# Patient Record
Sex: FEMALE | Race: WHITE | NOT HISPANIC OR LATINO | Employment: FULL TIME | URBAN - METROPOLITAN AREA
[De-identification: names, ages, dates, MRNs, and addresses within clinical notes are randomized per-mention and may not be internally consistent; named-entity substitution may affect disease eponyms.]

---

## 2017-01-16 ENCOUNTER — ALLSCRIPTS OFFICE VISIT (OUTPATIENT)
Dept: OTHER | Facility: OTHER | Age: 37
End: 2017-01-16

## 2017-02-07 ENCOUNTER — HOSPITAL ENCOUNTER (OUTPATIENT)
Dept: SLEEP CENTER | Facility: HOSPITAL | Age: 37
Discharge: HOME | End: 2017-02-07
Attending: INTERNAL MEDICINE
Payer: COMMERCIAL

## 2017-02-07 ENCOUNTER — TRANSCRIBE ORDERS (OUTPATIENT)
Dept: SLEEP CENTER | Facility: HOSPITAL | Age: 37
End: 2017-02-07

## 2017-02-07 DIAGNOSIS — G47.33 OBSTRUCTIVE SLEEP APNEA (ADULT) (PEDIATRIC): ICD-10-CM

## 2017-02-07 DIAGNOSIS — F51.01 PRIMARY INSOMNIA: Primary | ICD-10-CM

## 2017-03-11 ENCOUNTER — ALLSCRIPTS OFFICE VISIT (OUTPATIENT)
Dept: OTHER | Facility: OTHER | Age: 37
End: 2017-03-11

## 2017-04-20 ENCOUNTER — ALLSCRIPTS OFFICE VISIT (OUTPATIENT)
Dept: OTHER | Facility: OTHER | Age: 37
End: 2017-04-20

## 2017-05-09 ENCOUNTER — TRANSCRIBE ORDERS (OUTPATIENT)
Dept: SLEEP CENTER | Facility: HOSPITAL | Age: 37
End: 2017-05-09

## 2017-05-09 ENCOUNTER — HOSPITAL ENCOUNTER (OUTPATIENT)
Dept: SLEEP CENTER | Facility: HOSPITAL | Age: 37
Discharge: HOME | End: 2017-05-09
Attending: INTERNAL MEDICINE
Payer: COMMERCIAL

## 2017-05-09 DIAGNOSIS — F51.01 PRIMARY INSOMNIA: ICD-10-CM

## 2017-05-09 DIAGNOSIS — G47.33 OSA (OBSTRUCTIVE SLEEP APNEA): Primary | ICD-10-CM

## 2017-07-21 ENCOUNTER — ALLSCRIPTS OFFICE VISIT (OUTPATIENT)
Dept: OTHER | Facility: OTHER | Age: 37
End: 2017-07-21

## 2017-12-07 ENCOUNTER — GENERIC CONVERSION - ENCOUNTER (OUTPATIENT)
Dept: OTHER | Facility: OTHER | Age: 37
End: 2017-12-07

## 2017-12-27 ENCOUNTER — ALLSCRIPTS OFFICE VISIT (OUTPATIENT)
Dept: OTHER | Facility: OTHER | Age: 37
End: 2017-12-27

## 2017-12-27 PROCEDURE — G0145 SCR C/V CYTO,THINLAYER,RESCR: HCPCS | Performed by: OBSTETRICS & GYNECOLOGY

## 2017-12-27 PROCEDURE — 87624 HPV HI-RISK TYP POOLED RSLT: CPT | Performed by: OBSTETRICS & GYNECOLOGY

## 2017-12-28 ENCOUNTER — LAB REQUISITION (OUTPATIENT)
Dept: LAB | Facility: HOSPITAL | Age: 37
End: 2017-12-28
Payer: COMMERCIAL

## 2017-12-28 DIAGNOSIS — Z12.4 ENCOUNTER FOR SCREENING FOR MALIGNANT NEOPLASM OF CERVIX: ICD-10-CM

## 2017-12-28 DIAGNOSIS — Z11.51 ENCOUNTER FOR SCREENING FOR HUMAN PAPILLOMAVIRUS (HPV): ICD-10-CM

## 2017-12-28 NOTE — PROGRESS NOTES
Assessment   1  Contraceptive use (V25 40) (Z30 40)   2  Uterine enlargement (621 2) (N85 2)   3  Encounter for routine gynecological examination with Papanicolaou smear of cervix     (V72 31,V76 2) (Z01 419)   4  Visit for screening mammogram (V76 12) (Z12 31)    Plan   Contraceptive use    · Dianna 0 35 MG Oral Tablet; One po daily   Rx By: Dov Mayer; Dispense: 28 Days ; #:1 X 28 Tablet Disp Pack; Refill: 12; For: Contraceptive use; CECELIA = N; Verified Transmission to [facility][id number] Last Updated By: System, [employer]; 12/27/2017 10:08:44 AM  Encounter for routine gynecological examination with Papanicolaou smear of cervix    · Follow-up visit in 1 year Evaluation and Treatment  Follow-up  Status: Hold For -    Scheduling  Requested for: 94Dyj7190   Ordered; For: Encounter for routine gynecological examination with Papanicolaou smear of cervix; Ordered By: Dov Mayer Performed:  Due: 37LPX6007  Uterine enlargement    · Follow-up visit in 3 weeks Evaluation and Treatment  Follow-up  Status: Hold For -    Scheduling  Requested for: 85Bee6257   Ordered; For: Uterine enlargement; Ordered By: Dov Mayer Performed:  Due: 30FIH2756  Visit for screening mammogram    · * MAMMO SCREENING BILATERAL W CAD; Status:Hold For - Scheduling; Requested    PKU:[id number]; Perform:[facility]iology; GJO:[id number];KJXTDWO; For:Visit for screening mammogram; Ordered By:Shaniqua Segura; Discussion/Summary      1  Yearly exam-Pap smear done with HPV testing with patient consent, self-breast awareness reviewed, calcium/vitamin-D recommendations recommendations given, mammogram request given postdated June 2018 Contraceptionâpatient stop combined OCP secondary to blood pressure issues  Her blood pressure is much improved  She notes normal menses on the minipill, but her period is somewhat irregular, occasionally occurring a few days earlier  Birth control counselingâpatient does not plan to have children   She is interested in Mirena IUD  We will assess for this in the future after her ultrasound as noted below  Uterine enlargement-exam notable for possible myomatous uterus at 8-10 week size with some fullness appreciated toward the right  She will return in the near future to assess for possible uterine myomas, rule out ovarian cyst  will follow-up in the near future for ultrasound and office visit with me  Otherwise, follow-up 1 year for yearly exam     The patient has the current Goals: Reviewed  The patent has the current Barriers: None  Patient is able to Self-Care  PATIENT EDUCATION RECORD She was given the following educational materials: Calcium/vitamin-D sheet   Possible side effects of new medications were reviewed with the patient/guardian today  The treatment plan was reviewed with the patient/guardian  The patient/guardian understands and agrees with the treatment plan      Chief Complaint   1  Visit For: Preventive General Multisystem Exam    History of Present Illness   HPI: Patient was seen today for yearly exam  Please see assessment plan and discussion for details  Review of Systems        Constitutional: No fever, no chills, feels well, no tiredness, no recent weight gain or loss  ENT: no ear ache, no loss of hearing, no nosebleeds or nasal discharge, no sore throat or hoarseness  Cardiovascular: no complaints of slow or fast heart rate, no chest pain, no palpitations, no leg claudication or lower extremity edema  Respiratory: no complaints of shortness of breath, no wheezing, no dyspnea on exertion, no orthopnea or PND  Breasts: no complaints of breast pain, breast lump or nipple discharge  Gastrointestinal: no complaints of abdominal pain, no constipation, no nausea or diarrhea, no vomiting, no bloody stools  Genitourinary: no complaints of dysuria, no incontinence, no pelvic pain, no dysmenorrhea, no vaginal discharge or abnormal vaginal bleeding        Musculoskeletal: no complaints of arthralgia, no myalgia, no joint swelling or stiffness, no limb pain or swelling  Integumentary: no complaints of skin rash or lesion, no itching or dry skin, no skin wounds  Neurological: no complaints of headache, no confusion, no numbness or tingling, no dizziness or fainting  ROS reviewed  Active Problems   1  Allergic rhinitis (477 9) (J30 9)   2  Birth control counseling (V25 09) (Z30 09)   3  Contraceptive use (V25 40) (Z30 40)   4  Depression (311) (F32 9)   5  Fatigue (780 79) (R53 83)   6  Generalized anxiety disorder (300 02) (F41 1)   7  History of self breast exam   8  Multiple nevi (216 9) (D22 9)   9  Sleep disturbances (780 50) (G47 9)    Past Medical History    · History of Fullness in ear, left (388 8) (H93 8X2)   · History of  0 (V49 89)   · History of hypertension (V12 59) (Z86 79)     The active problems and past medical history were reviewed and updated today  Surgical History    · History of Cholecystectomy   · History of Oral Surgery Tooth Extraction     The surgical history was reviewed and updated today         Family History   Mother    · Family history of Anxiety   · Family history of blood clots (V18 3) (Z82 49)   · Family history of hypertension (V17 49) (Z82 49)  Father    · Family history of EtOH dependence  Brother    · Family history of hypertension (V17 49) (Z82 49)  Grandmother    · Family history of diabetes mellitus (V18 0) (Z83 3)  Maternal Grandmother    · Family history of Anxiety   · Family history of blood clots (V18 3) (Z82 49)  Grandfather    · Family history of cardiac disorder (V17 49) (Z82 49)   · Family history of malignant neoplasm (V16 9) (Z80 9)  Maternal Grandfather    · Family history of Lung cancer (162 9) (C34 90)  Uncle    · Family history of malignant neoplasm (V16 9) (Z80 9)  Family History    · Family history of Breast cancer (174 9) (C50 919)   · Family history of blood clots (V18 3) (Z82 49)     The family history was reviewed and updated today  Social History    · Always uses seat belt   · Caffeine use (V49 89) (F15 90)   · [employer] ([address]   ·    · Never a smoker   · No drug use   · Occasional alcohol use   · Occupation   · Teacher   · Oral contraceptives  The social history was reviewed and updated today  The social history was reviewed and is unchanged  Current Meds    1  Dianna 0 35 MG Oral Tablet; One po daily; Therapy: 12UVH7515 to (INTHRS:42CHQ8084)  Requested for: 00FPQ9133; Last     Rx:46Blp9074 Ordered   2  Escitalopram Oxalate 20 MG Oral Tablet; Take 1 tablet daily; Therapy: 71Wsr9093 to (Evaluate:08Apr2018)  Requested for: 80EKN1526; Last     Rx:10Oct2017 Ordered   3  Fluticasone Propionate 50 MCG/ACT Nasal Suspension Recorded   4  LORazepam 0 5 MG Oral Tablet; TAKE 1 TABLET DAILY AS NEEDED; Therapy: 18VRQ6364 to (Evaluate:06Jan2018); Last Rx:47Ibb4069 Ordered   5  Melatonin 3 MG Oral Tablet; TAKE 1 TABLET Bedtime Recorded   6  Singulair 10 MG Oral Tablet; Take 1 tablet daily Recorded   7  ZyrTEC Allergy 10 MG Oral Capsule Recorded    Allergies   1  No Known Drug Allergies  2  Dust   3  Mold    Vitals    Recorded: 53GPZ5798 61:40EF   Systolic 676, LUE, Sitting   Diastolic 76, LUE, Sitting   Height 5 ft 8 in   Weight 298 lb    BMI Calculated 45 31   BSA Calculated 2 42   LMP [id number]     Physical Exam        Constitutional      General appearance: No acute distress, well appearing and well nourished  Neck      Neck: Normal, supple, trachea midline, no masses  Thyroid: Normal, no thyromegaly  Genitourinary      External genitalia: Normal and no lesions appreciated  Vagina: Normal, no lesions or dryness appreciated  Urethra: Normal        Urethral meatus: Normal        Bladder: Normal, soft, non-tender and no prolapse or masses appreciated  Cervix: Normal, no palpable masses   -- Without lesions or discharge or cervicitis  No Cervical motion tenderness  Light bleeding with Pap test       Uterus: Abnormal  -- Enlarged, midline, 8-10 week size, nontender with possible myoma toward the right  Adnexa/parametria: Abnormal  -- Fullness toward the right, nontender  Left was negative  Anus, perineum, and rectum: Abnormal   Confirmatory, with uterine enlargement and fullness toward the right adnexal area  No tenderness appreciated      Chest      Breasts: Normal and no dimpling or skin changes noted  Abdomen      Abdomen: Normal, non-tender, and no organomegaly noted  Liver and spleen: No hepatomegaly or splenomegaly  Examination for hernias: No hernias appreciated  Lymphatic      Palpation of lymph nodes in neck, axillae, groin and/or other locations: No lymphadenopathy or masses noted  Skin      Skin and subcutaneous tissue: Normal skin turgor and no rashes         Palpation of skin and subcutaneous tissue: Normal        Psychiatric      Orientation to person, place, and time: Normal        Mood and affect: Normal        Signatures    Electronically signed by : DAHLIA Flores ; Dec 27 2017 10:13AM EST                       (Author)

## 2018-01-02 LAB — HPV RRNA GENITAL QL NAA+PROBE: NORMAL

## 2018-01-03 LAB
LAB AP GYN PRIMARY INTERPRETATION: NORMAL
LAB AP LMP: NORMAL
Lab: NORMAL

## 2018-01-04 ENCOUNTER — GENERIC CONVERSION - ENCOUNTER (OUTPATIENT)
Dept: OTHER | Facility: OTHER | Age: 38
End: 2018-01-04

## 2018-01-10 NOTE — PROGRESS NOTES
Assessment    1  Generalized anxiety disorder (300 02) (F41 1)   2  Depression (311) (F32 9)   3  Sleep disturbances (780 50) (G47 9)    Plan  Depression, Generalized anxiety disorder    · Escitalopram Oxalate 20 MG Oral Tablet; Take 1 tablet daily   · LORazepam 0 5 MG Oral Tablet; TAKE 1 TABLET DAILY AS NEEDED   · Follow-up visit in 1 month Evaluation and Treatment  Follow-up  Status: Complete  Done:  38XYY3871    Discussion/Summary    The patient is having increasing anxiety and depression symptoms which is impacting her sleep  We will increase the escitalopram as ordered  She can continue to use the lorazepam sparingly as needed for panic attacks  I did advise her to try melatonin 3 mg to be taken 3 hours prior to bedtime to see if that helps with her sleep  We will monitor her closely and we will recheck her back in the office again in a month  Chief Complaint  Check up 3 months, Anxiety  Patient is here today for follow up of chronic conditions described in HPI  History of Present Illness  Chapin Del Valle is a 53-year-old female who presents today for follow-up of her anxiety  She is currently taking escitalopram 10 mg daily  She is under more stress with her 's medical problems  Her recent sleep study did not demonstrate sleep apnea  She is trying to go back to [employer]  She is not sleeping well at night  There is difficulty falling and staying asleep  She has had increased anxiety and that is making the sleep issue worse  When she is at home, she is thinking more about thing and feels more anxious  There are more panic attacks  There is relief of the panic attacks with the anxiety  She took the Lorazepam with some relief at bedtime- she slept a little better  There is some increased depression symptoms  The patient denies any chest pain, shortness of breath, or palpitations  There is no edema  There are no headaches or visual changes   There is no lightheadedness, dizziness, or fainting spells  She has been more forgetful with being tired  She is having increased depression  The patient states her depression has worsened since the last visit  They have had recurrent episodes of major depression  She describes this as moderate in severity  Interval Symptoms: worsened depression, worsened depressed mood, worsened loss of interest or pleasure in activities, worsened insomnia, worsened excessive sleepiness, worsened inability to perform normal activities, worsened loss of energy, denies feelings of worthlessness, denies feelings of guilt, worsened trouble concentrating, worsened anxiety and denies sexual dysfunction  Associated symptoms include: no thoughts of suicide  The patient is being seen for follow-up of anxiety  The patient reports doing poorly  Comorbid Illnesses: depression  Interval symptoms:  worsened anxiety, worsened difficulty concentrating, worsened restlessness, worsened panic attacks, worsened sleep disruption and worsened depression  Associated symptoms: no grandiosity, no racing thoughts, no periods of euphoria, no hallucinations and no suicidal ideation  Medications:  the patient is adherent to her medication regimen, but she denies medication side effects  Review of Systems    Constitutional: No fever, no chills, feels well, no tiredness, no recent weight gain or weight loss  Eyes: No complaints of eye pain, no red eyes, no eyesight problems, no discharge, no dry eyes, no itching of eyes  ENT: as noted in HPI, no earache, no nosebleeds, no sore throat, no hearing loss, no nasal discharge and no hoarseness  Cardiovascular: No complaints of slow heart rate, no fast heart rate, no chest pain, no palpitations, no leg claudication, no lower extremity edema  Respiratory: No complaints of shortness of breath, no wheezing, no cough, no SOB on exertion, no orthopnea, no PND     Gastrointestinal: No complaints of abdominal pain, no constipation, no nausea or vomiting, no diarrhea, no bloody stools  Genitourinary: No complaints of dysuria, no incontinence, no pelvic pain, no dysmenorrhea, no vaginal discharge or bleeding  Musculoskeletal: No complaints of arthralgias, no myalgias, no joint swelling or stiffness, no limb pain or swelling  Integumentary: No complaints of skin rash or lesions, no itching, no skin wounds, no breast pain or lump  Neurological: No complaints of headache, no confusion, no convulsions, no numbness, no dizziness or fainting, no tingling, no limb weakness, no difficulty walking  Psychiatric: Not suicidal, no sleep disturbance, no anxiety or depression, no change in personality, no emotional problems  Endocrine: No complaints of proptosis, no hot flashes, no muscle weakness, no deepening of the voice, no feelings of weakness  Hematologic/Lymphatic: No complaints of swollen glands, no swollen glands in the neck, does not bleed easily, does not bruise easily  Active Problems    1  Allergic rhinitis (477 9) (J30 9)   2  Birth control counseling (V25 09) (Z30 09)   3  Contraceptive use (V25 40) (Z30 40)   4  Depression (311) (F32 9)   5  Fatigue (780 79) (R53 83)   6  Generalized anxiety disorder (300 02) (F41 1)   7  Multiple nevi (216 9) (D22 9)   8  Sleep disturbances (780 50) (G47 9)    Past Medical History    1  History of Fullness in ear, left (388 8) (H93 8X2)   2  History of  0 (V49 89) (Z78 9)   3  History of hypertension (V12 59) (Z86 79)    The active problems and past medical history were reviewed and updated today  Surgical History    1  History of Cholecystectomy   2  History of Oral Surgery Tooth Extraction    The surgical history was reviewed and updated today  Family History  Mother    1  Family history of Anxiety   2  Family history of blood clots (V18 3) (Z82 49)   3  Family history of hypertension (V17 49) (Z82 49)  Father    4  Family history of EtOH dependence  Brother    5   Family history of hypertension (V17 49) (Z82 49)  Grandmother    6  Family history of diabetes mellitus (V18 0) (Z83 3)  Maternal Grandmother    7  Family history of Anxiety   8  Family history of blood clots (V18 3) (Z82 49)  Grandfather    9  Family history of cardiac disorder (V17 49) (Z82 49)   10  Family history of malignant neoplasm (V16 9) (Z80 9)  Maternal Grandfather    6  Family history of Lung cancer (162 9) (C34 90)  Uncle    12  Family history of malignant neoplasm (V16 9) (Z80 9)  Family History    13  Family history of Breast cancer (174 9) (C50 919)   14  Family history of blood clots (V18 3) (Z82 49)    The family history was reviewed and updated today  Social History    · Always uses seat belt   · Caffeine use (V49 89) (F15 90)   · [employer] ([address])   ·    · Never a smoker   · No drug use   · Occasional alcohol use   · Occupation   · Oral contraceptives  The social history was reviewed and updated today  The social history was reviewed and is unchanged  Current Meds   1  Dianna 0 35 MG Oral Tablet; One po daily; Therapy: 63QMP0354 to (Nicholas Leaver)  Requested for: 90BVT0135; Last   Rx:16Jan2017; Status: ACTIVE - Renewal Denied Ordered   2  Escitalopram Oxalate 10 MG Oral Tablet; Take 1 tablet daily; Therapy: 19Rge5903 to (Evaluate:05Qse4556)  Requested for: 58RYU1140; Last   Rx:50Qlv0210 Ordered   3  Fluticasone Propionate 50 MCG/ACT Nasal Suspension Recorded   4  LORazepam 0 5 MG Oral Tablet; TAKE 1 TABLET DAILY AS NEEDED; Therapy: 36UVH0483 to (Evaluate:44Uyh4251); Last Rx:93Oef8253 Ordered   5  Singulair 10 MG Oral Tablet; Take 1 tablet daily Recorded   6  ZyrTEC Allergy 10 MG Oral Capsule Recorded    Allergies    1   No Known Drug Allergies    Vitals  Vital Signs    Recorded: 15NAC6381 10:00AM Recorded: 69DLD8138 09:27AM   Heart Rate 68 80   Systolic 776 388, RUE, Sitting   Diastolic 90 90, RUE, Sitting   BP CUFF SIZE  Large   Height  5 ft 8 in Weight  294 lb 8 0 oz   BMI Calculated  44 78   BSA Calculated  2 41     Physical Exam    Constitutional   General appearance: No acute distress, well appearing and well nourished  Eyes   Conjunctiva and lids: No swelling, erythema or discharge  Pupils and irises: Equal, round and reactive to light  Ears, Nose, Mouth, and Throat   External inspection of ears and nose: Normal     Otoscopic examination: Tympanic membranes translucent with normal light reflex  Canals patent without erythema  Nasal mucosa, septum, and turbinates: Normal without edema or erythema  Oropharynx: Normal with no erythema, edema, exudate or lesions  Pulmonary   Respiratory effort: No increased work of breathing or signs of respiratory distress  Auscultation of lungs: Clear to auscultation  Cardiovascular   Palpation of heart: Normal PMI, no thrills  Auscultation of heart: Normal rate and rhythm, normal S1 and S2, without murmurs  Examination of extremities for edema and/or varicosities: Normal     Carotid pulses: Normal     Abdomen   Abdomen: Non-tender, no masses  Liver and spleen: No hepatomegaly or splenomegaly  Lymphatic   Palpation of lymph nodes in neck: No lymphadenopathy  Musculoskeletal   Gait and station: Normal     Digits and nails: Normal without clubbing or cyanosis  Inspection/palpation of joints, bones, and muscles: Normal     Skin   Skin and subcutaneous tissue: Normal without rashes or lesions  Neurologic   Cranial nerves: Cranial nerves 2-12 intact  Reflexes: 2+ and symmetric  Sensation: No sensory loss      Psychiatric   Orientation to person, place, and time: Normal     Mood and affect: Normal          Future Appointments    Date/Time Provider Specialty Site   04/20/2017 05:15 PM Horacio Street DO Family [facility]     Signatures   Electronically signed by : Ximena Landers DO; Mar 11 2017  1:20PM EST                       (Author)

## 2018-01-12 VITALS
DIASTOLIC BLOOD PRESSURE: 64 MMHG | SYSTOLIC BLOOD PRESSURE: 118 MMHG | HEIGHT: 68 IN | WEIGHT: 293 LBS | BODY MASS INDEX: 44.41 KG/M2

## 2018-01-13 VITALS
SYSTOLIC BLOOD PRESSURE: 130 MMHG | DIASTOLIC BLOOD PRESSURE: 84 MMHG | BODY MASS INDEX: 44.41 KG/M2 | HEIGHT: 68 IN | WEIGHT: 293 LBS | HEART RATE: 88 BPM

## 2018-01-14 VITALS
HEIGHT: 68 IN | BODY MASS INDEX: 44.41 KG/M2 | DIASTOLIC BLOOD PRESSURE: 90 MMHG | WEIGHT: 293 LBS | SYSTOLIC BLOOD PRESSURE: 130 MMHG | HEART RATE: 68 BPM

## 2018-01-15 VITALS
HEART RATE: 68 BPM | DIASTOLIC BLOOD PRESSURE: 82 MMHG | HEIGHT: 68 IN | BODY MASS INDEX: 43.65 KG/M2 | WEIGHT: 288 LBS | SYSTOLIC BLOOD PRESSURE: 120 MMHG

## 2018-01-17 ENCOUNTER — GENERIC CONVERSION - ENCOUNTER (OUTPATIENT)
Dept: OTHER | Facility: OTHER | Age: 38
End: 2018-01-17

## 2018-01-22 VITALS
DIASTOLIC BLOOD PRESSURE: 76 MMHG | BODY MASS INDEX: 44.41 KG/M2 | WEIGHT: 293 LBS | HEIGHT: 68 IN | SYSTOLIC BLOOD PRESSURE: 122 MMHG

## 2018-01-23 NOTE — MISCELLANEOUS
Message   Recorded as Task   Date: 01/04/2018 08:33 AM, Created By: Ingrid Lopez   Task Name: Miscellaneous   Assigned To: Ryan Mills   Regarding Patient: Sanket Senior, Status: Active   CommentHerschel Gm - 04 Jan 2018 8:33 AM     TASK CREATED  Pap and HPV normal   Sabine Conti - 04 Jan 2018 9:24 AM     TASK EDITED  normal card mailed        Active Problems    1  Allergic rhinitis (477 9) (J30 9)   2  Birth control counseling (V25 09) (Z30 09)   3  Cervical cancer screening (V76 2) (Z12 4)   4  Contraceptive use (V25 40) (Z30 40)   5  Depression (311) (F32 9)   6  Encounter for routine gynecological examination with Papanicolaou smear of cervix   (V72 31,V76 2) (Z01 419)   7  Fatigue (780 79) (R53 83)   8  Generalized anxiety disorder (300 02) (F41 1)   9  History of self breast exam   10  Multiple nevi (216 9) (D22 9)   11  Screening for HPV (human papillomavirus) (V73 81) (Z11 51)   12  Sleep disturbances (780 50) (G47 9)   13  Uterine enlargement (621 2) (N85 2)   14  Visit for screening mammogram (V76 12) (Z12 31)    Current Meds   1  Dianna 0 35 MG Oral Tablet; One po daily; Therapy: 89ZUT8683 to (Evaluate:70Fop0475)  Requested for: ; Last   Rx:06Rdf3683 Ordered   2  Escitalopram Oxalate 20 MG Oral Tablet; Take 1 tablet daily; Therapy: 67Sus3402 to (Evaluate:08Apr2018)  Requested for: 66ZJU2572; Last   Rx:74Rag2506 Ordered   3  Fluticasone Propionate 50 MCG/ACT Nasal Suspension Recorded   4  LORazepam 0 5 MG Oral Tablet; TAKE 1 TABLET DAILY AS NEEDED; Therapy: 86IQF2947 to (Evaluate:06Jan2018); Last Rx:43Wew1712 Ordered   5  Melatonin 3 MG Oral Tablet; TAKE 1 TABLET Bedtime Recorded   6  Singulair 10 MG Oral Tablet (Montelukast Sodium); Take 1 tablet daily Recorded   7  ZyrTEC Allergy 10 MG Oral Capsule Recorded    Allergies    1  No Known Drug Allergies    2  Dust   3   Mold    Signatures   Electronically signed by : Khari Matson, ; Jan 4 2018  9:24AM EST (Author)

## 2018-01-24 VITALS
WEIGHT: 293 LBS | BODY MASS INDEX: 44.41 KG/M2 | DIASTOLIC BLOOD PRESSURE: 90 MMHG | SYSTOLIC BLOOD PRESSURE: 130 MMHG | HEIGHT: 68 IN

## 2018-01-24 VITALS — DIASTOLIC BLOOD PRESSURE: 84 MMHG | HEART RATE: 68 BPM | TEMPERATURE: 98.4 F | SYSTOLIC BLOOD PRESSURE: 130 MMHG

## 2018-01-24 VITALS
HEIGHT: 68 IN | DIASTOLIC BLOOD PRESSURE: 80 MMHG | BODY MASS INDEX: 44.41 KG/M2 | WEIGHT: 293 LBS | HEART RATE: 84 BPM | SYSTOLIC BLOOD PRESSURE: 140 MMHG

## 2018-05-11 DIAGNOSIS — F32.A DEPRESSION, UNSPECIFIED DEPRESSION TYPE: Primary | ICD-10-CM

## 2018-05-11 RX ORDER — ESCITALOPRAM OXALATE 20 MG/1
1 TABLET ORAL DAILY
COMMUNITY
Start: 2016-08-04 | End: 2018-05-11 | Stop reason: SDUPTHER

## 2018-05-11 RX ORDER — ESCITALOPRAM OXALATE 20 MG/1
20 TABLET ORAL DAILY
Qty: 90 TABLET | Refills: 1 | Status: SHIPPED | OUTPATIENT
Start: 2018-05-11 | End: 2018-12-04 | Stop reason: SDUPTHER

## 2018-06-29 DIAGNOSIS — Z12.31 ENCOUNTER FOR SCREENING MAMMOGRAM FOR MALIGNANT NEOPLASM OF BREAST: ICD-10-CM

## 2018-07-16 ENCOUNTER — PROCEDURE VISIT (OUTPATIENT)
Dept: OBGYN CLINIC | Facility: CLINIC | Age: 38
End: 2018-07-16
Payer: COMMERCIAL

## 2018-07-16 VITALS
HEIGHT: 68 IN | WEIGHT: 293 LBS | BODY MASS INDEX: 44.41 KG/M2 | DIASTOLIC BLOOD PRESSURE: 85 MMHG | SYSTOLIC BLOOD PRESSURE: 122 MMHG

## 2018-07-16 DIAGNOSIS — Z30.430 ENCOUNTER FOR INSERTION OF MIRENA IUD: Primary | ICD-10-CM

## 2018-07-16 PROBLEM — N92.6 IRREGULAR MENSES: Status: ACTIVE | Noted: 2018-01-17

## 2018-07-16 PROBLEM — N85.2 UTERINE ENLARGEMENT: Status: ACTIVE | Noted: 2017-12-27

## 2018-07-16 PROBLEM — R10.2 FEMALE PELVIC PAIN: Status: ACTIVE | Noted: 2018-01-17

## 2018-07-16 LAB — SL AMB POCT URINE HCG: NEGATIVE

## 2018-07-16 PROCEDURE — 58300 INSERT INTRAUTERINE DEVICE: CPT | Performed by: OBSTETRICS & GYNECOLOGY

## 2018-07-16 PROCEDURE — 81025 URINE PREGNANCY TEST: CPT | Performed by: OBSTETRICS & GYNECOLOGY

## 2018-07-16 RX ORDER — ACETAMINOPHEN AND CODEINE PHOSPHATE 120; 12 MG/5ML; MG/5ML
SOLUTION ORAL DAILY
COMMUNITY
Start: 2016-10-10 | End: 2018-07-18 | Stop reason: ALTCHOICE

## 2018-07-16 RX ORDER — LORAZEPAM 0.5 MG/1
1 TABLET ORAL DAILY PRN
COMMUNITY
Start: 2016-08-04 | End: 2018-07-18 | Stop reason: SDUPTHER

## 2018-07-16 RX ORDER — MONTELUKAST SODIUM 10 MG/1
1 TABLET ORAL DAILY
COMMUNITY
End: 2019-03-11

## 2018-07-16 NOTE — PROGRESS NOTES
Iud insertions  Date/Time: 7/16/2018 3:10 PM  Performed by: Lily Cooper by: Jerad Veliz     Consent:     Consent obtained:  Written    Consent given by:  Patient    Procedure risks and benefits discussed: yes      Patient questions answered: yes      Patient agrees, verbalizes understanding, and wants to proceed: yes      Educational handouts given: no      Instructions and paperwork completed: yes    Procedure:     Pelvic exam performed: yes      Negative GC/chlamydia test: no      Negative urine pregnancy test: yes      Cervix cleaned and prepped: yes      Speculum placed in vagina: yes      Tenaculum applied to cervix: yes      Uterus sounded: yes      IUD inserted with no complications: yes      IUD type:  Mirena    Strings trimmed: yes      Uterus sound depth (cm):  7  Post-procedure:     Patient tolerated procedure well: yes      Patient will follow up after next period: yes

## 2018-07-18 ENCOUNTER — OFFICE VISIT (OUTPATIENT)
Dept: FAMILY MEDICINE CLINIC | Facility: CLINIC | Age: 38
End: 2018-07-18
Payer: COMMERCIAL

## 2018-07-18 VITALS
SYSTOLIC BLOOD PRESSURE: 124 MMHG | WEIGHT: 293 LBS | BODY MASS INDEX: 47.14 KG/M2 | TEMPERATURE: 98.7 F | HEART RATE: 64 BPM | DIASTOLIC BLOOD PRESSURE: 80 MMHG

## 2018-07-18 DIAGNOSIS — F41.1 GENERALIZED ANXIETY DISORDER: Primary | ICD-10-CM

## 2018-07-18 DIAGNOSIS — F32.A DEPRESSION, UNSPECIFIED DEPRESSION TYPE: ICD-10-CM

## 2018-07-18 PROCEDURE — 99213 OFFICE O/P EST LOW 20 MIN: CPT | Performed by: FAMILY MEDICINE

## 2018-07-18 RX ORDER — LORAZEPAM 0.5 MG/1
0.5 TABLET ORAL DAILY PRN
Qty: 30 TABLET | Refills: 0 | Status: SHIPPED | OUTPATIENT
Start: 2018-07-18 | End: 2019-03-11 | Stop reason: SDUPTHER

## 2018-07-18 NOTE — PROGRESS NOTES
Assessment/Plan:  Anxiety and depression-the patient is stable on her current dose of the escitalopram   We refilled her lorazepam to use as needed sparingly for breakthrough panic attacks  She will go for the lab work as ordered and will follow up closely with the results  She is going to continue work on her diet and exercise and on trying to lose some weight  We will see her back in the office as scheduled  Diagnoses and all orders for this visit:    Generalized anxiety disorder  -     LORazepam (ATIVAN) 0 5 mg tablet; Take 1 tablet (0 5 mg total) by mouth daily as needed for anxiety    Depression, unspecified depression type    Other orders  -     levonorgestrel (MIRENA, 52 MG,) 20 MCG/24HR IUD; 1 each by Intrauterine route once       No Follow-up on file  Subjective:   Chief Complaint   Patient presents with    Follow-up     Check up Depression        Patient ID: Marcos Solo is a 34 y o  female presents today for a follow up  Marcos Solo is a 34 y o  female who presents today for follow-up of her anxiety and depression  She continues to take the escitalopram 20 mg daily  She has been under a lot of stress recently  She is doing well on the medication  She occasionally uses the lorazepam   There are no panic attacks and there are no suicidal thoughts  The patient denies any chest pain, shortness of breath, or palpitations  There is no edema  There are no headaches or visual changes  There is no lightheadedness, dizziness, or fainting spells  She just went back on Weight Watchers and is working on her diet  She lost 3 lbs over the last weeks  She saw her GYN and was given an order for a mammogram    She is going to follow up with Dr Garry Delgado for the sleep issues  Anxiety   Presents for follow-up visit   Patient reports no chest pain, compulsions, confusion, decreased concentration, depressed mood, dizziness, dry mouth, excessive worry, feeling of choking, hyperventilation, impotence, insomnia, irritability, malaise, muscle tension, nausea, nervous/anxious behavior, obsessions, palpitations, panic, restlessness, shortness of breath or suicidal ideas  Symptoms occur constantly  Depression   This is a chronic problem  The current episode started more than 1 year ago  The problem occurs constantly  The problem has been unchanged  Pertinent negatives include no abdominal pain, anorexia, arthralgias, change in bowel habit, chest pain, chills, congestion, coughing, diaphoresis, fatigue, fever, headaches, joint swelling, myalgias, nausea, neck pain, numbness, rash, sore throat, swollen glands, urinary symptoms, vertigo, visual change, vomiting or weakness       The following portions of the patient's history were reviewed and updated as appropriate: allergies, current medications, past family history, past medical history, past social history, past surgical history and problem list   Patient Active Problem List   Diagnosis    Uterine enlargement    Irregular menses    Female pelvic pain    Depression    Generalized anxiety disorder     Past Medical History:   Diagnosis Date    Fullness in ear, left     Resolved [id number]    Hypertension     Resolved [id number]     Past Surgical History:   Procedure Laterality Date    CHOLECYSTECTOMY      TOOTH EXTRACTION       No Known Allergies  Family History   Problem Relation Age of Onset    Anxiety disorder Mother     Other Mother         Blood clots    Hypertension Mother     Alcohol abuse Father     Hypertension Brother     Anxiety disorder Maternal Grandmother     Other Maternal Grandmother         Blood clots    Lung cancer Maternal Grandfather     Breast cancer Family     Heart disease Family         cardiac disorder    Cancer Family     Diabetes Family     Cancer Family      Social History     Social History    Marital status: /Civil Union     Spouse name: N/A    Number of children: N/A    Years of education: N/A Occupational History    Not on file  Social History Main Topics    Smoking status: Never Smoker    Smokeless tobacco: Never Used    Alcohol use Yes      Comment: occasional    Drug use: No    Sexual activity: Yes     Partners: Male     Birth control/ protection: OCP     Other Topics Concern    Not on file     Social History Narrative    Always uses seat belt    Caffeine use    [employer] ([employer])     Current Outpatient Prescriptions on File Prior to Visit   Medication Sig Dispense Refill    Cetirizine HCl (ZYRTEC ALLERGY) 10 MG CAPS Take by mouth      escitalopram (LEXAPRO) 20 mg tablet Take 1 tablet (20 mg total) by mouth daily 90 tablet 1    montelukast (SINGULAIR) 10 mg tablet Take 1 tablet by mouth daily      [DISCONTINUED] LORazepam (ATIVAN) 0 5 mg tablet Take 1 tablet by mouth daily as needed      [DISCONTINUED] norethindrone (CELINA) 0 35 MG tablet Take by mouth daily       No current facility-administered medications on file prior to visit  Review of Systems   Constitutional: Negative  Negative for chills, diaphoresis, fatigue, fever and irritability  HENT: Negative  Negative for congestion and sore throat  Eyes: Negative  Respiratory: Negative  Negative for cough and shortness of breath  Cardiovascular: Negative  Negative for chest pain and palpitations  Gastrointestinal: Negative  Negative for abdominal pain, anorexia, change in bowel habit, nausea and vomiting  Endocrine: Negative  Genitourinary: Negative  Negative for impotence  Musculoskeletal: Negative  Negative for arthralgias, joint swelling, myalgias and neck pain  Skin: Negative  Negative for rash  Allergic/Immunologic: Negative  Neurological: Negative  Negative for dizziness, vertigo, weakness, numbness and headaches  Hematological: Negative  Psychiatric/Behavioral: Positive for depression  Negative for confusion, decreased concentration and suicidal ideas   The patient is not nervous/anxious and does not have insomnia  Objective:  Vitals:    07/18/18 0955 07/18/18 1034   BP: 130/94 124/80   BP Location: Left arm    Patient Position: Sitting    Cuff Size: Large    Pulse: 90 64   Temp: 98 7 °F (37 1 °C)    TempSrc: Tympanic    Weight: (!) 141 kg (310 lb)      Body mass index is 47 14 kg/m²  Wt Readings from Last 3 Encounters:   07/18/18 (!) 141 kg (310 lb)   07/16/18 (!) 141 kg (310 lb)   01/17/18 135 kg (298 lb)     Temp Readings from Last 3 Encounters:   07/18/18 98 7 °F (37 1 °C) (Tympanic)   12/07/17 98 4 °F (36 9 °C)   08/25/16 97 7 °F (36 5 °C) (Tympanic)     BP Readings from Last 3 Encounters:   07/18/18 124/80   07/16/18 122/85   01/17/18 130/90     Pulse Readings from Last 3 Encounters:   07/18/18 64   12/07/17 68   12/07/17 84        Physical Exam   Constitutional: She is oriented to person, place, and time  She appears well-developed and well-nourished  HENT:   Head: Normocephalic and atraumatic  Mouth/Throat: No oropharyngeal exudate  Eyes: Conjunctivae and EOM are normal  Pupils are equal, round, and reactive to light  Neck: Normal range of motion  No JVD present  No tracheal deviation present  No thyromegaly present  Cardiovascular: Normal rate, regular rhythm, normal heart sounds and intact distal pulses  Exam reveals no gallop and no friction rub  No murmur heard  Pulmonary/Chest: Effort normal and breath sounds normal  No stridor  No respiratory distress  She has no wheezes  She has no rales  She exhibits no tenderness  Abdominal: Soft  Bowel sounds are normal  She exhibits no distension and no mass  There is no tenderness  There is no rebound and no guarding  Musculoskeletal: Normal range of motion  She exhibits no edema, tenderness or deformity  Lymphadenopathy:     She has no cervical adenopathy  Neurological: She is alert and oriented to person, place, and time  She has normal reflexes  No cranial nerve deficit   She exhibits normal muscle tone  Coordination normal    Skin: Skin is warm and dry         Procedure visit on 07/16/2018   Component Date Value     URINE HCG 07/16/2018 negative

## 2018-08-14 ENCOUNTER — OFFICE VISIT (OUTPATIENT)
Dept: OBGYN CLINIC | Facility: CLINIC | Age: 38
End: 2018-08-14
Payer: COMMERCIAL

## 2018-08-14 VITALS — BODY MASS INDEX: 47.44 KG/M2 | WEIGHT: 293 LBS | SYSTOLIC BLOOD PRESSURE: 130 MMHG | DIASTOLIC BLOOD PRESSURE: 80 MMHG

## 2018-08-14 DIAGNOSIS — Z30.431 IUD CHECK UP: Primary | ICD-10-CM

## 2018-08-14 DIAGNOSIS — D25.2 FIBROIDS, SUBSEROUS: ICD-10-CM

## 2018-08-14 PROCEDURE — 99213 OFFICE O/P EST LOW 20 MIN: CPT | Performed by: OBSTETRICS & GYNECOLOGY

## 2018-08-14 RX ORDER — AZELASTINE HYDROCHLORIDE 137 UG/1
SPRAY, METERED NASAL AS NEEDED
COMMUNITY
Start: 2018-07-23 | End: 2019-06-13 | Stop reason: SDUPTHER

## 2018-08-14 RX ORDER — SYRINGE WITH NEEDLE, 1 ML 28GX1/2"
SYRINGE, EMPTY DISPOSABLE MISCELLANEOUS
COMMUNITY
Start: 2018-07-27 | End: 2021-06-22

## 2018-08-14 RX ORDER — EPINEPHRINE 0.3 MG/.3ML
INJECTION SUBCUTANEOUS AS NEEDED
COMMUNITY
Start: 2018-07-30 | End: 2020-03-17 | Stop reason: SDUPTHER

## 2018-08-14 NOTE — PROGRESS NOTES
Assessment/Plan:      Diagnoses and all orders for this visit:    IUD check up    Fibroids, subserous    Other orders  -     Azelastine HCl 137 MCG/SPRAY SOLN; as needed  -     EPINEPHrine (EPIPEN) 0 3 mg/0 3 mL SOAJ; as needed  -     B-D ALLERGY SYRINGE 1CC/28G 28G X 1/2" 1 ML MISC;           Subjective:     Patient ID: Sabiha Bailey is a 34 y o  female  Carrolyn Nephew is here for IUD string evaluation  She had a cycle and had no problems from that  She does note more cramping with relations afterwards  She is known to have a fibroid uterus  She denies any postcoital bleeding or entrance dyspareunia  Review of Systems   Constitutional: Negative  Negative for chills and fever  Respiratory: Negative  Cardiovascular: Negative  Gastrointestinal: Negative  Negative for abdominal pain, blood in stool, constipation, diarrhea and nausea  Genitourinary: Positive for pelvic pain  Negative for difficulty urinating, dysuria, flank pain, hematuria and urgency  Skin: Negative  Negative for rash and wound  Neurological: Negative  Objective:     Physical Exam  vulvar structures are within normal limits with no lesions  The vagina is free of a erythema or discharge  The cervix is grossly normal and the IUD string is seen exiting the os  A bimanual exam reveals a greatly enlarged uterus commensurate with fibroids  Because of the patient's obesity a well refined pelvic exam is suboptimal and best referred to ultrasound

## 2018-11-01 DIAGNOSIS — F32.A DEPRESSION, UNSPECIFIED DEPRESSION TYPE: ICD-10-CM

## 2018-11-01 RX ORDER — ESCITALOPRAM OXALATE 20 MG/1
TABLET ORAL
Qty: 90 TABLET | Refills: 1 | OUTPATIENT
Start: 2018-11-01

## 2018-12-04 DIAGNOSIS — F32.A DEPRESSION, UNSPECIFIED DEPRESSION TYPE: ICD-10-CM

## 2018-12-04 RX ORDER — ESCITALOPRAM OXALATE 20 MG/1
20 TABLET ORAL DAILY
Qty: 90 TABLET | Refills: 1 | Status: SHIPPED | OUTPATIENT
Start: 2018-12-04 | End: 2019-06-02 | Stop reason: SDUPTHER

## 2019-03-11 ENCOUNTER — ANNUAL EXAM (OUTPATIENT)
Dept: OBGYN CLINIC | Facility: CLINIC | Age: 39
End: 2019-03-11
Payer: COMMERCIAL

## 2019-03-11 ENCOUNTER — OFFICE VISIT (OUTPATIENT)
Dept: FAMILY MEDICINE CLINIC | Facility: CLINIC | Age: 39
End: 2019-03-11
Payer: COMMERCIAL

## 2019-03-11 VITALS
WEIGHT: 293 LBS | BODY MASS INDEX: 44.41 KG/M2 | HEART RATE: 96 BPM | SYSTOLIC BLOOD PRESSURE: 138 MMHG | DIASTOLIC BLOOD PRESSURE: 90 MMHG | HEIGHT: 68 IN | TEMPERATURE: 98.8 F

## 2019-03-11 VITALS
HEIGHT: 68 IN | BODY MASS INDEX: 44.41 KG/M2 | WEIGHT: 293 LBS | SYSTOLIC BLOOD PRESSURE: 140 MMHG | DIASTOLIC BLOOD PRESSURE: 90 MMHG

## 2019-03-11 DIAGNOSIS — E66.01 MORBID OBESITY (HCC): ICD-10-CM

## 2019-03-11 DIAGNOSIS — Z01.419 WOMEN'S ANNUAL ROUTINE GYNECOLOGICAL EXAMINATION: ICD-10-CM

## 2019-03-11 DIAGNOSIS — Z12.31 ENCOUNTER FOR SCREENING MAMMOGRAM FOR MALIGNANT NEOPLASM OF BREAST: Primary | ICD-10-CM

## 2019-03-11 DIAGNOSIS — Z97.5 IUD CONTRACEPTION: ICD-10-CM

## 2019-03-11 DIAGNOSIS — N85.2 UTERINE ENLARGEMENT: ICD-10-CM

## 2019-03-11 DIAGNOSIS — Z13.1 SCREENING FOR DIABETES MELLITUS: ICD-10-CM

## 2019-03-11 DIAGNOSIS — D25.9 UTERINE LEIOMYOMA, UNSPECIFIED LOCATION: ICD-10-CM

## 2019-03-11 DIAGNOSIS — Z13.6 SCREENING FOR CARDIOVASCULAR CONDITION: ICD-10-CM

## 2019-03-11 DIAGNOSIS — F41.1 GENERALIZED ANXIETY DISORDER: ICD-10-CM

## 2019-03-11 DIAGNOSIS — R10.2 FEMALE PELVIC PAIN: ICD-10-CM

## 2019-03-11 DIAGNOSIS — N92.6 IRREGULAR MENSES: ICD-10-CM

## 2019-03-11 DIAGNOSIS — F32.A DEPRESSION, UNSPECIFIED DEPRESSION TYPE: Primary | ICD-10-CM

## 2019-03-11 PROCEDURE — S0612 ANNUAL GYNECOLOGICAL EXAMINA: HCPCS | Performed by: OBSTETRICS & GYNECOLOGY

## 2019-03-11 PROCEDURE — 99214 OFFICE O/P EST MOD 30 MIN: CPT | Performed by: FAMILY MEDICINE

## 2019-03-11 PROCEDURE — 3008F BODY MASS INDEX DOCD: CPT | Performed by: FAMILY MEDICINE

## 2019-03-11 PROCEDURE — 1036F TOBACCO NON-USER: CPT | Performed by: FAMILY MEDICINE

## 2019-03-11 RX ORDER — LORAZEPAM 0.5 MG/1
0.5 TABLET ORAL DAILY PRN
Qty: 30 TABLET | Refills: 0 | Status: SHIPPED | OUTPATIENT
Start: 2019-03-11 | End: 2019-06-02 | Stop reason: SDUPTHER

## 2019-03-11 NOTE — PROGRESS NOTES
Assessment/Plan:  Depression and anxiety-the patient is stable on her current medication  She is having some increasing anxiety recently related to the anniversary of the death of her friend and is interested in seeing our behavioral specialist here in the office  We will up her to schedule this to discuss counseling and other services available to her  She will continue to work on improving her diet and exercise and losing weight  She will go for the lab work as ordered  We will follow up with the results available  We will see her back in the office again 3 months  Diagnoses and all orders for this visit:    Depression, unspecified depression type  -     Comprehensive metabolic panel; Future  -     LDL cholesterol, direct; Future  -     Lipid panel; Future  -     CBC and differential; Future  -     TSH, 3rd generation with Free T4 reflex; Future  -     Urinalysis with reflex to microscopic; Future  -     Hemoglobin A1C; Future  -     Comprehensive metabolic panel  -     LDL cholesterol, direct  -     Lipid panel  -     CBC and differential  -     TSH, 3rd generation with Free T4 reflex  -     Urinalysis with reflex to microscopic  -     Hemoglobin A1C    Generalized anxiety disorder  -     Comprehensive metabolic panel; Future  -     LDL cholesterol, direct; Future  -     Lipid panel; Future  -     CBC and differential; Future  -     TSH, 3rd generation with Free T4 reflex; Future  -     Urinalysis with reflex to microscopic; Future  -     Hemoglobin A1C; Future  -     LORazepam (ATIVAN) 0 5 mg tablet; Take 1 tablet (0 5 mg total) by mouth daily as needed for anxiety  -     Comprehensive metabolic panel  -     LDL cholesterol, direct  -     Lipid panel  -     CBC and differential  -     TSH, 3rd generation with Free T4 reflex  -     Urinalysis with reflex to microscopic  -     Hemoglobin A1C    Screening for cardiovascular condition  -     Comprehensive metabolic panel;  Future  -     LDL cholesterol, direct; Future  -     Lipid panel; Future  -     CBC and differential; Future  -     TSH, 3rd generation with Free T4 reflex; Future  -     Urinalysis with reflex to microscopic; Future  -     Hemoglobin A1C; Future  -     Comprehensive metabolic panel  -     LDL cholesterol, direct  -     Lipid panel  -     CBC and differential  -     TSH, 3rd generation with Free T4 reflex  -     Urinalysis with reflex to microscopic  -     Hemoglobin A1C    Screening for diabetes mellitus  -     Comprehensive metabolic panel; Future  -     LDL cholesterol, direct; Future  -     Lipid panel; Future  -     CBC and differential; Future  -     TSH, 3rd generation with Free T4 reflex; Future  -     Urinalysis with reflex to microscopic; Future  -     Hemoglobin A1C; Future  -     Comprehensive metabolic panel  -     LDL cholesterol, direct  -     Lipid panel  -     CBC and differential  -     TSH, 3rd generation with Free T4 reflex  -     Urinalysis with reflex to microscopic  -     Hemoglobin A1C       Return in about 3 months (around 6/11/2019) for Recheck  Subjective:   Chief Complaint   Patient presents with    Follow-up     3 month checkup        Patient ID: Kati García is a 34 y o  female presents today for a routine checkup  Kati García is a 34 y o  female who presents today for a checkup of her depression and anxiety  She saw her OB today for an evaluation of her fibroids  She is going to be having an US  Her anxiety has been stable  There are good days and bad days  There are no suicidal thoughts  There were some panic attacks last week- there was a lot of stressful events last week  She did have to take the lorazepam last week- she is better now  Depression   This is a chronic problem  The current episode started more than 1 year ago  The problem occurs constantly  The problem has been unchanged   Pertinent negatives include no abdominal pain, anorexia, arthralgias, change in bowel habit, chest pain, chills, congestion, coughing, diaphoresis, fatigue, fever, headaches, joint swelling, myalgias, nausea, neck pain, numbness, rash, sore throat, swollen glands, urinary symptoms, vertigo, visual change, vomiting or weakness  The treatment provided no relief  Anxiety   Presents for follow-up visit  Symptoms include depressed mood, excessive worry, nervous/anxious behavior and obsessions  Patient reports no chest pain, compulsions, confusion, decreased concentration, dizziness, dry mouth, feeling of choking, hyperventilation, impotence, insomnia, irritability, malaise, muscle tension, nausea, palpitations, panic, restlessness, shortness of breath or suicidal ideas           The following portions of the patient's history were reviewed and updated as appropriate: allergies, current medications, past family history, past medical history, past social history, past surgical history and problem list   Patient Active Problem List   Diagnosis    Uterine enlargement    Irregular menses    Female pelvic pain    Depression    Generalized anxiety disorder    IUD contraception    Uterine fibroid     Past Medical History:   Diagnosis Date    Fibroid     Fullness in ear, left     Resolved [id number]    Hypertension     Resolved [id number]     Past Surgical History:   Procedure Laterality Date    CHOLECYSTECTOMY      TOOTH EXTRACTION       No Known Allergies  Family History   Problem Relation Age of Onset    Anxiety disorder Mother     Other Mother         Blood clots    Hypertension Mother     Alcohol abuse Father     Hypertension Brother     Anxiety disorder Maternal Grandmother     Other Maternal Grandmother         Blood clots    Lung cancer Maternal Grandfather     Breast cancer Family     Heart disease Family         cardiac disorder    Cancer Family     Diabetes Family     Cancer Family      Social History     Socioeconomic History    Marital status: /Civil Union     Spouse name: Not on file    Number of children: Not on file    Years of education: Not on file    Highest education level: Not on file   Occupational History    Not on file   Social Needs    Financial resource strain: Not on file    Food insecurity:     Worry: Not on file     Inability: Not on file    Transportation needs:     Medical: Not on file     Non-medical: Not on file   Tobacco Use    Smoking status: Never Smoker    Smokeless tobacco: Never Used   Substance and Sexual Activity    Alcohol use: Yes     Comment: occasional    Drug use: No    Sexual activity: Yes     Partners: Male     Birth control/protection: IUD   Lifestyle    Physical activity:     Days per week: Not on file     Minutes per session: Not on file    Stress: Not on file   Relationships    Social connections:     Talks on phone: Not on file     Gets together: Not on file     Attends Christian service: Not on file     Active member of club or organization: Not on file     Attends meetings of clubs or organizations: Not on file     Relationship status: Not on file    Intimate partner violence:     Fear of current or ex partner: Not on file     Emotionally abused: Not on file     Physically abused: Not on file     Forced sexual activity: Not on file   Other Topics Concern    Not on file   Social History Narrative    Always uses seat belt    Caffeine use    [employer] ([employer])     Current Outpatient Medications on File Prior to Visit   Medication Sig Dispense Refill    Azelastine HCl 137 MCG/SPRAY SOLN as needed      B-D ALLERGY SYRINGE 1CC/28G 28G X 1/2" 1 ML MISC       Cetirizine HCl (ZYRTEC ALLERGY) 10 MG CAPS Take by mouth      EPINEPHrine (EPIPEN) 0 3 mg/0 3 mL SOAJ as needed      escitalopram (LEXAPRO) 20 mg tablet Take 1 tablet (20 mg total) by mouth daily 90 tablet 1    levonorgestrel (MIRENA, 52 MG,) 20 MCG/24HR IUD 1 each by Intrauterine route once      montelukast (SINGULAIR) 10 mg tablet Take 1 tablet by mouth daily      [DISCONTINUED] LORazepam (ATIVAN) 0 5 mg tablet Take 1 tablet (0 5 mg total) by mouth daily as needed for anxiety (Patient taking differently: Take 0 5 mg by mouth as needed for anxiety  ) 30 tablet 0     No current facility-administered medications on file prior to visit  Review of Systems   Constitutional: Negative for chills, diaphoresis, fatigue, fever and irritability  HENT: Negative for congestion and sore throat  Respiratory: Negative for cough and shortness of breath  Cardiovascular: Negative for chest pain and palpitations  Gastrointestinal: Negative for abdominal pain, anorexia, change in bowel habit, nausea and vomiting  Genitourinary: Negative for impotence  Musculoskeletal: Negative for arthralgias, joint swelling, myalgias and neck pain  Skin: Negative for rash  Neurological: Negative for dizziness, vertigo, weakness, numbness and headaches  Psychiatric/Behavioral: Positive for depression  Negative for confusion, decreased concentration and suicidal ideas  The patient is nervous/anxious  The patient does not have insomnia  Objective:  Vitals:    03/11/19 1019   BP: 138/90   BP Location: Left arm   Patient Position: Sitting   Cuff Size: Large   Pulse: 96   Temp: 98 8 °F (37 1 °C)   TempSrc: Tympanic   Weight: (!) 146 kg (321 lb)   Height: 5' 8" (1 727 m)     Body mass index is 48 81 kg/m²  Wt Readings from Last 3 Encounters:   03/11/19 (!) 146 kg (321 lb)   03/11/19 (!) 145 kg (320 lb)   08/14/18 (!) 142 kg (312 lb)     Temp Readings from Last 3 Encounters:   03/11/19 98 8 °F (37 1 °C) (Tympanic)   07/18/18 98 7 °F (37 1 °C) (Tympanic)   12/07/17 98 4 °F (36 9 °C)     BP Readings from Last 3 Encounters:   03/11/19 138/90   03/11/19 140/90   08/14/18 130/80     Pulse Readings from Last 3 Encounters:   03/11/19 96   07/18/18 64   12/07/17 68        Physical Exam   Constitutional: She is oriented to person, place, and time  She appears well-developed and well-nourished  HENT:   Head: Normocephalic and atraumatic  Mouth/Throat: No oropharyngeal exudate  Eyes: Pupils are equal, round, and reactive to light  Conjunctivae and EOM are normal    Neck: Normal range of motion  No JVD present  No tracheal deviation present  No thyromegaly present  Cardiovascular: Normal rate, regular rhythm, normal heart sounds and intact distal pulses  Exam reveals no gallop and no friction rub  No murmur heard  Pulmonary/Chest: Effort normal and breath sounds normal  No stridor  No respiratory distress  She has no wheezes  She has no rales  She exhibits no tenderness  Abdominal: Soft  Bowel sounds are normal  She exhibits no distension and no mass  There is no tenderness  There is no rebound and no guarding  Musculoskeletal: Normal range of motion  She exhibits no edema, tenderness or deformity  Lymphadenopathy:     She has no cervical adenopathy  Neurological: She is alert and oriented to person, place, and time  She has normal reflexes  She displays normal reflexes  No cranial nerve deficit  She exhibits normal muscle tone  Coordination normal    Skin: Skin is warm and dry  No visits with results within 2 Month(s) from this visit  Latest known visit with results is:   Procedure visit on 07/16/2018   Component Date Value    URINE HCG 07/16/2018 negative       BMI Counseling: Body mass index is 48 81 kg/m²  Discussed the patient's BMI with her  The BMI is above average  BMI counseling and education was provided to the patient  Nutrition recommendations include reducing portion sizes, decreasing overall calorie intake, 3-5 servings of fruits/vegetables daily, reducing fast food intake, consuming healthier snacks, moderation in carbohydrate intake, increasing intake of lean protein, reducing intake of saturated fat and trans fat and reducing intake of cholesterol  Exercise recommendations include exercising 3-5 times per week and strength training exercises

## 2019-03-11 NOTE — PROGRESS NOTES
Assessment/Plan   Diagnoses and all orders for this visit:    Encounter for screening mammogram for malignant neoplasm of breast  -     Mammo screening bilateral w cad; Future    Uterine enlargement    Female pelvic pain    Irregular menses    Uterine leiomyoma, unspecified location    IUD contraception    Women's annual routine gynecological examination     1  Yearly exam-Pap smear deferred, self-breast awareness reviewed, calcium/vitamin-D recommendations discussed, mammogram request given  2  Mirena IUD-placed 7/16/18  Overall, patient is doing well  IUD string was seen at a length of 3 cm  We will check position on upcoming ultrasound  3  Uterine myomas-most recent ultrasound January 2018 with multiple myomas measuring 7 1, 3 1, 4 8, and 6 7 cm diffusely throughout the uterus  Uterus does appear to be enlarged on exam, 16-18 week size which is increased from prior visit in 2017 when seen by me  Given her recent discomfort, we will obtain ultrasound and we will assess myomas  4  Pelvic cramping/left adnexal tenderness-noted about 1 week ago, resolved over a few days time but then increased with intercourse yesterday  Exam was notable for left adnexal tenderness without masses appreciated  We will assess with upcoming ultrasound  5  Irregular bleeding-since IUD inserted, menses have been at 2-3 week intervals but very light only lasting 1 day  Last cycle  started 3/7/19 and just recently ended and was quite heavy     She will continue to follow this  6  Concern for breast lump-patient's  thought he felt something underneath the breast possibly on the chest wall  It appeared to be in the location where the underwire bra was  No definite masses were appreciated on exam today and the patient and  were reassured  Request for mammogram was given and she will get this done shortly  She will follow-up in next few weeks for ultrasound and office visit with me      Subjective   Patient ID: Nikia Gonzalez is a 34 y o  female  There were no vitals filed for this visit  Patient was seen today for yearly exam   Please see assessment plan for details        The following portions of the patient's history were reviewed and updated as appropriate: allergies, current medications, past family history, past medical history, past social history, past surgical history and problem list   Past Medical History:   Diagnosis Date    Fibroid     Fullness in ear, left     Resolved [id number]    Hypertension     Resolved      Past Surgical History:   Procedure Laterality Date    CHOLECYSTECTOMY      TOOTH EXTRACTION       OB History    Para Term  AB Living   0 0 0 0 0 0   SAB TAB Ectopic Multiple Live Births   0 0 0 0 0       Current Outpatient Medications:     Azelastine HCl 137 MCG/SPRAY SOLN, as needed, Disp: , Rfl:     B-D ALLERGY SYRINGE 1CC/28G 28G X 1/2" 1 ML MISC, , Disp: , Rfl:     Cetirizine HCl (ZYRTEC ALLERGY) 10 MG CAPS, Take by mouth, Disp: , Rfl:     EPINEPHrine (EPIPEN) 0 3 mg/0 3 mL SOAJ, as needed, Disp: , Rfl:     escitalopram (LEXAPRO) 20 mg tablet, Take 1 tablet (20 mg total) by mouth daily, Disp: 90 tablet, Rfl: 1    levonorgestrel (MIRENA, 52 MG,) 20 MCG/24HR IUD, 1 each by Intrauterine route once, Disp: , Rfl:     LORazepam (ATIVAN) 0 5 mg tablet, Take 1 tablet (0 5 mg total) by mouth daily as needed for anxiety (Patient taking differently: Take 0 5 mg by mouth as needed for anxiety  ), Disp: 30 tablet, Rfl: 0    montelukast (SINGULAIR) 10 mg tablet, Take 1 tablet by mouth daily, Disp: , Rfl:   No Known Allergies  Social History     Socioeconomic History    Marital status: /Civil Union     Spouse name: None    Number of children: None    Years of education: None    Highest education level: None   Occupational History    None   Social Needs    Financial resource strain: None    Food insecurity:     Worry: None     Inability: None    Transportation needs: Medical: None     Non-medical: None   Tobacco Use    Smoking status: Never Smoker    Smokeless tobacco: Never Used   Substance and Sexual Activity    Alcohol use: Yes     Comment: occasional    Drug use: No    Sexual activity: Yes     Partners: Male     Birth control/protection: IUD   Lifestyle    Physical activity:     Days per week: None     Minutes per session: None    Stress: None   Relationships    Social connections:     Talks on phone: None     Gets together: None     Attends Voodoo service: None     Active member of club or organization: None     Attends meetings of clubs or organizations: None     Relationship status: None    Intimate partner violence:     Fear of current or ex partner: None     Emotionally abused: None     Physically abused: None     Forced sexual activity: None   Other Topics Concern    None   Social History Narrative    Always uses seat belt    Caffeine use    [employer]     Family History   Problem Relation Age of Onset    Anxiety disorder Mother     Other Mother         Blood clots    Hypertension Mother     Alcohol abuse Father     Hypertension Brother     Anxiety disorder Maternal Grandmother     Other Maternal Grandmother         Blood clots    Lung cancer Maternal Grandfather     Breast cancer Family     Heart disease Family         cardiac disorder    Cancer Family     Diabetes Family     Cancer Family        Review of Systems   Constitutional: Negative for chills, diaphoresis, fatigue and fever  Respiratory: Negative for apnea, cough, chest tightness, shortness of breath and wheezing  Cardiovascular: Negative for chest pain, palpitations and leg swelling  Gastrointestinal: Negative for abdominal distention, abdominal pain, anal bleeding, constipation, diarrhea, nausea, rectal pain and vomiting     Genitourinary: Negative for difficulty urinating, dyspareunia, dysuria, frequency, hematuria, menstrual problem, pelvic pain, urgency, vaginal bleeding, vaginal discharge and vaginal pain  Musculoskeletal: Negative for arthralgias, back pain and myalgias  Skin: Negative for color change and rash  Neurological: Negative for dizziness, syncope, light-headedness, numbness and headaches  Hematological: Negative for adenopathy  Does not bruise/bleed easily  Psychiatric/Behavioral: Negative for dysphoric mood and sleep disturbance  The patient is not nervous/anxious  Objective   Physical Exam    Objective      Ht 5' 8" (1 727 m)   Wt (!) 145 kg (320 lb)   LMP 03/07/2019   BMI 48 66 kg/m²     General:   alert and oriented, in no acute distress   Neck: normal to inspection and palpation   Breast: normal appearance, no masses or tenderness   Heart:    Lungs:    Abdomen: soft, non-tender, without masses or organomegaly   Vulva: normal   Vagina: Without erythema or lesions or discharge  Normal   Cervix: Without lesions or discharge or cervicitis  No Cervical motion tenderness   Uterus: mid-position, non-tender, 16-18 week size   Adnexa: Left is mildly tender without masses appreciated  Right was negative   Rectum: No masses appreciated, slight tenderness toward the left adnexa      Psych:  Normal mood and affect   Skin:  Without obvious lesions   Eyes: symmetric, with normal movements and reactivity   Musculoskeletal:  Normal muscle tone and movements appreciated

## 2019-03-11 NOTE — PATIENT INSTRUCTIONS
Intrauterine Device   WHAT YOU NEED TO KNOW:   What is an intrauterine device? An intrauterine device (IUD) is a type of birth control that is inserted into your uterus  It is a small, flexible piece of plastic with a string on the end  It is inserted and removed by your healthcare provider  IUDs prevent sperm from reaching or fertilizing an egg  IUDs also prevent a fertilized egg from attaching to the uterus and developing into a fetus  What are the most common types of IUDs? · Copper: This type of IUD slowly releases a small amount of copper into your uterus  This IUD can remain in place for up to 10 years  · Hormone-releasing: This type of IUD slowly releases a small amount of progesterone into your uterus  Progesterone is a hormone that is made by your body to help control your periods  This IUD can remain in place for up to 5 years  What are the advantages of an IUD? · Effective: An IUD is 98% to 99% effective in preventing pregnancy  · Easily removable: The IUD can be removed if you decide to have a baby  You may be able to get pregnant as soon as the IUD is removed  · Immediate:  An IUD protects you from pregnancy right after it is inserted  · Convenient:  You do not have to stop sexual activity to insert it or worry about remembering to take your birth control pill  · Safe:  Copper IUDs are safer for some women than oral birth control pills  Examples include women who smoke or have a history of blood clots  · Health effects:  Hormone-releasing IUDs may decrease certain health problems  Examples include bleeding and cramping that happen with your monthly period  What are the risks of an IUD? · An IUD does not protect you from sexually transmitted infections  You may have cramps during the first weeks after you get the IUD  A copper IUD may cause your monthly period to be heavier or more painful  This is more common within the first 3 months after you get the IUD   You may need to have your IUD removed if your bleeding or pain becomes severe  You may have spotting between periods  · There is a small chance that you could get pregnant  Sometimes the IUD cannot be removed after you get pregnant  This increases your risk of a miscarriage or an ectopic pregnancy  Ectopic pregnancy is when the fertilized egg starts to grow somewhere other than your uterus  There is a small risk of an infection within the first 20 days after the IUD is placed  Infection can lead to pelvic inflammatory disease  This can cause infertility  Your uterus may tear when the IUD is inserted  The IUD may slip part or all of the way out of your uterus  How is the IUD inserted? · The IUD is usually inserted during your monthly period  This may help decrease the amount of discomfort you have during the procedure  It also helps ensure that you are not pregnant  You will be asked to lie down and place your feet in stirrups  Your healthcare provider will gently insert a speculum into your vagina  This is the same tool used during a pap smear  The speculum allows your healthcare provider to see inside your vagina to your cervix  The cervix is the opening of your uterus  · Your healthcare provider will clean your cervix with an antiseptic solution to prevent infection  You may be given numbing medicine  A long plastic tube is gently passed through your cervix and into your uterus  This tube has the IUD inside of it  The IUD is pushed out of the tube and into your uterus  You may have cramps as the IUD is inserted  The tube is removed after the IUD is in place  How can I make sure my IUD is still in place? An IUD has a string made of plastic thread  One to 2 inches of this string hangs into your vagina  You cannot see this string, and it will not cause problems when you have sex  Check your IUD string every 3 days for the first 3 months after it is inserted  After that, check the string after each monthly period   Do the following to check the placement of your IUD:  · Wash your hands with soap and warm water  Dry them with a clean towel  · Bend your knees and squat low to the ground  · Gently put your index finger inside your vagina  The cervix is at the top of the vagina and feels like the tip of your nose  Feel for the IUD string  Do not pull on the string  You should not be able to feel the firm plastic of the IUD itself  · Wash your hands after you check your IUD string  Where can I find more information? · [facility] [address] , [address] [place]  Phone: 3- [phone]  Web Address: https://My[employer]/  org  When should I contact my healthcare provider? · You think you are pregnant  · You bleed after you have sex  · You have pain during sex  · You cannot feel the IUD string, the string feels longer, or you feel the plastic of the IUD itself  · You have vaginal discharge that is green, yellow, or has a foul odor  · You have questions or concerns about your condition or care  When should I seek immediate care? · You have severe pain or bleeding during your period  · You have a fever and severe abdominal pain  CARE AGREEMENT:   You have the right to help plan your care  Learn about your health condition and how it may be treated  Discuss treatment options with your caregivers to decide what care you want to receive  You always have the right to refuse treatment  The above information is an  only  It is not intended as medical advice for individual conditions or treatments  Talk to your doctor, nurse or pharmacist before following any medical regimen to see if it is safe and effective for you  © 2017 [facility] Information is for End User's use only and may not be sold, redistributed or otherwise used for commercial purposes   All illustrations and images included in CareNotes® are the copyrighted property of A D A M , Inc  or [employer]  Uterine Fibroids   WHAT YOU NEED TO KNOW:   What are uterine fibroids? Uterine fibroids are growths found inside your uterus (womb)  Uterine fibroids also may be called tumors (lumps) or leiomyomas  Uterine fibroids often appear in groups, or you may have only one  They can be small or large, and they can grow in size  They are almost always benign (not cancer) and likely will not spread to other parts of your body  What increases my risk of getting uterine fibroids? The cause of uterine fibroids is not clear  Ask your healthcare provider about these and other risk factors for uterine fibroids:  · Heredity:  Your risk for uterine fibroids may increase if a close family member also has fibroids  · Hormone imbalance:  Hormones are chemicals that affect your growth  Too many hormones may cause uterine fibroids or make them grow  · Early onset of menstrual periods: If you started your period at an early age, you may be at risk for uterine fibroids  · Childbearing age:  Uterine fibroids occur more often in women of childbearing age  They are even more common when you are 36to 61years old  They may grow when you are pregnant and shrink after you no longer have a monthly period  · Excess weight:  Too much body weight may increase your hormone levels and lead to uterine fibroids  Ask your healthcare provider about your ideal weight for your height  What are the signs and symptoms of uterine fibroids? You may have no signs or symptoms  Symptoms depend on the size, type, and number of fibroids you have  Symptoms also depend on where the fibroids are inside your uterus  Signs and symptoms of fibroids include the following:  · Heavy or painful menstrual bleeding  · Pelvic pressure and pain  You may have increased pelvic pain during sex  · Constipation or pain when you have a bowel movement (BM)  · Frequent need to urinate    How are uterine fibroids diagnosed? Ask your healthcare provider about these and other tests that you may need:  · Blood tests: You may need blood taken to give caregivers information about how your body is working  The blood may be taken from your hand, arm, or IV  · Pelvic exam:  This is also called an internal or vaginal exam  During a pelvic exam, your healthcare provider gently puts a speculum into your vagina  A speculum is a tool that opens your vagina  This lets your healthcare provider see your cervix (bottom part of your uterus)  With gloved hands, your healthcare provider will check the size and shape of your uterus and ovaries  · Vaginal ultrasound:  During this test, your healthcare provider places a small wand in your vagina  Sound waves from the wand show pictures of the inside of your uterus (womb) and ovaries  · Biopsy:  A biopsy is a tissue sample of a fibroid that your healthcare provider takes from your uterus for testing  How are uterine fibroids treated? You may not need treatment for your fibroids if you do not have symptoms  The following treatments may shrink your fibroids and help your pain:  · Medicines:     ¨ Hormone medicine: This medicine changes the level of certain hormones and may then help shrink your fibroids  ¨ Contraceptives: These medicines help prevent pregnancy  They also may help shrink your fibroids  ¨ Nonsteroidal anti-inflammatory medicine: This group of medicine is also called NSAIDs  Nonsteroidal anti-inflammatory medicine may help decrease pain, fever, and swelling  This medicine can be bought without a doctor's order  This medicine can cause stomach bleeding or kidney problems in certain people  · Surgery: The type of surgery you may have depends on the size, number, and location of your fibroids  Ask your healthcare provider for more information about the following:     ¨ Embolization:   This surgery blocks or slows the flow of blood to the fibroid  This may make your fibroids shrink or disappear  ¨ Myomectomy: This surgery removes your uterine fibroids  ¨ Hysterectomy:  For this surgery, your healthcare provider removes your uterus from your body  You may need a hysterectomy if your condition is severe (very bad)  After this surgery, you will no longer be able to have children  When should I contact my healthcare provider? · Your symptoms, such as heavy bleeding, pain, or pelvic pressure, worsen  · You feel weak and are more tired than usual      · You do not feel like your bladder is empty after you urinate  You also may urinate small amounts more often  · You have more trouble having or are not able to have a BM  · You have new or worse hot flashes  · You have any questions about your condition or care  When should I seek immediate care or call 911? · Your heart begins to race, and you feel faint  · You begin to pass large blood clots from your vagina  CARE AGREEMENT:   You have the right to help plan your care  Learn about your health condition and how it may be treated  Discuss treatment options with your caregivers to decide what care you want to receive  You always have the right to refuse treatment  The above information is an  only  It is not intended as medical advice for individual conditions or treatments  Talk to your doctor, nurse or pharmacist before following any medical regimen to see if it is safe and effective for you  © 2017 [address] Information is for End User's use only and may not be sold, redistributed or otherwise used for commercial purposes  All illustrations and images included in CareNotes® are the copyrighted property of A D A M , Inc  or [employer]

## 2019-03-19 ENCOUNTER — TELEPHONE (OUTPATIENT)
Dept: BEHAVIORAL/MENTAL HEALTH CLINIC | Facility: CLINIC | Age: 39
End: 2019-03-19

## 2019-03-19 NOTE — TELEPHONE ENCOUNTER
This writer outreached to Darlene and left a message reviewing that this writer's day at [place] is changing from [address] [facility] to [facility] and requested that Ruston call back to reschedule her appointment to Monday

## 2019-03-25 ENCOUNTER — OFFICE VISIT (OUTPATIENT)
Dept: OBGYN CLINIC | Facility: CLINIC | Age: 39
End: 2019-03-25
Payer: COMMERCIAL

## 2019-03-25 ENCOUNTER — ULTRASOUND (OUTPATIENT)
Dept: OBGYN CLINIC | Facility: CLINIC | Age: 39
End: 2019-03-25
Payer: COMMERCIAL

## 2019-03-25 VITALS — BODY MASS INDEX: 48.81 KG/M2 | SYSTOLIC BLOOD PRESSURE: 130 MMHG | DIASTOLIC BLOOD PRESSURE: 88 MMHG | WEIGHT: 293 LBS

## 2019-03-25 DIAGNOSIS — Z97.5 IUD CONTRACEPTION: Primary | ICD-10-CM

## 2019-03-25 DIAGNOSIS — D21.9 FIBROIDS: Primary | ICD-10-CM

## 2019-03-25 DIAGNOSIS — R10.2 FEMALE PELVIC PAIN: ICD-10-CM

## 2019-03-25 DIAGNOSIS — D25.9 UTERINE LEIOMYOMA, UNSPECIFIED LOCATION: ICD-10-CM

## 2019-03-25 DIAGNOSIS — N92.6 IRREGULAR MENSES: ICD-10-CM

## 2019-03-25 PROCEDURE — 99214 OFFICE O/P EST MOD 30 MIN: CPT | Performed by: OBSTETRICS & GYNECOLOGY

## 2019-03-25 PROCEDURE — 76830 TRANSVAGINAL US NON-OB: CPT

## 2019-03-25 PROCEDURE — 76856 US EXAM PELVIC COMPLETE: CPT

## 2019-03-25 NOTE — PROGRESS NOTES
AMB US Pelvic Non OB  Date/Time: 3/25/2019 7:20 AM  Performed by: Medina Altman  Authorized by: Mias Bryant MD     Procedure details:     Indications: leiomyoma      Technique:  US Pelvic, Non-OB with complete exam    Position: supine exam    Uterine findings:     Diameter (mm):  106    Length (mm):  183    Width (mm):  149    Endometrial stripe: identified      Endometrium thickness (mm):  12 9  Left ovary findings:     Left ovary:  Visualized    Diameter (mm):  15 9    Length (mm):  31 2    Width (mm):  25 4  Right ovary findings:     Right ovary:  Visualized    Diameter (mm):  19 7    Length (mm):  30 5    Width (mm):  19 3  Other findings:     Free pelvic fluid: not identified      Free peritoneal fluid: not identified    Post-Procedure Details:     Impression:  Enlarged anteverted uterus is inhomogeneous throughout and contains multiple fibroids  Largest are fundal posterior subserosal 9 8cm (previously 7 1cm), lower uterine segment right intramural 5 3cm (previously 3 1cm), right anterior intramural 6 8cm (previously 4 8cm), and left intramural 7 3cm (previously 6 7cm); all larger than last ultrasound  The endometrium is only partially seen due to large fibroid  There is an IUD placed within the cervix  The bilateral ovaries appear within normal limits  No free fluid  Tolerance: Tolerated well, no immediate complications    Complications: no complications    Additional Procedure Comments:      Transabdominal and transvaginal images were obtained  Siemens Acuson X150 [id number] transvaginal transducer Serial # [id number] was used to perform the examination today and subsequently followed with high level disinfection utilizing Trophon EPR procedure

## 2019-03-25 NOTE — PATIENT INSTRUCTIONS
Hysterectomy   WHAT YOU NEED TO KNOW:   What do I need to know about a hysterectomy? A hysterectomy is surgery to remove your uterus  Your ovaries, fallopian tubes, cervix, or part of your vagina may also need to be removed  The organs and tissue that will be removed depends on your medical condition  How do I prepare for a hysterectomy? Your healthcare provider will talk to you about how to prepare for surgery  He may tell you not to eat or drink anything after midnight on the day of your surgery  You will need to stop taking aspirin 7 to 10 days before your procedure  You will need to stop taking NSAIDs 3 days before you procedure  You will also need to stop taking certain herbal supplements 7 days before your procedure  These include garlic, gingko biloba, and ginseng  Your provider will tell you what medicines to take or not take on the day of your surgery  You will be given an antibiotic through your IV to help prevent a bacterial infection  Arrange for someone to drive you home and stay with you after surgery  What will happen during a hysterectomy? · You may be given general anesthesia to keep you asleep and free from pain during surgery  You may instead be given regional anesthesia to numb the lower part of your body  Your uterus may be removed through your vagina (vaginal hysterectomy) or through a an incision in your abdomen (abdominal hysterectomy)  It may also be done through several small incisions in your abdomen (laparoscopic hysterectomy)  During a laparoscopic hysterectomy, a laparoscope and other tools will be put into your abdomen through the small incisions  The laparoscope is a long metal tube with a light and camera on the end  Your abdomen will be filled with a gas  This allows your surgeon to see inside your abdomen more clearly  · Your surgeon will cut and tie the ligaments that hold your uterus in place   The blood vessels that go to your uterus will also be tied to help decrease bleeding  Your surgeon may also remove other organs or tissue such as your ovaries, fallopian tubes, cervix, lymph nodes, or part of your vagina  · Your surgeon may instead use robotic arms to place a laparoscope and other tools inside your abdomen through the incisions  He will use the machine to look inside your abdomen and guide the robotic arms  He will use the tools attached to the robotic arms to remove your uterus, cervix, or other tissues  · Any incisions that were made during surgery will be closed with stitches, staples, surgical glue, or surgical tape  The incisions may be covered with a bandage  A vaginal pack or sanitary pad may be used to absorb the bleeding  A vaginal pack is a special gauze that is inserted into the vagina  It is removed before you go home or to a hospital room  What will happen after a hysterectomy? You may have a catheter to help drain your bladder for up to 24 hours  You may also have pain in your shoulders or near your ribs if gas was put in your abdomen  You will have pain for the first few days after surgery  You will need to wear sanitary pads for vaginal bleeding that occurs after surgery  You will be asked to walk as soon as possible after surgery  This will help to prevent blood clots in your legs  You may need to stay in the hospital for 1 to 4 days after surgery  The length of time depends on the type of hysterectomy you had  What are the risks of a hysterectomy? · The surgeon may need to change the type of surgery he was planning to do  For example, he may need to change from a laparoscopic surgery to an open abdominal surgery  You will not be able to become pregnant after you have a hysterectomy  You will go through menopause if your ovaries are removed  · You may bleed more than expected or get an infection  Your bladder, ureters, or bowels may be damaged during surgery   If your ureters were injured, you may need a catheter to drain your bladder for several days to weeks  You may get scar tissue in your abdomen that blocks your intestine or causes pelvic pain  If you have a hysterectomy to treat cancer, this surgery may not take it away completely  There is also a chance that the cancer may return  You may get a blood clot in your leg or arm  This may become life-threatening  CARE AGREEMENT:   You have the right to help plan your care  Learn about your health condition and how it may be treated  Discuss treatment options with your caregivers to decide what care you want to receive  You always have the right to refuse treatment  The above information is an  only  It is not intended as medical advice for individual conditions or treatments  Talk to your doctor, nurse or pharmacist before following any medical regimen to see if it is safe and effective for you  © 2017 [address] Information is for End User's use only and may not be sold, redistributed or otherwise used for commercial purposes  All illustrations and images included in CareNotes® are the copyrighted property of [employer]  or [employer]

## 2019-03-26 LAB
ALBUMIN SERPL-MCNC: 3.8 G/DL (ref 3.5–5.5)
ALBUMIN/GLOB SERPL: 1.6 {RATIO} (ref 1.2–2.2)
ALP SERPL-CCNC: 82 IU/L (ref 39–117)
ALT SERPL-CCNC: 11 IU/L (ref 0–32)
APPEARANCE UR: CLEAR
AST SERPL-CCNC: 11 IU/L (ref 0–40)
BASOPHILS # BLD AUTO: 0 X10E3/UL (ref 0–0.2)
BASOPHILS NFR BLD AUTO: 0 %
BILIRUB SERPL-MCNC: <0.2 MG/DL (ref 0–1.2)
BILIRUB UR QL STRIP: NEGATIVE
BUN SERPL-MCNC: 11 MG/DL (ref 6–24)
BUN/CREAT SERPL: 15 (ref 9–23)
CALCIUM SERPL-MCNC: 8.7 MG/DL (ref 8.7–10.2)
CHLORIDE SERPL-SCNC: 104 MMOL/L (ref 96–106)
CHOLEST SERPL-MCNC: 183 MG/DL (ref 100–199)
CHOLEST/HDLC SERPL: 4 RATIO (ref 0–4.4)
CO2 SERPL-SCNC: 24 MMOL/L (ref 20–29)
COLOR UR: YELLOW
CREAT SERPL-MCNC: 0.72 MG/DL (ref 0.57–1)
EOSINOPHIL # BLD AUTO: 0.1 X10E3/UL (ref 0–0.4)
EOSINOPHIL NFR BLD AUTO: 1 %
ERYTHROCYTE [DISTWIDTH] IN BLOOD BY AUTOMATED COUNT: 15.6 % (ref 12.3–15.4)
EST. AVERAGE GLUCOSE BLD GHB EST-MCNC: 120 MG/DL
GLOBULIN SER-MCNC: 2.4 G/DL (ref 1.5–4.5)
GLUCOSE SERPL-MCNC: 91 MG/DL (ref 65–99)
GLUCOSE UR QL: NEGATIVE
HBA1C MFR BLD: 5.8 % (ref 4.8–5.6)
HCT VFR BLD AUTO: 38.9 % (ref 34–46.6)
HDLC SERPL-MCNC: 46 MG/DL
HGB BLD-MCNC: 12.6 G/DL (ref 11.1–15.9)
HGB UR QL STRIP: NEGATIVE
IMM GRANULOCYTES # BLD: 0 X10E3/UL (ref 0–0.1)
IMM GRANULOCYTES NFR BLD: 0 %
KETONES UR QL STRIP: NEGATIVE
LDLC SERPL CALC-MCNC: 108 MG/DL (ref 0–99)
LDLC SERPL DIRECT ASSAY-MCNC: 124 MG/DL (ref 0–99)
LEUKOCYTE ESTERASE UR QL STRIP: NEGATIVE
LYMPHOCYTES # BLD AUTO: 1.8 X10E3/UL (ref 0.7–3.1)
LYMPHOCYTES NFR BLD AUTO: 26 %
MCH RBC QN AUTO: 27.2 PG (ref 26.6–33)
MCHC RBC AUTO-ENTMCNC: 32.4 G/DL (ref 31.5–35.7)
MCV RBC AUTO: 84 FL (ref 79–97)
MICRO URNS: NORMAL
MONOCYTES # BLD AUTO: 0.4 X10E3/UL (ref 0.1–0.9)
MONOCYTES NFR BLD AUTO: 6 %
NEUTROPHILS # BLD AUTO: 4.6 X10E3/UL (ref 1.4–7)
NEUTROPHILS NFR BLD AUTO: 67 %
NITRITE UR QL STRIP: NEGATIVE
PH UR STRIP: 6.5 [PH] (ref 5–7.5)
PLATELET # BLD AUTO: 253 X10E3/UL (ref 150–379)
POTASSIUM SERPL-SCNC: 4.1 MMOL/L (ref 3.5–5.2)
PROT SERPL-MCNC: 6.2 G/DL (ref 6–8.5)
PROT UR QL STRIP: NEGATIVE
RBC # BLD AUTO: 4.64 X10E6/UL (ref 3.77–5.28)
SL AMB EGFR AFRICAN AMERICAN: 121 ML/MIN/1.73
SL AMB EGFR NON AFRICAN AMERICAN: 105 ML/MIN/1.73
SL AMB VLDL CHOLESTEROL CALC: 29 MG/DL (ref 5–40)
SODIUM SERPL-SCNC: 140 MMOL/L (ref 134–144)
SP GR UR: 1.02 (ref 1–1.03)
TRIGL SERPL-MCNC: 144 MG/DL (ref 0–149)
TSH SERPL DL<=0.005 MIU/L-ACNC: 2.61 UIU/ML (ref 0.45–4.5)
UROBILINOGEN UR STRIP-ACNC: 0.2 EU/DL (ref 0.2–1)
WBC # BLD AUTO: 7 X10E3/UL (ref 3.4–10.8)

## 2019-04-02 PROBLEM — R10.2 PELVIC PAIN: Status: ACTIVE | Noted: 2019-04-02

## 2019-04-02 PROBLEM — D21.9 FIBROID: Status: ACTIVE | Noted: 2019-04-02

## 2019-04-22 ENCOUNTER — HOSPITAL ENCOUNTER (OUTPATIENT)
Dept: MAMMOGRAPHY | Facility: HOSPITAL | Age: 39
Discharge: HOME | End: 2019-04-22
Attending: OBSTETRICS & GYNECOLOGY
Payer: COMMERCIAL

## 2019-04-22 VITALS — WEIGHT: 293 LBS | HEIGHT: 68 IN | BODY MASS INDEX: 44.41 KG/M2

## 2019-04-22 DIAGNOSIS — Z12.31 ENCOUNTER FOR SCREENING MAMMOGRAM FOR MALIGNANT NEOPLASM OF BREAST: ICD-10-CM

## 2019-04-22 PROCEDURE — 77067 SCR MAMMO BI INCL CAD: CPT

## 2019-05-26 DIAGNOSIS — F32.A DEPRESSION, UNSPECIFIED DEPRESSION TYPE: ICD-10-CM

## 2019-05-27 RX ORDER — ESCITALOPRAM OXALATE 20 MG/1
TABLET ORAL
Qty: 90 TABLET | Refills: 1 | OUTPATIENT
Start: 2019-05-27

## 2019-06-02 DIAGNOSIS — F41.1 GENERALIZED ANXIETY DISORDER: ICD-10-CM

## 2019-06-02 DIAGNOSIS — F32.A DEPRESSION, UNSPECIFIED DEPRESSION TYPE: ICD-10-CM

## 2019-06-03 RX ORDER — ESCITALOPRAM OXALATE 20 MG/1
20 TABLET ORAL DAILY
Qty: 90 TABLET | Refills: 1 | Status: SHIPPED | OUTPATIENT
Start: 2019-06-03 | End: 2019-12-10 | Stop reason: SDUPTHER

## 2019-06-03 RX ORDER — LORAZEPAM 0.5 MG/1
0.5 TABLET ORAL DAILY PRN
Qty: 30 TABLET | Refills: 0 | Status: SHIPPED | OUTPATIENT
Start: 2019-06-03 | End: 2019-12-10 | Stop reason: SDUPTHER

## 2019-06-12 ENCOUNTER — APPOINTMENT (OUTPATIENT)
Dept: LAB | Facility: HOSPITAL | Age: 39
End: 2019-06-12
Attending: OBSTETRICS & GYNECOLOGY
Payer: COMMERCIAL

## 2019-06-12 ENCOUNTER — HOSPITAL ENCOUNTER (OUTPATIENT)
Dept: NON INVASIVE DIAGNOSTICS | Facility: HOSPITAL | Age: 39
Discharge: HOME | End: 2019-06-12
Attending: OBSTETRICS & GYNECOLOGY
Payer: COMMERCIAL

## 2019-06-12 DIAGNOSIS — Z01.818 PRE-OP TESTING: ICD-10-CM

## 2019-06-12 LAB
ABO GROUP BLD: NORMAL
ANION GAP SERPL CALCULATED.3IONS-SCNC: 10 MMOL/L (ref 4–13)
ATRIAL RATE: 82 BPM
BLD GP AB SCN SERPL QL: NEGATIVE
BUN SERPL-MCNC: 10 MG/DL (ref 5–25)
CALCIUM SERPL-MCNC: 9.1 MG/DL (ref 8.3–10.1)
CHLORIDE SERPL-SCNC: 104 MMOL/L (ref 100–108)
CO2 SERPL-SCNC: 25 MMOL/L (ref 21–32)
CREAT SERPL-MCNC: 0.67 MG/DL (ref 0.6–1.3)
ERYTHROCYTE [DISTWIDTH] IN BLOOD BY AUTOMATED COUNT: 14.1 % (ref 11.6–15.1)
EST. AVERAGE GLUCOSE BLD GHB EST-MCNC: 114 MG/DL
GFR SERPL CREATININE-BSD FRML MDRD: 110 ML/MIN/1.73SQ M
GLUCOSE SERPL-MCNC: 107 MG/DL (ref 65–140)
HBA1C MFR BLD: 5.6 % (ref 4.2–6.3)
HCT VFR BLD AUTO: 42.2 % (ref 34.8–46.1)
HGB BLD-MCNC: 13.2 G/DL (ref 11.5–15.4)
MCH RBC QN AUTO: 27.6 PG (ref 26.8–34.3)
MCHC RBC AUTO-ENTMCNC: 31.3 G/DL (ref 31.4–37.4)
MCV RBC AUTO: 88 FL (ref 82–98)
P AXIS: 9 DEGREES
PLATELET # BLD AUTO: 263 THOUSANDS/UL (ref 149–390)
PMV BLD AUTO: 9.5 FL (ref 8.9–12.7)
POTASSIUM SERPL-SCNC: 3.7 MMOL/L (ref 3.5–5.3)
PR INTERVAL: 162 MS
QRS AXIS: 9 DEGREES
QRSD INTERVAL: 92 MS
QT INTERVAL: 372 MS
QTC INTERVAL: 434 MS
RBC # BLD AUTO: 4.78 MILLION/UL (ref 3.81–5.12)
RH BLD: POSITIVE
SODIUM SERPL-SCNC: 139 MMOL/L (ref 136–145)
SPECIMEN EXPIRATION DATE: NORMAL
T WAVE AXIS: -10 DEGREES
VENTRICULAR RATE: 82 BPM
WBC # BLD AUTO: 8.13 THOUSAND/UL (ref 4.31–10.16)

## 2019-06-12 PROCEDURE — 83036 HEMOGLOBIN GLYCOSYLATED A1C: CPT

## 2019-06-12 PROCEDURE — 86850 RBC ANTIBODY SCREEN: CPT

## 2019-06-12 PROCEDURE — 80048 BASIC METABOLIC PNL TOTAL CA: CPT

## 2019-06-12 PROCEDURE — 85027 COMPLETE CBC AUTOMATED: CPT

## 2019-06-12 PROCEDURE — 93010 ELECTROCARDIOGRAM REPORT: CPT | Performed by: INTERNAL MEDICINE

## 2019-06-12 PROCEDURE — 86900 BLOOD TYPING SEROLOGIC ABO: CPT

## 2019-06-12 PROCEDURE — 36415 COLL VENOUS BLD VENIPUNCTURE: CPT

## 2019-06-12 PROCEDURE — 86901 BLOOD TYPING SEROLOGIC RH(D): CPT

## 2019-06-12 PROCEDURE — 93005 ELECTROCARDIOGRAM TRACING: CPT

## 2019-06-14 ENCOUNTER — OFFICE VISIT (OUTPATIENT)
Dept: FAMILY MEDICINE CLINIC | Facility: CLINIC | Age: 39
End: 2019-06-14
Payer: COMMERCIAL

## 2019-06-14 VITALS
DIASTOLIC BLOOD PRESSURE: 78 MMHG | HEIGHT: 68 IN | OXYGEN SATURATION: 98 % | BODY MASS INDEX: 44.41 KG/M2 | RESPIRATION RATE: 15 BRPM | TEMPERATURE: 97.3 F | SYSTOLIC BLOOD PRESSURE: 126 MMHG | HEART RATE: 100 BPM | WEIGHT: 293 LBS

## 2019-06-14 DIAGNOSIS — F41.1 GENERALIZED ANXIETY DISORDER: ICD-10-CM

## 2019-06-14 DIAGNOSIS — F32.A DEPRESSION, UNSPECIFIED DEPRESSION TYPE: Primary | ICD-10-CM

## 2019-06-14 PROCEDURE — 99213 OFFICE O/P EST LOW 20 MIN: CPT | Performed by: FAMILY MEDICINE

## 2019-06-14 PROCEDURE — 3008F BODY MASS INDEX DOCD: CPT | Performed by: FAMILY MEDICINE

## 2019-06-17 ENCOUNTER — ANESTHESIA EVENT (OUTPATIENT)
Dept: PERIOP | Facility: HOSPITAL | Age: 39
DRG: 742 | End: 2019-06-17
Payer: COMMERCIAL

## 2019-06-17 PROCEDURE — NC001 PR NO CHARGE: Performed by: OBSTETRICS & GYNECOLOGY

## 2019-06-18 ENCOUNTER — ANESTHESIA (OUTPATIENT)
Dept: PERIOP | Facility: HOSPITAL | Age: 39
DRG: 742 | End: 2019-06-18
Payer: COMMERCIAL

## 2019-06-18 ENCOUNTER — HOSPITAL ENCOUNTER (INPATIENT)
Facility: HOSPITAL | Age: 39
LOS: 3 days | Discharge: HOME | DRG: 742 | End: 2019-06-21
Attending: OBSTETRICS & GYNECOLOGY | Admitting: OBSTETRICS & GYNECOLOGY
Payer: COMMERCIAL

## 2019-06-18 DIAGNOSIS — Z90.711 S/P ABDOMINAL SUPRACERVICAL SUBTOTAL HYSTERECTOMY: Primary | ICD-10-CM

## 2019-06-18 DIAGNOSIS — R10.2 PELVIC PAIN: ICD-10-CM

## 2019-06-18 DIAGNOSIS — N92.6 IRREGULAR MENSES: ICD-10-CM

## 2019-06-18 DIAGNOSIS — D21.9 FIBROID: ICD-10-CM

## 2019-06-18 LAB
EXT PREGNANCY TEST URINE: NEGATIVE
EXT. CONTROL: NORMAL

## 2019-06-18 PROCEDURE — 88307 TISSUE EXAM BY PATHOLOGIST: CPT | Performed by: PATHOLOGY

## 2019-06-18 PROCEDURE — 0UT70ZZ RESECTION OF BILATERAL FALLOPIAN TUBES, OPEN APPROACH: ICD-10-PCS | Performed by: OBSTETRICS & GYNECOLOGY

## 2019-06-18 PROCEDURE — 0UPD0HZ REMOVAL OF CONTRACEPTIVE DEVICE FROM UTERUS AND CERVIX, OPEN APPROACH: ICD-10-PCS | Performed by: OBSTETRICS & GYNECOLOGY

## 2019-06-18 PROCEDURE — 58180 PARTIAL HYSTERECTOMY: CPT | Performed by: OBSTETRICS & GYNECOLOGY

## 2019-06-18 PROCEDURE — 94762 N-INVAS EAR/PLS OXIMTRY CONT: CPT

## 2019-06-18 PROCEDURE — 0TJB8ZZ INSPECTION OF BLADDER, VIA NATURAL OR ARTIFICIAL OPENING ENDOSCOPIC: ICD-10-PCS | Performed by: OBSTETRICS & GYNECOLOGY

## 2019-06-18 PROCEDURE — 0UT90ZL RESECTION OF UTERUS, SUPRACERVICAL, OPEN APPROACH: ICD-10-PCS | Performed by: OBSTETRICS & GYNECOLOGY

## 2019-06-18 PROCEDURE — 81025 URINE PREGNANCY TEST: CPT | Performed by: ANESTHESIOLOGY

## 2019-06-18 RX ORDER — ONDANSETRON 2 MG/ML
4 INJECTION INTRAMUSCULAR; INTRAVENOUS EVERY 6 HOURS PRN
Status: DISCONTINUED | OUTPATIENT
Start: 2019-06-18 | End: 2019-06-21 | Stop reason: HOSPADM

## 2019-06-18 RX ORDER — SODIUM CHLORIDE 9 MG/ML
125 INJECTION, SOLUTION INTRAVENOUS CONTINUOUS
Status: DISCONTINUED | OUTPATIENT
Start: 2019-06-18 | End: 2019-06-18 | Stop reason: ALTCHOICE

## 2019-06-18 RX ORDER — ROCURONIUM BROMIDE 10 MG/ML
INJECTION, SOLUTION INTRAVENOUS AS NEEDED
Status: DISCONTINUED | OUTPATIENT
Start: 2019-06-18 | End: 2019-06-18 | Stop reason: SURG

## 2019-06-18 RX ORDER — MIDAZOLAM HYDROCHLORIDE 1 MG/ML
INJECTION INTRAMUSCULAR; INTRAVENOUS AS NEEDED
Status: DISCONTINUED | OUTPATIENT
Start: 2019-06-18 | End: 2019-06-18 | Stop reason: SURG

## 2019-06-18 RX ORDER — FENTANYL CITRATE 50 UG/ML
INJECTION, SOLUTION INTRAMUSCULAR; INTRAVENOUS AS NEEDED
Status: DISCONTINUED | OUTPATIENT
Start: 2019-06-18 | End: 2019-06-18 | Stop reason: SURG

## 2019-06-18 RX ORDER — LIDOCAINE HYDROCHLORIDE 20 MG/ML
INJECTION, SOLUTION EPIDURAL; INFILTRATION; INTRACAUDAL; PERINEURAL AS NEEDED
Status: DISCONTINUED | OUTPATIENT
Start: 2019-06-18 | End: 2019-06-18 | Stop reason: SURG

## 2019-06-18 RX ORDER — HYDROMORPHONE HYDROCHLORIDE 2 MG/ML
INJECTION, SOLUTION INTRAMUSCULAR; INTRAVENOUS; SUBCUTANEOUS AS NEEDED
Status: DISCONTINUED | OUTPATIENT
Start: 2019-06-18 | End: 2019-06-18 | Stop reason: SURG

## 2019-06-18 RX ORDER — FLUTICASONE PROPIONATE 50 MCG
1 SPRAY, SUSPENSION (ML) NASAL 2 TIMES DAILY PRN
Status: DISCONTINUED | OUTPATIENT
Start: 2019-06-18 | End: 2019-06-21 | Stop reason: HOSPADM

## 2019-06-18 RX ORDER — SODIUM CHLORIDE, SODIUM LACTATE, POTASSIUM CHLORIDE, CALCIUM CHLORIDE 600; 310; 30; 20 MG/100ML; MG/100ML; MG/100ML; MG/100ML
125 INJECTION, SOLUTION INTRAVENOUS CONTINUOUS
Status: DISCONTINUED | OUTPATIENT
Start: 2019-06-18 | End: 2019-06-19

## 2019-06-18 RX ORDER — PROPOFOL 10 MG/ML
INJECTION, EMULSION INTRAVENOUS AS NEEDED
Status: DISCONTINUED | OUTPATIENT
Start: 2019-06-18 | End: 2019-06-18 | Stop reason: SURG

## 2019-06-18 RX ORDER — ONDANSETRON 2 MG/ML
INJECTION INTRAMUSCULAR; INTRAVENOUS AS NEEDED
Status: DISCONTINUED | OUTPATIENT
Start: 2019-06-18 | End: 2019-06-18 | Stop reason: SURG

## 2019-06-18 RX ORDER — KETOROLAC TROMETHAMINE 30 MG/ML
15 INJECTION, SOLUTION INTRAMUSCULAR; INTRAVENOUS EVERY 6 HOURS SCHEDULED
Status: COMPLETED | OUTPATIENT
Start: 2019-06-18 | End: 2019-06-19

## 2019-06-18 RX ORDER — ESCITALOPRAM OXALATE 20 MG/1
20 TABLET ORAL
Status: DISCONTINUED | OUTPATIENT
Start: 2019-06-18 | End: 2019-06-21 | Stop reason: HOSPADM

## 2019-06-18 RX ORDER — MAGNESIUM HYDROXIDE 1200 MG/15ML
LIQUID ORAL AS NEEDED
Status: DISCONTINUED | OUTPATIENT
Start: 2019-06-18 | End: 2019-06-18 | Stop reason: HOSPADM

## 2019-06-18 RX ORDER — MEPERIDINE HYDROCHLORIDE 50 MG/ML
12.5 INJECTION INTRAMUSCULAR; INTRAVENOUS; SUBCUTANEOUS ONCE AS NEEDED
Status: DISCONTINUED | OUTPATIENT
Start: 2019-06-18 | End: 2019-06-18 | Stop reason: HOSPADM

## 2019-06-18 RX ORDER — LORATADINE 10 MG/1
10 TABLET ORAL DAILY
Status: DISCONTINUED | OUTPATIENT
Start: 2019-06-18 | End: 2019-06-21 | Stop reason: HOSPADM

## 2019-06-18 RX ORDER — DIPHENHYDRAMINE HYDROCHLORIDE 50 MG/ML
25 INJECTION INTRAMUSCULAR; INTRAVENOUS EVERY 6 HOURS PRN
Status: DISCONTINUED | OUTPATIENT
Start: 2019-06-18 | End: 2019-06-19

## 2019-06-18 RX ORDER — MONTELUKAST SODIUM 10 MG/1
10 TABLET ORAL
Status: DISCONTINUED | OUTPATIENT
Start: 2019-06-18 | End: 2019-06-21 | Stop reason: HOSPADM

## 2019-06-18 RX ORDER — ONDANSETRON 2 MG/ML
4 INJECTION INTRAMUSCULAR; INTRAVENOUS ONCE AS NEEDED
Status: DISCONTINUED | OUTPATIENT
Start: 2019-06-18 | End: 2019-06-18 | Stop reason: HOSPADM

## 2019-06-18 RX ORDER — HYDROMORPHONE HCL/PF 1 MG/ML
0.5 SYRINGE (ML) INJECTION
Status: DISCONTINUED | OUTPATIENT
Start: 2019-06-18 | End: 2019-06-18 | Stop reason: HOSPADM

## 2019-06-18 RX ORDER — FUROSEMIDE 10 MG/ML
INJECTION INTRAMUSCULAR; INTRAVENOUS AS NEEDED
Status: DISCONTINUED | OUTPATIENT
Start: 2019-06-18 | End: 2019-06-18 | Stop reason: SURG

## 2019-06-18 RX ORDER — LORAZEPAM 0.5 MG/1
0.5 TABLET ORAL DAILY PRN
Status: DISCONTINUED | OUTPATIENT
Start: 2019-06-18 | End: 2019-06-21 | Stop reason: HOSPADM

## 2019-06-18 RX ORDER — DEXAMETHASONE SODIUM PHOSPHATE 10 MG/ML
INJECTION, SOLUTION INTRAMUSCULAR; INTRAVENOUS AS NEEDED
Status: DISCONTINUED | OUTPATIENT
Start: 2019-06-18 | End: 2019-06-18 | Stop reason: SURG

## 2019-06-18 RX ORDER — NEOSTIGMINE METHYLSULFATE 1 MG/ML
INJECTION INTRAVENOUS AS NEEDED
Status: DISCONTINUED | OUTPATIENT
Start: 2019-06-18 | End: 2019-06-18 | Stop reason: SURG

## 2019-06-18 RX ORDER — FENTANYL CITRATE/PF 50 MCG/ML
50 SYRINGE (ML) INJECTION
Status: COMPLETED | OUTPATIENT
Start: 2019-06-18 | End: 2019-06-18

## 2019-06-18 RX ORDER — GLYCOPYRROLATE 0.2 MG/ML
INJECTION INTRAMUSCULAR; INTRAVENOUS AS NEEDED
Status: DISCONTINUED | OUTPATIENT
Start: 2019-06-18 | End: 2019-06-18 | Stop reason: SURG

## 2019-06-18 RX ADMIN — DEXAMETHASONE SODIUM PHOSPHATE 8 MG: 10 INJECTION, SOLUTION INTRAMUSCULAR; INTRAVENOUS at 10:31

## 2019-06-18 RX ADMIN — HYDROMORPHONE HYDROCHLORIDE 0.5 MG: 2 INJECTION, SOLUTION INTRAMUSCULAR; INTRAVENOUS; SUBCUTANEOUS at 11:21

## 2019-06-18 RX ADMIN — SODIUM CHLORIDE 125 ML/HR: 0.9 INJECTION, SOLUTION INTRAVENOUS at 09:34

## 2019-06-18 RX ADMIN — KETOROLAC TROMETHAMINE 15 MG: 30 INJECTION, SOLUTION INTRAMUSCULAR; INTRAVENOUS at 17:55

## 2019-06-18 RX ADMIN — MIDAZOLAM 2 MG: 1 INJECTION INTRAMUSCULAR; INTRAVENOUS at 10:05

## 2019-06-18 RX ADMIN — HYDROMORPHONE HYDROCHLORIDE 1 MG: 2 INJECTION, SOLUTION INTRAMUSCULAR; INTRAVENOUS; SUBCUTANEOUS at 14:21

## 2019-06-18 RX ADMIN — FENTANYL CITRATE 50 MCG: 50 INJECTION, SOLUTION INTRAMUSCULAR; INTRAVENOUS at 15:45

## 2019-06-18 RX ADMIN — FENTANYL CITRATE 50 MCG: 50 INJECTION, SOLUTION INTRAMUSCULAR; INTRAVENOUS at 14:57

## 2019-06-18 RX ADMIN — SODIUM CHLORIDE: 0.9 INJECTION, SOLUTION INTRAVENOUS at 11:52

## 2019-06-18 RX ADMIN — GLYCOPYRROLATE 0.4 MG: 0.2 INJECTION INTRAMUSCULAR; INTRAVENOUS at 13:38

## 2019-06-18 RX ADMIN — ROCURONIUM BROMIDE 20 MG: 10 INJECTION, SOLUTION INTRAVENOUS at 11:07

## 2019-06-18 RX ADMIN — SODIUM CHLORIDE: 0.9 INJECTION, SOLUTION INTRAVENOUS at 11:05

## 2019-06-18 RX ADMIN — LIDOCAINE HYDROCHLORIDE 100 MG: 20 INJECTION, SOLUTION EPIDURAL; INFILTRATION; INTRACAUDAL; PERINEURAL at 10:09

## 2019-06-18 RX ADMIN — KETOROLAC TROMETHAMINE 15 MG: 30 INJECTION, SOLUTION INTRAMUSCULAR; INTRAVENOUS at 23:00

## 2019-06-18 RX ADMIN — SODIUM CHLORIDE, SODIUM LACTATE, POTASSIUM CHLORIDE, AND CALCIUM CHLORIDE 125 ML/HR: .6; .31; .03; .02 INJECTION, SOLUTION INTRAVENOUS at 15:51

## 2019-06-18 RX ADMIN — FUROSEMIDE 10 MG: 10 INJECTION, SOLUTION INTRAMUSCULAR; INTRAVENOUS at 13:45

## 2019-06-18 RX ADMIN — MONTELUKAST SODIUM 10 MG: 10 TABLET, COATED ORAL at 22:48

## 2019-06-18 RX ADMIN — ESCITALOPRAM OXALATE 20 MG: 20 TABLET ORAL at 22:48

## 2019-06-18 RX ADMIN — FENTANYL CITRATE 100 MCG: 50 INJECTION, SOLUTION INTRAMUSCULAR; INTRAVENOUS at 10:09

## 2019-06-18 RX ADMIN — Medication 3000 MG: at 10:05

## 2019-06-18 RX ADMIN — HYDROMORPHONE HYDROCHLORIDE 0.5 MG: 2 INJECTION, SOLUTION INTRAMUSCULAR; INTRAVENOUS; SUBCUTANEOUS at 11:33

## 2019-06-18 RX ADMIN — ONDANSETRON HYDROCHLORIDE 4 MG: 2 INJECTION, SOLUTION INTRAVENOUS at 13:33

## 2019-06-18 RX ADMIN — ROCURONIUM BROMIDE 20 MG: 10 INJECTION, SOLUTION INTRAVENOUS at 11:32

## 2019-06-18 RX ADMIN — SODIUM CHLORIDE, SODIUM LACTATE, POTASSIUM CHLORIDE, AND CALCIUM CHLORIDE 125 ML/HR: .6; .31; .03; .02 INJECTION, SOLUTION INTRAVENOUS at 23:59

## 2019-06-18 RX ADMIN — HYDROMORPHONE HYDROCHLORIDE: 10 INJECTION INTRAMUSCULAR; INTRAVENOUS; SUBCUTANEOUS at 15:52

## 2019-06-18 RX ADMIN — ROCURONIUM BROMIDE 50 MG: 10 INJECTION, SOLUTION INTRAVENOUS at 10:09

## 2019-06-18 RX ADMIN — Medication 3000 MG: at 14:05

## 2019-06-18 RX ADMIN — HYDROMORPHONE HYDROCHLORIDE 1 MG: 2 INJECTION, SOLUTION INTRAMUSCULAR; INTRAVENOUS; SUBCUTANEOUS at 12:17

## 2019-06-18 RX ADMIN — PROPOFOL 200 MG: 10 INJECTION, EMULSION INTRAVENOUS at 10:09

## 2019-06-18 RX ADMIN — FENTANYL CITRATE 50 MCG: 50 INJECTION, SOLUTION INTRAMUSCULAR; INTRAVENOUS at 15:20

## 2019-06-18 RX ADMIN — NEOSTIGMINE METHYLSULFATE 3 MG: 1 INJECTION, SOLUTION INTRAVENOUS at 13:38

## 2019-06-18 RX ADMIN — HYDROMORPHONE HYDROCHLORIDE 0.5 MG: 1 INJECTION, SOLUTION INTRAMUSCULAR; INTRAVENOUS; SUBCUTANEOUS at 16:08

## 2019-06-18 RX ADMIN — SODIUM CHLORIDE: 0.9 INJECTION, SOLUTION INTRAVENOUS at 13:57

## 2019-06-18 RX ADMIN — FENTANYL CITRATE 100 MCG: 50 INJECTION, SOLUTION INTRAMUSCULAR; INTRAVENOUS at 10:27

## 2019-06-18 RX ADMIN — FENTANYL CITRATE 50 MCG: 50 INJECTION, SOLUTION INTRAMUSCULAR; INTRAVENOUS at 15:10

## 2019-06-19 LAB
BASOPHILS # BLD AUTO: 0.02 THOUSANDS/ΜL (ref 0–0.1)
BASOPHILS NFR BLD AUTO: 0 % (ref 0–1)
EOSINOPHIL # BLD AUTO: 0 THOUSAND/ΜL (ref 0–0.61)
EOSINOPHIL NFR BLD AUTO: 0 % (ref 0–6)
ERYTHROCYTE [DISTWIDTH] IN BLOOD BY AUTOMATED COUNT: 14.5 % (ref 11.6–15.1)
HCT VFR BLD AUTO: 33.8 % (ref 34.8–46.1)
HGB BLD-MCNC: 10.2 G/DL (ref 11.5–15.4)
IMM GRANULOCYTES # BLD AUTO: 0.07 THOUSAND/UL (ref 0–0.2)
IMM GRANULOCYTES NFR BLD AUTO: 1 % (ref 0–2)
LYMPHOCYTES # BLD AUTO: 1.49 THOUSANDS/ΜL (ref 0.6–4.47)
LYMPHOCYTES NFR BLD AUTO: 10 % (ref 14–44)
MCH RBC QN AUTO: 27.6 PG (ref 26.8–34.3)
MCHC RBC AUTO-ENTMCNC: 30.2 G/DL (ref 31.4–37.4)
MCV RBC AUTO: 91 FL (ref 82–98)
MONOCYTES # BLD AUTO: 1.36 THOUSAND/ΜL (ref 0.17–1.22)
MONOCYTES NFR BLD AUTO: 9 % (ref 4–12)
NEUTROPHILS # BLD AUTO: 11.7 THOUSANDS/ΜL (ref 1.85–7.62)
NEUTS SEG NFR BLD AUTO: 80 % (ref 43–75)
NRBC BLD AUTO-RTO: 0 /100 WBCS
PLATELET # BLD AUTO: 226 THOUSANDS/UL (ref 149–390)
PMV BLD AUTO: 9.4 FL (ref 8.9–12.7)
RBC # BLD AUTO: 3.7 MILLION/UL (ref 3.81–5.12)
WBC # BLD AUTO: 14.64 THOUSAND/UL (ref 4.31–10.16)

## 2019-06-19 PROCEDURE — 85025 COMPLETE CBC W/AUTO DIFF WBC: CPT | Performed by: OBSTETRICS & GYNECOLOGY

## 2019-06-19 PROCEDURE — 94762 N-INVAS EAR/PLS OXIMTRY CONT: CPT

## 2019-06-19 PROCEDURE — 99024 POSTOP FOLLOW-UP VISIT: CPT | Performed by: OBSTETRICS & GYNECOLOGY

## 2019-06-19 RX ORDER — ACETAMINOPHEN 325 MG/1
650 TABLET ORAL EVERY 6 HOURS SCHEDULED
Status: DISCONTINUED | OUTPATIENT
Start: 2019-06-19 | End: 2019-06-21 | Stop reason: HOSPADM

## 2019-06-19 RX ORDER — OXYCODONE HYDROCHLORIDE 10 MG/1
10 TABLET ORAL EVERY 4 HOURS PRN
Status: DISCONTINUED | OUTPATIENT
Start: 2019-06-19 | End: 2019-06-21 | Stop reason: HOSPADM

## 2019-06-19 RX ORDER — HYDROMORPHONE HCL/PF 1 MG/ML
0.5 SYRINGE (ML) INJECTION
Status: DISCONTINUED | OUTPATIENT
Start: 2019-06-19 | End: 2019-06-21 | Stop reason: HOSPADM

## 2019-06-19 RX ORDER — IBUPROFEN 600 MG/1
600 TABLET ORAL EVERY 6 HOURS PRN
Status: DISCONTINUED | OUTPATIENT
Start: 2019-06-19 | End: 2019-06-21 | Stop reason: HOSPADM

## 2019-06-19 RX ORDER — OXYCODONE HYDROCHLORIDE 5 MG/1
5 TABLET ORAL EVERY 4 HOURS PRN
Status: DISCONTINUED | OUTPATIENT
Start: 2019-06-19 | End: 2019-06-21 | Stop reason: HOSPADM

## 2019-06-19 RX ADMIN — ENOXAPARIN SODIUM 40 MG: 40 INJECTION SUBCUTANEOUS at 08:31

## 2019-06-19 RX ADMIN — MONTELUKAST SODIUM 10 MG: 10 TABLET, COATED ORAL at 23:09

## 2019-06-19 RX ADMIN — OXYCODONE HYDROCHLORIDE 10 MG: 10 TABLET ORAL at 13:17

## 2019-06-19 RX ADMIN — OXYCODONE HYDROCHLORIDE 10 MG: 10 TABLET ORAL at 08:31

## 2019-06-19 RX ADMIN — ACETAMINOPHEN 650 MG: 325 TABLET ORAL at 17:27

## 2019-06-19 RX ADMIN — ESCITALOPRAM OXALATE 20 MG: 20 TABLET ORAL at 23:09

## 2019-06-19 RX ADMIN — ACETAMINOPHEN 650 MG: 325 TABLET ORAL at 23:09

## 2019-06-19 RX ADMIN — ACETAMINOPHEN 650 MG: 325 TABLET ORAL at 13:19

## 2019-06-19 RX ADMIN — KETOROLAC TROMETHAMINE 15 MG: 30 INJECTION, SOLUTION INTRAMUSCULAR; INTRAVENOUS at 06:09

## 2019-06-19 RX ADMIN — IBUPROFEN 600 MG: 600 TABLET ORAL at 19:53

## 2019-06-19 RX ADMIN — LORATADINE 10 MG: 10 TABLET ORAL at 08:31

## 2019-06-20 ENCOUNTER — APPOINTMENT (INPATIENT)
Dept: RADIOLOGY | Facility: HOSPITAL | Age: 39
DRG: 742 | End: 2019-06-20
Payer: COMMERCIAL

## 2019-06-20 LAB
ALBUMIN SERPL BCP-MCNC: 3 G/DL (ref 3.5–5)
ALP SERPL-CCNC: 77 U/L (ref 46–116)
ALT SERPL W P-5'-P-CCNC: 23 U/L (ref 12–78)
ANION GAP SERPL CALCULATED.3IONS-SCNC: 10 MMOL/L (ref 4–13)
AST SERPL W P-5'-P-CCNC: 17 U/L (ref 5–45)
BASOPHILS # BLD AUTO: 0.03 THOUSANDS/ΜL (ref 0–0.1)
BASOPHILS NFR BLD AUTO: 0 % (ref 0–1)
BILIRUB SERPL-MCNC: 0.19 MG/DL (ref 0.2–1)
BILIRUB UR QL STRIP: NEGATIVE
BUN SERPL-MCNC: 5 MG/DL (ref 5–25)
CALCIUM SERPL-MCNC: 8.8 MG/DL (ref 8.3–10.1)
CHLORIDE SERPL-SCNC: 104 MMOL/L (ref 100–108)
CLARITY UR: CLEAR
CO2 SERPL-SCNC: 27 MMOL/L (ref 21–32)
COLOR UR: YELLOW
CREAT SERPL-MCNC: 0.63 MG/DL (ref 0.6–1.3)
EOSINOPHIL # BLD AUTO: 0.01 THOUSAND/ΜL (ref 0–0.61)
EOSINOPHIL NFR BLD AUTO: 0 % (ref 0–6)
ERYTHROCYTE [DISTWIDTH] IN BLOOD BY AUTOMATED COUNT: 14.6 % (ref 11.6–15.1)
GFR SERPL CREATININE-BSD FRML MDRD: 113 ML/MIN/1.73SQ M
GLUCOSE SERPL-MCNC: 105 MG/DL (ref 65–140)
GLUCOSE UR STRIP-MCNC: NEGATIVE MG/DL
HCT VFR BLD AUTO: 34.5 % (ref 34.8–46.1)
HGB BLD-MCNC: 10.7 G/DL (ref 11.5–15.4)
HGB UR QL STRIP.AUTO: NEGATIVE
IMM GRANULOCYTES # BLD AUTO: 0.05 THOUSAND/UL (ref 0–0.2)
IMM GRANULOCYTES NFR BLD AUTO: 0 % (ref 0–2)
KETONES UR STRIP-MCNC: NEGATIVE MG/DL
LACTATE SERPL-SCNC: 0.9 MMOL/L (ref 0.5–2)
LEUKOCYTE ESTERASE UR QL STRIP: NEGATIVE
LYMPHOCYTES # BLD AUTO: 1.81 THOUSANDS/ΜL (ref 0.6–4.47)
LYMPHOCYTES NFR BLD AUTO: 16 % (ref 14–44)
MCH RBC QN AUTO: 28 PG (ref 26.8–34.3)
MCHC RBC AUTO-ENTMCNC: 31 G/DL (ref 31.4–37.4)
MCV RBC AUTO: 90 FL (ref 82–98)
MONOCYTES # BLD AUTO: 0.79 THOUSAND/ΜL (ref 0.17–1.22)
MONOCYTES NFR BLD AUTO: 7 % (ref 4–12)
NEUTROPHILS # BLD AUTO: 8.55 THOUSANDS/ΜL (ref 1.85–7.62)
NEUTS SEG NFR BLD AUTO: 77 % (ref 43–75)
NITRITE UR QL STRIP: NEGATIVE
NRBC BLD AUTO-RTO: 0 /100 WBCS
PH UR STRIP.AUTO: 6.5 [PH]
PLATELET # BLD AUTO: 281 THOUSANDS/UL (ref 149–390)
PMV BLD AUTO: 9.3 FL (ref 8.9–12.7)
POTASSIUM SERPL-SCNC: 3.2 MMOL/L (ref 3.5–5.3)
PROCALCITONIN SERPL-MCNC: 0.19 NG/ML
PROT SERPL-MCNC: 7.3 G/DL (ref 6.4–8.2)
PROT UR STRIP-MCNC: NEGATIVE MG/DL
RBC # BLD AUTO: 3.82 MILLION/UL (ref 3.81–5.12)
SODIUM SERPL-SCNC: 141 MMOL/L (ref 136–145)
SP GR UR STRIP.AUTO: <=1.005 (ref 1–1.03)
UROBILINOGEN UR QL STRIP.AUTO: 0.2 E.U./DL
WBC # BLD AUTO: 11.24 THOUSAND/UL (ref 4.31–10.16)

## 2019-06-20 PROCEDURE — 87040 BLOOD CULTURE FOR BACTERIA: CPT | Performed by: OBSTETRICS & GYNECOLOGY

## 2019-06-20 PROCEDURE — 83605 ASSAY OF LACTIC ACID: CPT | Performed by: OBSTETRICS & GYNECOLOGY

## 2019-06-20 PROCEDURE — 85025 COMPLETE CBC W/AUTO DIFF WBC: CPT | Performed by: OBSTETRICS & GYNECOLOGY

## 2019-06-20 PROCEDURE — 71046 X-RAY EXAM CHEST 2 VIEWS: CPT

## 2019-06-20 PROCEDURE — 81003 URINALYSIS AUTO W/O SCOPE: CPT | Performed by: OBSTETRICS & GYNECOLOGY

## 2019-06-20 PROCEDURE — 80053 COMPREHEN METABOLIC PANEL: CPT | Performed by: OBSTETRICS & GYNECOLOGY

## 2019-06-20 PROCEDURE — 84145 PROCALCITONIN (PCT): CPT | Performed by: OBSTETRICS & GYNECOLOGY

## 2019-06-20 PROCEDURE — 99024 POSTOP FOLLOW-UP VISIT: CPT | Performed by: OBSTETRICS & GYNECOLOGY

## 2019-06-20 RX ORDER — IBUPROFEN 600 MG/1
600 TABLET ORAL EVERY 6 HOURS PRN
Qty: 20 TABLET | Refills: 0 | Status: SHIPPED | OUTPATIENT
Start: 2019-06-20 | End: 2020-07-17 | Stop reason: ALTCHOICE

## 2019-06-20 RX ORDER — SODIUM CHLORIDE, SODIUM LACTATE, POTASSIUM CHLORIDE, CALCIUM CHLORIDE 600; 310; 30; 20 MG/100ML; MG/100ML; MG/100ML; MG/100ML
125 INJECTION, SOLUTION INTRAVENOUS CONTINUOUS
Status: DISCONTINUED | OUTPATIENT
Start: 2019-06-20 | End: 2019-06-21 | Stop reason: HOSPADM

## 2019-06-20 RX ORDER — OXYCODONE HYDROCHLORIDE 5 MG/1
5 TABLET ORAL EVERY 6 HOURS PRN
Qty: 12 TABLET | Refills: 0 | Status: SHIPPED | OUTPATIENT
Start: 2019-06-20 | End: 2019-06-23

## 2019-06-20 RX ORDER — ACETAMINOPHEN 325 MG/1
650 TABLET ORAL EVERY 6 HOURS SCHEDULED
Qty: 30 TABLET | Refills: 0 | Status: SHIPPED | OUTPATIENT
Start: 2019-06-20 | End: 2020-07-17 | Stop reason: ALTCHOICE

## 2019-06-20 RX ORDER — DOCUSATE SODIUM 100 MG/1
100 CAPSULE, LIQUID FILLED ORAL 2 TIMES DAILY
Qty: 8 CAPSULE | Refills: 0 | Status: SHIPPED | OUTPATIENT
Start: 2019-06-20 | End: 2020-03-17 | Stop reason: ALTCHOICE

## 2019-06-20 RX ORDER — DOCUSATE SODIUM 100 MG/1
100 CAPSULE, LIQUID FILLED ORAL 2 TIMES DAILY
Status: DISCONTINUED | OUTPATIENT
Start: 2019-06-20 | End: 2019-06-21 | Stop reason: HOSPADM

## 2019-06-20 RX ADMIN — MONTELUKAST SODIUM 10 MG: 10 TABLET, COATED ORAL at 21:19

## 2019-06-20 RX ADMIN — IBUPROFEN 600 MG: 600 TABLET ORAL at 21:19

## 2019-06-20 RX ADMIN — ACETAMINOPHEN 650 MG: 325 TABLET ORAL at 17:34

## 2019-06-20 RX ADMIN — OXYCODONE HYDROCHLORIDE 5 MG: 5 TABLET ORAL at 06:20

## 2019-06-20 RX ADMIN — SODIUM CHLORIDE, SODIUM LACTATE, POTASSIUM CHLORIDE, AND CALCIUM CHLORIDE 125 ML/HR: .6; .31; .03; .02 INJECTION, SOLUTION INTRAVENOUS at 16:23

## 2019-06-20 RX ADMIN — ACETAMINOPHEN 650 MG: 325 TABLET ORAL at 23:20

## 2019-06-20 RX ADMIN — DOCUSATE SODIUM 100 MG: 100 CAPSULE, LIQUID FILLED ORAL at 18:02

## 2019-06-20 RX ADMIN — LORAZEPAM 0.5 MG: 0.5 TABLET ORAL at 21:24

## 2019-06-20 RX ADMIN — ACETAMINOPHEN 650 MG: 325 TABLET ORAL at 11:29

## 2019-06-20 RX ADMIN — LORATADINE 10 MG: 10 TABLET ORAL at 08:29

## 2019-06-20 RX ADMIN — ESCITALOPRAM OXALATE 20 MG: 20 TABLET ORAL at 21:19

## 2019-06-20 RX ADMIN — IBUPROFEN 600 MG: 600 TABLET ORAL at 08:28

## 2019-06-20 RX ADMIN — ACETAMINOPHEN 650 MG: 325 TABLET ORAL at 05:14

## 2019-06-20 RX ADMIN — ENOXAPARIN SODIUM 40 MG: 40 INJECTION SUBCUTANEOUS at 08:29

## 2019-06-21 VITALS
OXYGEN SATURATION: 98 % | BODY MASS INDEX: 44.41 KG/M2 | TEMPERATURE: 98.3 F | HEIGHT: 68 IN | WEIGHT: 293 LBS | DIASTOLIC BLOOD PRESSURE: 84 MMHG | RESPIRATION RATE: 17 BRPM | HEART RATE: 79 BPM | SYSTOLIC BLOOD PRESSURE: 152 MMHG

## 2019-06-21 LAB
BASOPHILS # BLD AUTO: 0.03 THOUSANDS/ΜL (ref 0–0.1)
BASOPHILS NFR BLD AUTO: 0 % (ref 0–1)
EOSINOPHIL # BLD AUTO: 0 THOUSAND/ΜL (ref 0–0.61)
EOSINOPHIL NFR BLD AUTO: 0 % (ref 0–6)
ERYTHROCYTE [DISTWIDTH] IN BLOOD BY AUTOMATED COUNT: 14.6 % (ref 11.6–15.1)
HCT VFR BLD AUTO: 30.7 % (ref 34.8–46.1)
HGB BLD-MCNC: 9.5 G/DL (ref 11.5–15.4)
IMM GRANULOCYTES # BLD AUTO: 0.03 THOUSAND/UL (ref 0–0.2)
IMM GRANULOCYTES NFR BLD AUTO: 0 % (ref 0–2)
LYMPHOCYTES # BLD AUTO: 2.12 THOUSANDS/ΜL (ref 0.6–4.47)
LYMPHOCYTES NFR BLD AUTO: 24 % (ref 14–44)
MCH RBC QN AUTO: 28.1 PG (ref 26.8–34.3)
MCHC RBC AUTO-ENTMCNC: 30.9 G/DL (ref 31.4–37.4)
MCV RBC AUTO: 91 FL (ref 82–98)
MONOCYTES # BLD AUTO: 0.65 THOUSAND/ΜL (ref 0.17–1.22)
MONOCYTES NFR BLD AUTO: 7 % (ref 4–12)
NEUTROPHILS # BLD AUTO: 6.07 THOUSANDS/ΜL (ref 1.85–7.62)
NEUTS SEG NFR BLD AUTO: 69 % (ref 43–75)
NRBC BLD AUTO-RTO: 0 /100 WBCS
PLATELET # BLD AUTO: 245 THOUSANDS/UL (ref 149–390)
PMV BLD AUTO: 9.7 FL (ref 8.9–12.7)
RBC # BLD AUTO: 3.38 MILLION/UL (ref 3.81–5.12)
WBC # BLD AUTO: 8.9 THOUSAND/UL (ref 4.31–10.16)

## 2019-06-21 PROCEDURE — 85025 COMPLETE CBC W/AUTO DIFF WBC: CPT | Performed by: OBSTETRICS & GYNECOLOGY

## 2019-06-21 PROCEDURE — 99024 POSTOP FOLLOW-UP VISIT: CPT | Performed by: OBSTETRICS & GYNECOLOGY

## 2019-06-21 RX ORDER — DOCUSATE SODIUM 100 MG/1
100 CAPSULE, LIQUID FILLED ORAL 2 TIMES DAILY
Qty: 10 CAPSULE | Refills: 0 | Status: SHIPPED | OUTPATIENT
Start: 2019-06-21 | End: 2020-03-17 | Stop reason: ALTCHOICE

## 2019-06-21 RX ADMIN — ENOXAPARIN SODIUM 40 MG: 40 INJECTION SUBCUTANEOUS at 08:08

## 2019-06-21 RX ADMIN — SODIUM CHLORIDE, SODIUM LACTATE, POTASSIUM CHLORIDE, AND CALCIUM CHLORIDE 125 ML/HR: .6; .31; .03; .02 INJECTION, SOLUTION INTRAVENOUS at 02:10

## 2019-06-21 RX ADMIN — SODIUM CHLORIDE, SODIUM LACTATE, POTASSIUM CHLORIDE, AND CALCIUM CHLORIDE 125 ML/HR: .6; .31; .03; .02 INJECTION, SOLUTION INTRAVENOUS at 10:01

## 2019-06-21 RX ADMIN — IBUPROFEN 600 MG: 600 TABLET ORAL at 08:08

## 2019-06-21 RX ADMIN — DOCUSATE SODIUM 100 MG: 100 CAPSULE, LIQUID FILLED ORAL at 08:08

## 2019-06-21 RX ADMIN — LORATADINE 10 MG: 10 TABLET ORAL at 08:08

## 2019-06-21 RX ADMIN — ACETAMINOPHEN 650 MG: 325 TABLET ORAL at 12:21

## 2019-06-21 RX ADMIN — ACETAMINOPHEN 650 MG: 325 TABLET ORAL at 05:40

## 2019-06-25 LAB
BACTERIA BLD CULT: NORMAL
BACTERIA BLD CULT: NORMAL

## 2019-07-15 ENCOUNTER — OFFICE VISIT (OUTPATIENT)
Dept: OBGYN CLINIC | Facility: CLINIC | Age: 39
End: 2019-07-15

## 2019-07-15 VITALS — SYSTOLIC BLOOD PRESSURE: 138 MMHG | BODY MASS INDEX: 49.08 KG/M2 | DIASTOLIC BLOOD PRESSURE: 84 MMHG | WEIGHT: 293 LBS

## 2019-07-15 DIAGNOSIS — D21.9 FIBROID: ICD-10-CM

## 2019-07-15 DIAGNOSIS — D64.9 ANEMIA, UNSPECIFIED TYPE: Primary | ICD-10-CM

## 2019-07-15 DIAGNOSIS — Z90.711 S/P ABDOMINAL SUPRACERVICAL SUBTOTAL HYSTERECTOMY: ICD-10-CM

## 2019-07-15 PROBLEM — R10.2 PELVIC PAIN: Status: RESOLVED | Noted: 2019-04-02 | Resolved: 2019-07-15

## 2019-07-15 PROBLEM — N85.2 UTERINE ENLARGEMENT: Status: RESOLVED | Noted: 2017-12-27 | Resolved: 2019-07-15

## 2019-07-15 PROBLEM — Z97.5 IUD CONTRACEPTION: Status: RESOLVED | Noted: 2019-03-11 | Resolved: 2019-07-15

## 2019-07-15 PROBLEM — D25.9 UTERINE FIBROID: Status: RESOLVED | Noted: 2019-03-11 | Resolved: 2019-07-15

## 2019-07-15 PROBLEM — N92.6 IRREGULAR MENSES: Status: RESOLVED | Noted: 2018-01-17 | Resolved: 2019-07-15

## 2019-07-15 PROCEDURE — 99024 POSTOP FOLLOW-UP VISIT: CPT | Performed by: OBSTETRICS & GYNECOLOGY

## 2019-07-15 NOTE — PATIENT INSTRUCTIONS
Anemia   WHAT YOU NEED TO KNOW:   What is anemia? Anemia is a low number of red blood cells or a low amount of hemoglobin in your red blood cells  Hemoglobin is a protein that helps carry oxygen throughout your body  Red blood cells use iron to create hemoglobin  Anemia may develop if your body does not have enough iron  It may also develop if your body does not make enough red blood cells or they die faster than your body can make them  What increases my risk for anemia? · Trauma or surgery that causes massive blood loss    · A gastrointestinal bleed    · A woman's monthly period    · A family history of blood disease or anemia    · Liver or kidney disease, cancer, rheumatoid arthritis, or hyperthyroidism    · Alcohol abuse    · Lack of foods that contain iron, folic acid, or vitamin B12  What are the signs and symptoms of anemia? · Chest pain or a fast heartbeat    · Lightheadedness, dizziness, or shortness of breath    · Cold or pale skin    · Tiredness, weakness, or confusion  How is anemia diagnosed? Blood tests will show if you have anemia  How is anemia treated? Treatment depends on the type of anemia you have  You may need any of the following:  · Iron or folic acid supplements  help increase your red blood cell and hemoglobin levels  · Vitamin B12 injections  may help boost your red blood cell count and decrease your symptoms  · A blood transfusion  may be needed if your body cannot replace the blood you have lost     · Surgery  may be needed to stop bleeding, or if your anemia is severe  How can I prevent anemia? Eat healthy foods rich in iron and vitamin C  Nuts, meat, dark leafy green vegetables, and beans are high in iron and protein  Vitamin C helps your body absorb iron  Foods rich in vitamin C include oranges and other citrus fruits  Ask your healthcare provider for a list of other foods that are high in iron or vitamin C  Ask if you need to be on a special diet     Call 911 or have someone call 911 for any of the following:   · You lose consciousness  · You have severe chest pain  When should I seek immediate care? · You have dark or bloody bowel movements  When should I contact my healthcare provider? · Your symptoms are worse, even after treatment  · You have questions or concerns about your condition or care  CARE AGREEMENT:   You have the right to help plan your care  Learn about your health condition and how it may be treated  Discuss treatment options with your caregivers to decide what care you want to receive  You always have the right to refuse treatment  The above information is an  only  It is not intended as medical advice for individual conditions or treatments  Talk to your doctor, nurse or pharmacist before following any medical regimen to see if it is safe and effective for you  © 2017 [address] Information is for End User's use only and may not be sold, redistributed or otherwise used for commercial purposes  All illustrations and images included in CareNotes® are the copyrighted property of A [employer] Inc  or [employer]

## 2019-07-15 NOTE — PROGRESS NOTES
Assessment/Plan   Diagnoses and all orders for this visit:    Anemia, unspecified type  -     CBC; Future    S/P abdominal supracervical subtotal hysterectomy      1  Status post laparotomy with supracervical hysterectomy - patient is doing well  Pathology demonstrated 761 g uterus with fibroids with no other significant pathology  Her incision is clean, dry, and intact  Activity instructions were reviewed  She can increase activity but was encouraged to avoid any heavy lifting, abdominal exercises, or any pelvic activity until 6 weeks post surgery  She will call or return with any issues  She was counseled that there is a possibility of vaginal bleeding secondary to supracervical surgery, which was done secondary to anatomical narrowing of the pelvis at the time of surgery  She will call with any such issues  2  Mild anemia- most recent hemoglobin was 9 5 on discharge 6/21/19  She is taking iron once daily along with stool softener  Request for CBC was given and she will get this done in the next 2 weeks or so  3  History of Mirena IUD / pelvic cramping/ left adnexal tenderness /irregular bleeding- all resolved after surgery  She will follow-up March 2020 for yearly exam or as needed  Subjective   Patient ID: Vi Irwin is a 37 y o  female  Vitals:    07/15/19 1352   BP: 138/84      Patient was seen today for postop check  Please see assessment plan for details        The following portions of the patient's history were reviewed and updated as appropriate: allergies, current medications, past family history, past medical history, past social history, past surgical history and problem list   Past Medical History:   Diagnosis Date    Allergic rhinitis     Anxiety     Depression     Family health problem     mother and grandmother h/o blood clots after surgery    Fibroid     Fullness in ear, left     Resolved [id number]    Hypertension     Resolved [id number]    Urinary tract infection h/o     Past Surgical History:   Procedure Laterality Date    CHOLECYSTECTOMY      PA TOTAL ABDOM HYSTERECTOMY N/A 2019    Procedure: Supracervical IWONA, removal of bilat tubes, removal of IUD, cystoscopy;  Surgeon: Kaykay Barrera MD;  Location: [facility];  Service: Gynecology    TOOTH EXTRACTION       OB History    Para Term  AB Living   0 0 0 0 0 0   SAB TAB Ectopic Multiple Live Births   0 0 0 0 0       Current Outpatient Medications:     acetaminophen (TYLENOL) 325 mg tablet, Take 2 tablets (650 mg total) by mouth every 6 (six) hours, Disp: 30 tablet, Rfl: 0    Azelastine HCl 137 MCG/SPRAY SOLN, 1-2 puffs each nostril twice a day when necessary nasal congestion, Disp: 1 Bottle, Rfl: 5    B-D ALLERGY SYRINGE 1CC/28G 28G X 1/2" 1 ML MISC, , Disp: , Rfl:     Cetirizine HCl (ZYRTEC ALLERGY) 10 MG CAPS, Take 1 capsule by mouth daily as needed , Disp: , Rfl:     docusate sodium (COLACE) 100 mg capsule, Take 1 capsule (100 mg total) by mouth 2 (two) times a day, Disp: 10 capsule, Rfl: 0    EPINEPHrine (EPIPEN) 0 3 mg/0 3 mL SOAJ, as needed , Disp: , Rfl:     escitalopram (LEXAPRO) 20 mg tablet, Take 1 tablet (20 mg total) by mouth daily (Patient taking differently: Take 20 mg by mouth daily at bedtime ), Disp: 90 tablet, Rfl: 1    fluticasone (FLONASE) 50 mcg/act nasal spray, 1 spray into each nostril 2 (two) times a day (Patient taking differently: 1 spray into each nostril 2 (two) times a day as needed ), Disp: 1 Bottle, Rfl: 11    ibuprofen (MOTRIN) 600 mg tablet, Take 1 tablet (600 mg total) by mouth every 6 (six) hours as needed for mild pain, Disp: 20 tablet, Rfl: 0    LORazepam (ATIVAN) 0 5 mg tablet, Take 1 tablet (0 5 mg total) by mouth daily as needed for anxiety, Disp: 30 tablet, Rfl: 0    montelukast (SINGULAIR) 10 mg tablet, Take 1 tablet (10 mg total) by mouth daily at bedtime, Disp: 30 tablet, Rfl: 11    docusate sodium (COLACE) 100 mg capsule, Take 1 capsule (100 mg total) by mouth 2 (two) times a day for 4 days, Disp: 8 capsule, Rfl: 0  No Known Allergies  Social History     Socioeconomic History    Marital status: /Civil Union     Spouse name: None    Number of children: None    Years of education: None    Highest education level: None   Occupational History    None   Social Needs    Financial resource strain: None    Food insecurity:     Worry: None     Inability: None    Transportation needs:     Medical: None     Non-medical: None   Tobacco Use    Smoking status: Never Smoker    Smokeless tobacco: Never Used   Substance and Sexual Activity    Alcohol use: Yes     Frequency: 2-4 times a month     Drinks per session: 1 or 2     Binge frequency: Never     Comment: occasional    Drug use: No    Sexual activity: Yes     Partners: Male     Birth control/protection: Female Sterilization   Lifestyle    Physical activity:     Days per week: None     Minutes per session: None    Stress: None   Relationships    Social connections:     Talks on phone: None     Gets together: None     Attends Sabianism service: None     Active member of club or organization: None     Attends meetings of clubs or organizations: None     Relationship status: None    Intimate partner violence:     Fear of current or ex partner: None     Emotionally abused: None     Physically abused: None     Forced sexual activity: None   Other Topics Concern    None   Social History Narrative    Always uses seat belt    Caffeine use    [employer] [facility]     Family History   Problem Relation Age of Onset    Anxiety disorder Mother     Other Mother         Blood clots    Hypertension Mother     Alcohol abuse Father     Hypertension Brother     Anxiety disorder Maternal Grandmother     Other Maternal Grandmother         Blood clots    Lung cancer Maternal Grandfather     Heart disease Family         cardiac disorder    Cancer Family         lung ca    Diabetes Family  Cancer Family     Ovarian cancer Maternal Aunt     Alcohol abuse Maternal Aunt 72    Prostate cancer Maternal Uncle        Review of Systems    Objective   Physical Exam    Objective      /84 (BP Location: Right arm, Patient Position: Sitting, Cuff Size: Standard)   Wt (!) 146 kg (322 lb 12 8 oz)   LMP 05/15/2019 (Approximate)   BMI 49 08 kg/m²     General:   alert and oriented, in no acute distress   Neck:    Breast:    Heart:    Lungs:    Abdomen: soft, non-tender, without masses or organomegaly   Incision is clean, dry, and intact   Vulva: normal   Vagina: Without erythema or lesions or discharge  Normal   Cervix: Without lesions or discharge or cervicitis    No Cervical motion tenderness   Uterus: surgically absent   Adnexa: no mass, fullness, tenderness   Rectum: negative    Psych:  Normal mood and affect   Skin:  Without obvious lesions   Eyes: symmetric, with normal movements and reactivity   Musculoskeletal:  Normal muscle tone and movements appreciated

## 2019-09-12 ENCOUNTER — OFFICE VISIT (OUTPATIENT)
Dept: URGENT CARE | Facility: CLINIC | Age: 39
End: 2019-09-12
Payer: COMMERCIAL

## 2019-09-12 VITALS
WEIGHT: 293 LBS | RESPIRATION RATE: 16 BRPM | HEART RATE: 105 BPM | DIASTOLIC BLOOD PRESSURE: 78 MMHG | TEMPERATURE: 98.9 F | HEIGHT: 68 IN | SYSTOLIC BLOOD PRESSURE: 132 MMHG | BODY MASS INDEX: 44.41 KG/M2 | OXYGEN SATURATION: 98 %

## 2019-09-12 DIAGNOSIS — J02.9 SORE THROAT: Primary | ICD-10-CM

## 2019-09-12 LAB — S PYO AG THROAT QL: NEGATIVE

## 2019-09-12 PROCEDURE — 87147 CULTURE TYPE IMMUNOLOGIC: CPT | Performed by: FAMILY MEDICINE

## 2019-09-12 PROCEDURE — 99213 OFFICE O/P EST LOW 20 MIN: CPT | Performed by: FAMILY MEDICINE

## 2019-09-12 PROCEDURE — 87070 CULTURE OTHR SPECIMN AEROBIC: CPT | Performed by: FAMILY MEDICINE

## 2019-09-12 NOTE — PATIENT INSTRUCTIONS
As we discussed, the rapid strep was negative  A culture will be sent to the lab  Call here in about 48 hours if you are not improving  Increase fluids, and consider Cepastat lozenges to soothe the throat

## 2019-09-12 NOTE — PROGRESS NOTES
Assessment/Plan:      Diagnoses and all orders for this visit:    Sore throat  -     POCT rapid strepA          Subjective:     Patient ID: Pantera Salas is a 37 y o  female  Patient is a 17-year-old female who is a   She began with a sore throat and low-grade fever yesterday  She also the this had several episodes of vomiting  Presently there is no fever  A rapid strep was negative  Her tongue is somewhat dry  Review of Systems   Constitutional: Negative  HENT: Positive for congestion and sore throat  Respiratory: Negative  Neurological: Negative  Objective:     Physical Exam   Constitutional: She is oriented to person, place, and time  She appears well-developed and well-nourished  HENT:   Head: Normocephalic and atraumatic  Right Ear: Hearing normal    Left Ear: Hearing normal    Pharynx appears to be perfectly normal    Eyes: EOM are normal    Neurological: She is alert and oriented to person, place, and time

## 2019-09-14 LAB — BACTERIA THROAT CULT: ABNORMAL

## 2019-11-21 DIAGNOSIS — F32.A DEPRESSION, UNSPECIFIED DEPRESSION TYPE: ICD-10-CM

## 2019-11-21 RX ORDER — ESCITALOPRAM OXALATE 20 MG/1
TABLET ORAL
Qty: 30 TABLET | Refills: 5 | OUTPATIENT
Start: 2019-11-21

## 2019-12-10 DIAGNOSIS — F41.1 GENERALIZED ANXIETY DISORDER: ICD-10-CM

## 2019-12-10 DIAGNOSIS — F32.A DEPRESSION, UNSPECIFIED DEPRESSION TYPE: ICD-10-CM

## 2019-12-10 RX ORDER — LORAZEPAM 0.5 MG/1
0.5 TABLET ORAL DAILY PRN
Qty: 30 TABLET | Refills: 0 | Status: SHIPPED | OUTPATIENT
Start: 2019-12-10 | End: 2020-04-01 | Stop reason: SDUPTHER

## 2019-12-10 RX ORDER — ESCITALOPRAM OXALATE 20 MG/1
20 TABLET ORAL
Qty: 90 TABLET | Refills: 0 | Status: SHIPPED | OUTPATIENT
Start: 2019-12-10 | End: 2020-03-10 | Stop reason: SDUPTHER

## 2020-02-02 ENCOUNTER — OFFICE VISIT (OUTPATIENT)
Dept: URGENT CARE | Facility: CLINIC | Age: 40
End: 2020-02-02
Payer: COMMERCIAL

## 2020-02-02 VITALS
OXYGEN SATURATION: 97 % | BODY MASS INDEX: 44.41 KG/M2 | HEART RATE: 88 BPM | WEIGHT: 293 LBS | TEMPERATURE: 99.2 F | RESPIRATION RATE: 14 BRPM | DIASTOLIC BLOOD PRESSURE: 101 MMHG | SYSTOLIC BLOOD PRESSURE: 148 MMHG | HEIGHT: 68 IN

## 2020-02-02 DIAGNOSIS — J20.8 VIRAL BRONCHITIS: Primary | ICD-10-CM

## 2020-02-02 PROCEDURE — 99213 OFFICE O/P EST LOW 20 MIN: CPT | Performed by: PHYSICIAN ASSISTANT

## 2020-02-02 RX ORDER — BENZONATATE 100 MG/1
100 CAPSULE ORAL 3 TIMES DAILY PRN
Qty: 21 CAPSULE | Refills: 0 | Status: SHIPPED | OUTPATIENT
Start: 2020-02-02 | End: 2020-02-09

## 2020-02-02 NOTE — PROGRESS NOTES
NAME: Johnathan Braga is a 37 y o  female  : 1980    MRN: [id number]      Assessment and Plan   Viral bronchitis [J20 8]  1  Viral bronchitis  benzonatate (TESSALON PERLES) 100 mg capsule   Exam findings are consistent with viral bronchitis  At this time will provided Pt with Tessalon Perles Take medication as noted  Recommend OTC Coricidin HBP  Take OTC Tylenol or ibuprofen for pain  If symptoms persist the next 2-3 days Pt should follow-up with PCP  Patient understands and agrees with treatment plans    Debra Fatima was seen today for cough  Diagnoses and all orders for this visit:    Viral bronchitis  -     benzonatate (TESSALON PERLES) 100 mg capsule; Take 1 capsule (100 mg total) by mouth 3 (three) times a day as needed for cough for up to 7 days        Patient Instructions   There are no Patient Instructions on file for this visit  Proceed to ER if symptoms worsen  Chief Complaint     Chief Complaint   Patient presents with    Cough     39yof here today with a cough that she has had since Wed  last week  Pt  reports no fevers  She would like to rule out broncitis          History of Present Illness     37year old presents c/o cough x 5 day  Pt admits rhinorrhea, nasal congestion,  S/T due to cough, PND, sinus pressure, ear pressure, sinus H/A  Pt denies  fever, chills, fatigue, n/v/d/c,  ear pain, sinus pain, , SOB, chest pain, difficulty breathing  Has taken OTC dayquil, nyquil and cough drops  Review of Systems   Review of Systems   Constitutional: Negative for chills, fatigue and fever  HENT: Positive for congestion, postnasal drip, rhinorrhea and sinus pressure  Negative for ear pain and sore throat  Respiratory: Positive for cough  Negative for chest tightness, shortness of breath and wheezing  Cardiovascular: Negative for chest pain and palpitations  Gastrointestinal: Negative for abdominal pain, constipation, diarrhea, nausea and vomiting     Neurological: Positive for headaches           Current Medications       Current Outpatient Medications:     acetaminophen (TYLENOL) 325 mg tablet, Take 2 tablets (650 mg total) by mouth every 6 (six) hours, Disp: 30 tablet, Rfl: 0    Azelastine HCl 137 MCG/SPRAY SOLN, 1-2 puffs each nostril twice a day when necessary nasal congestion, Disp: 1 Bottle, Rfl: 11    B-D ALLERGY SYRINGE 1CC/28G 28G X 1/2" 1 ML MISC, , Disp: , Rfl:     benzonatate (TESSALON PERLES) 100 mg capsule, Take 1 capsule (100 mg total) by mouth 3 (three) times a day as needed for cough for up to 7 days, Disp: 21 capsule, Rfl: 0    Cetirizine HCl (ZYRTEC ALLERGY) 10 MG CAPS, Take 1 capsule by mouth daily as needed , Disp: , Rfl:     docusate sodium (COLACE) 100 mg capsule, Take 1 capsule (100 mg total) by mouth 2 (two) times a day for 4 days, Disp: 8 capsule, Rfl: 0    docusate sodium (COLACE) 100 mg capsule, Take 1 capsule (100 mg total) by mouth 2 (two) times a day, Disp: 10 capsule, Rfl: 0    EPINEPHrine (EPIPEN) 0 3 mg/0 3 mL SOAJ, as needed , Disp: , Rfl:     EPINEPHrine (EPIPEN) 0 3 mg/0 3 mL SOAJ, Inject 0 3 mL (0 3 mg total) into a muscle once for 1 dose, Disp: 0 6 mL, Rfl: 1    escitalopram (LEXAPRO) 20 mg tablet, Take 1 tablet (20 mg total) by mouth daily at bedtime, Disp: 90 tablet, Rfl: 0    fluticasone (FLONASE) 50 mcg/act nasal spray, 1 spray into each nostril 2 (two) times a day (Patient taking differently: 1 spray into each nostril 2 (two) times a day as needed ), Disp: 1 Bottle, Rfl: 11    ibuprofen (MOTRIN) 600 mg tablet, Take 1 tablet (600 mg total) by mouth every 6 (six) hours as needed for mild pain, Disp: 20 tablet, Rfl: 0    LORazepam (ATIVAN) 0 5 mg tablet, Take 1 tablet (0 5 mg total) by mouth daily as needed for anxiety, Disp: 30 tablet, Rfl: 0    montelukast (SINGULAIR) 10 mg tablet, Take 1 tablet (10 mg total) by mouth daily at bedtime, Disp: 30 tablet, Rfl: 11    Current Allergies     Allergies as of 02/02/2020    (No Known Allergies)              Past Medical History:   Diagnosis Date    Allergic rhinitis     Anxiety     Depression     Family health problem     mother and grandmother h/o blood clots after surgery    Fibroid     Fullness in ear, left     Resolved [id number]    Hypertension     Resolved [id number]    Urinary tract infection     h/o       Past Surgical History:   Procedure Laterality Date    CHOLECYSTECTOMY      DC TOTAL ABDOM HYSTERECTOMY N/A 6/18/2019    Procedure: Supracervical IWONA, removal of bilat tubes, removal of IUD, cystoscopy;  Surgeon: Yazmin Murray MD;  Location: [facility];  Service: Gynecology    TOOTH EXTRACTION         Family History   Problem Relation Age of Onset    Anxiety disorder Mother     Other Mother         Blood clots    Hypertension Mother     Alcohol abuse Father     Hypertension Brother     Anxiety disorder Maternal Grandmother     Other Maternal Grandmother         Blood clots    Lung cancer Maternal Grandfather     Heart disease Family         cardiac disorder    Cancer Family         lung ca    Diabetes Family     Cancer Family     Ovarian cancer Maternal Aunt     Alcohol abuse Maternal Aunt 72    Prostate cancer Maternal Uncle          Medications have been verified  The following portions of the patient's history were reviewed and updated as appropriate: allergies, current medications, past family history, past medical history, past social history, past surgical history and problem list     Objective   BP (!) 148/101 (BP Location: Left arm, Patient Position: Sitting, Cuff Size: Standard)   Pulse 88   Temp 99 2 °F (37 3 °C)   Resp 14   Ht 5' 8" (1 727 m)   Wt (!) 144 kg (317 lb)   LMP 05/15/2019 (Approximate)   SpO2 97%   BMI 48 20 kg/m²      Physical Exam     Physical Exam   Constitutional: She appears well-developed and well-nourished  No distress  HENT:   Head: Normocephalic and atraumatic     Right Ear: Hearing, tympanic membrane, external ear and ear canal normal    Left Ear: Hearing, tympanic membrane, external ear and ear canal normal    Nose: Nose normal  Right sinus exhibits no maxillary sinus tenderness and no frontal sinus tenderness  Left sinus exhibits no maxillary sinus tenderness and no frontal sinus tenderness  Mouth/Throat: Uvula is midline, oropharynx is clear and moist and mucous membranes are normal  No oropharyngeal exudate  No tonsillar exudate  Cardiovascular: Normal rate, regular rhythm and normal heart sounds  Exam reveals no gallop and no friction rub  No murmur heard  Pulmonary/Chest: Effort normal and breath sounds normal  No stridor  She has no wheezes  She has no rales  Skin: She is not diaphoretic  Nursing note and vitals reviewed        Alessandra Álvarez PA-C

## 2020-02-08 ENCOUNTER — OFFICE VISIT (OUTPATIENT)
Dept: URGENT CARE | Facility: CLINIC | Age: 40
End: 2020-02-08
Payer: COMMERCIAL

## 2020-02-08 VITALS
DIASTOLIC BLOOD PRESSURE: 90 MMHG | TEMPERATURE: 98.2 F | BODY MASS INDEX: 44.41 KG/M2 | OXYGEN SATURATION: 97 % | HEART RATE: 116 BPM | SYSTOLIC BLOOD PRESSURE: 138 MMHG | RESPIRATION RATE: 18 BRPM | HEIGHT: 68 IN | WEIGHT: 293 LBS

## 2020-02-08 DIAGNOSIS — B96.89 ACUTE BACTERIAL BRONCHITIS: Primary | ICD-10-CM

## 2020-02-08 DIAGNOSIS — J20.8 ACUTE BACTERIAL BRONCHITIS: Primary | ICD-10-CM

## 2020-02-08 PROCEDURE — 99213 OFFICE O/P EST LOW 20 MIN: CPT | Performed by: PHYSICIAN ASSISTANT

## 2020-02-08 RX ORDER — ALBUTEROL SULFATE 90 UG/1
2 AEROSOL, METERED RESPIRATORY (INHALATION) EVERY 6 HOURS PRN
Qty: 18 G | Refills: 0 | Status: SHIPPED | OUTPATIENT
Start: 2020-02-08 | End: 2020-07-17 | Stop reason: ALTCHOICE

## 2020-02-08 RX ORDER — AZITHROMYCIN 250 MG/1
TABLET, FILM COATED ORAL
Qty: 6 TABLET | Refills: 0 | Status: SHIPPED | OUTPATIENT
Start: 2020-02-08 | End: 2020-02-12

## 2020-02-08 NOTE — PATIENT INSTRUCTIONS

## 2020-02-24 NOTE — PROGRESS NOTES
Assessment/Plan    Acute bacterial bronchitis [J20 8, B96 89]  1  Acute bacterial bronchitis  azithromycin (ZITHROMAX) 250 mg tablet    albuterol (VENTOLIN HFA) 90 mcg/act inhaler         Subjective:     Patient ID: Helen Zuniga is a 37 y o  female  Reason For Visit / Chief Complaint  Chief Complaint   Patient presents with    Cough     symptoms started approx 2 weeks ago, was in urgent care last week & got cough meds & mucinex  No change in symptoms    Nasal Congestion    Ear Fullness     Left ear    Pain With Breathing    Mouth Lesions     Left side of mouth         25-year-old female presents to the clinic with nasal congestion, irritation and fullness in left ear, cough, chest tightness and mouth lesions on the left side of her mouth  Patient states her symptoms started approximately 2 weeks ago and last week she was seen at urgent care and diagnosed with viral bronchitis  Patient states that she received cough medication and has also been taking Mucinex without improvement in symptoms  Patient denies fevers, chills, nausea, vomiting, abdominal pain        Past Medical History:   Diagnosis Date    Allergic rhinitis     Anxiety     Depression     Family health problem     mother and grandmother h/o blood clots after surgery    Fibroid     Fullness in ear, left     Resolved [id number]    Hypertension     Resolved [id number]    Urinary tract infection     h/o       Past Surgical History:   Procedure Laterality Date    CHOLECYSTECTOMY      [place] TOTAL ABDOM HYSTERECTOMY N/A 6/18/2019    Procedure: Supracervical IWONA, removal of bilat tubes, removal of IUD, cystoscopy;  Surgeon: Miladis Hare MD;  Location: [facility];  Service: Gynecology    TOOTH EXTRACTION         Family History   Problem Relation Age of Onset    Anxiety disorder Mother     Other Mother         Blood clots    Hypertension Mother     Alcohol abuse Father     Hypertension Brother     Anxiety disorder Maternal Grandmother  Other Maternal Grandmother         Blood clots    Lung cancer Maternal Grandfather     Heart disease Family         cardiac disorder    Cancer Family         lung ca    Diabetes Family     Cancer Family     Ovarian cancer Maternal Aunt     Alcohol abuse Maternal Aunt 72    Prostate cancer Maternal Uncle        Review of Systems   Constitutional: Negative for chills and fever  HENT: Positive for congestion, ear pain, mouth sores and rhinorrhea  Negative for sore throat  Respiratory: Positive for cough and chest tightness  Gastrointestinal: Negative for abdominal pain, diarrhea, nausea and vomiting  Musculoskeletal: Negative for myalgias  Neurological: Negative for dizziness, light-headedness and headaches  Objective:    /90 (BP Location: Right arm, Patient Position: Sitting)   Pulse (!) 116   Temp 98 2 °F (36 8 °C) (Tympanic)   Resp 18   Ht 5' 8" (1 727 m)   Wt (!) 144 kg (317 lb)   LMP 05/15/2019 (Approximate)   SpO2 97%   BMI 48 20 kg/m²     Physical Exam   Constitutional: She is oriented to person, place, and time  She appears well-developed and well-nourished  She is active  No distress  HENT:   Head: Normocephalic and atraumatic  Right Ear: Tympanic membrane normal    Left Ear: Tympanic membrane is not erythematous  A middle ear effusion is present  Mouth/Throat: Uvula is midline and mucous membranes are normal  Posterior oropharyngeal erythema (PND) present  Cardiovascular: Normal rate, regular rhythm and normal heart sounds  Pulmonary/Chest: Effort normal and breath sounds normal  She has no wheezes  She has no rhonchi  Musculoskeletal: Normal range of motion  Neurological: She is alert and oriented to person, place, and time  Skin: She is not diaphoretic  Nursing note and vitals reviewed

## 2020-02-28 DIAGNOSIS — F32.A DEPRESSION, UNSPECIFIED DEPRESSION TYPE: ICD-10-CM

## 2020-02-28 RX ORDER — ESCITALOPRAM OXALATE 20 MG/1
TABLET ORAL
Qty: 30 TABLET | Refills: 2 | OUTPATIENT
Start: 2020-02-28

## 2020-03-10 DIAGNOSIS — F32.A DEPRESSION, UNSPECIFIED DEPRESSION TYPE: ICD-10-CM

## 2020-03-10 RX ORDER — ESCITALOPRAM OXALATE 20 MG/1
20 TABLET ORAL
Qty: 90 TABLET | Refills: 1 | Status: SHIPPED | OUTPATIENT
Start: 2020-03-10 | End: 2020-03-17 | Stop reason: ALTCHOICE

## 2020-03-17 ENCOUNTER — OFFICE VISIT (OUTPATIENT)
Dept: FAMILY MEDICINE CLINIC | Facility: CLINIC | Age: 40
End: 2020-03-17
Payer: COMMERCIAL

## 2020-03-17 VITALS
RESPIRATION RATE: 18 BRPM | BODY MASS INDEX: 44.41 KG/M2 | DIASTOLIC BLOOD PRESSURE: 80 MMHG | SYSTOLIC BLOOD PRESSURE: 130 MMHG | HEART RATE: 68 BPM | HEIGHT: 68 IN | WEIGHT: 293 LBS | TEMPERATURE: 99 F

## 2020-03-17 DIAGNOSIS — Z12.39 SCREENING FOR MALIGNANT NEOPLASM OF BREAST: ICD-10-CM

## 2020-03-17 DIAGNOSIS — Z13.6 SCREENING FOR CARDIOVASCULAR CONDITION: ICD-10-CM

## 2020-03-17 DIAGNOSIS — Z11.4 SCREENING FOR HIV (HUMAN IMMUNODEFICIENCY VIRUS): ICD-10-CM

## 2020-03-17 DIAGNOSIS — Z13.1 SCREENING FOR DIABETES MELLITUS: ICD-10-CM

## 2020-03-17 DIAGNOSIS — F32.A DEPRESSION, UNSPECIFIED DEPRESSION TYPE: ICD-10-CM

## 2020-03-17 DIAGNOSIS — E66.01 CLASS 3 SEVERE OBESITY DUE TO EXCESS CALORIES WITHOUT SERIOUS COMORBIDITY WITH BODY MASS INDEX (BMI) OF 45.0 TO 49.9 IN ADULT (HCC): ICD-10-CM

## 2020-03-17 DIAGNOSIS — Z00.00 ANNUAL PHYSICAL EXAM: Primary | ICD-10-CM

## 2020-03-17 DIAGNOSIS — F41.1 GENERALIZED ANXIETY DISORDER: ICD-10-CM

## 2020-03-17 PROBLEM — J30.9 ALLERGIC RHINITIS: Status: ACTIVE | Noted: 2020-03-17

## 2020-03-17 PROBLEM — E66.813 CLASS 3 SEVERE OBESITY DUE TO EXCESS CALORIES WITHOUT SERIOUS COMORBIDITY WITH BODY MASS INDEX (BMI) OF 45.0 TO 49.9 IN ADULT (HCC): Status: ACTIVE | Noted: 2020-03-17

## 2020-03-17 PROBLEM — G47.9 DISTURBANCE IN SLEEP BEHAVIOR: Status: ACTIVE | Noted: 2020-03-17

## 2020-03-17 PROCEDURE — 3008F BODY MASS INDEX DOCD: CPT | Performed by: FAMILY MEDICINE

## 2020-03-17 PROCEDURE — 1036F TOBACCO NON-USER: CPT | Performed by: FAMILY MEDICINE

## 2020-03-17 PROCEDURE — 99396 PREV VISIT EST AGE 40-64: CPT | Performed by: FAMILY MEDICINE

## 2020-03-17 PROCEDURE — 99214 OFFICE O/P EST MOD 30 MIN: CPT | Performed by: FAMILY MEDICINE

## 2020-03-17 RX ORDER — DULOXETIN HYDROCHLORIDE 30 MG/1
CAPSULE, DELAYED RELEASE ORAL
Qty: 60 CAPSULE | Refills: 1 | Status: SHIPPED | OUTPATIENT
Start: 2020-03-17 | End: 2020-04-03 | Stop reason: SDUPTHER

## 2020-03-17 NOTE — PROGRESS NOTES
[address] [facility]    NAME: Leonardo Hodges  AGE: 37 y o  SEX: female  : 1980     DATE: 3/17/2020     Assessment and Plan:     Problem List Items Addressed This Visit        Other    Depression    Relevant Medications    DULoxetine (CYMBALTA) 30 mg delayed release capsule    Other Relevant Orders    Mammo screening bilateral w 3d & cad    CBC and differential    Comprehensive metabolic panel    LDL cholesterol, direct    Lipid panel    T4, free    TSH, 3rd generation    UA (URINE) with reflex to Scope    Generalized anxiety disorder    Relevant Medications    DULoxetine (CYMBALTA) 30 mg delayed release capsule    Other Relevant Orders    Mammo screening bilateral w 3d & cad    CBC and differential    Comprehensive metabolic panel    LDL cholesterol, direct    Lipid panel    T4, free    TSH, 3rd generation    UA (URINE) with reflex to Scope      Other Visit Diagnoses     Annual physical exam    -  Primary    Relevant Medications    DULoxetine (CYMBALTA) 30 mg delayed release capsule    Screening for HIV (human immunodeficiency virus)        Relevant Orders    Human Immunodeficiency Virus 1/2 Antigen / Antibody ( Fourth Generation) with Reflex Testing    Screening for malignant neoplasm of breast        Relevant Orders    Mammo screening bilateral w 3d & cad    Screening for cardiovascular condition        Relevant Orders    CBC and differential    Comprehensive metabolic panel    LDL cholesterol, direct    Lipid panel    T4, free    TSH, 3rd generation    UA (URINE) with reflex to Scope    Screening for diabetes mellitus        Relevant Orders    CBC and differential    Comprehensive metabolic panel    LDL cholesterol, direct    Lipid panel    T4, free    TSH, 3rd generation    UA (URINE) with reflex to Scope      The patient had unremarkable exam today in the office    She will go for the lab work as ordered and will follow up with the results  We are going to change her from the escitalopram to the duloxetine  Please see the accompanying progress note  We will see her back in the office again in 1 month  She will follow-up with her gynecologist as scheduled  Immunizations and preventive care screenings were discussed with patient today  Appropriate education was printed on patient's after visit summary  Counseling:  Dental Health: discussed importance of regular tooth brushing, flossing, and dental visits  Injury prevention: discussed safety/seat belts, safety helmets, smoke detectors, carbon dioxide detectors, and smoking near bedding or upholstery  · Exercise: the importance of regular exercise/physical activity was discussed  Recommend exercise 3-5 times per week for at least 30 minutes  BMI Counseling: Body mass index is 49 07 kg/m²  The BMI is above normal  Nutrition recommendations include decreasing portion sizes, encouraging healthy choices of fruits and vegetables, decreasing fast food intake, consuming healthier snacks, limiting drinks that contain sugar, moderation in carbohydrate intake, increasing intake of lean protein, reducing intake of saturated and trans fat and reducing intake of cholesterol  Exercise recommendations include exercising 3-5 times per week and obtaining a gym membership  No pharmacotherapy was ordered  Patient referred to PCP due to patient being overweight  Return in about 1 month (around 4/17/2020) for Recheck  Chief Complaint:     Chief Complaint   Patient presents with    Annual Exam     36 y/o female - sees GYN      History of Present Illness:     Adult Annual Physical   Patient here for a comprehensive physical exam  The patient reports problems - increased anxiety and depression  Gurjit Thomson is a 37 y o  female who presents today for a complete physical  she   has been feeling well and has no complaints today    The patient denies any chest pain, shortness of breath, or palpitations  There is no edema  There are no headaches or visual changes  There is no lightheadedness, dizziness, or fainting spells  There are no GI symptoms  The patient goes for dental exams every 6 months and sees her eye doctor  The patient is watching her diet and follows a regular exercise program    She is going to schedule her GYN exam   She is going see her allergist     She is having worsening anxiety lately and she is not sleeping well - the escitalopram is not working as well  She has been feeling this well for weeks  She is trying to get outside  She denies any suicidal ideations  She does have difficulty concentrating and focusing on things  She feel sad a lot and has frequent crying spells  She has been on the Lexapro for long time and feels that she needs a change  Diet and Physical Activity  · Diet/Nutrition: well balanced diet, frequent junk food, low fat diet and consuming 3-5 servings of fruits/vegetables daily  · Exercise: walking  Depression Screening  PHQ-9 Depression Screening    PHQ-9:    Frequency of the following problems over the past two weeks:       Little interest or pleasure in doing things:  0 - not at all  Feeling down, depressed, or hopeless:  0 - not at all  Trouble falling or staying asleep, or sleeping too much:  2 - more than half the days  Feeling tired or having little energy:  3 - nearly every day  Poor appetite or overeating:  3 - nearly every day  Feeling bad about yourself - or that you are a failure or have let yourself or your family down:  1 - several days  Trouble concentrating on things, such as reading the newspaper or watching television:  1 - several days  Moving or speaking so slowly that other people could have noticed   Or the opposite - being so fidgety or restless that you have been moving around a lot more than usual:  0 - not at all  Thoughts that you would be better off dead, or of hurting yourself in some way:  0 - not at all  PHQ-2 Score:  0       General Health  · Sleep: sleeps poorly  · Hearing: normal - bilateral   · Vision: no vision problems, goes for regular eye exams, most recent eye exam <1 year ago and wears contacts  · Dental: regular dental visits and brushes teeth twice daily  /GYN Health  · Patient is: S/P Hysterectomy- for fibroids  Review of Systems:     Review of Systems   Constitutional: Negative  HENT: Negative  Eyes: Negative  Respiratory: Negative  Cardiovascular: Negative  Gastrointestinal: Negative  Endocrine: Negative  Genitourinary: Negative  Musculoskeletal: Negative  Skin: Negative  Allergic/Immunologic: Negative  Neurological: Negative  Hematological: Negative  Psychiatric/Behavioral: Negative         Past Medical History:     Past Medical History:   Diagnosis Date    Allergic rhinitis     Anxiety     Depression     Family health problem     mother and grandmother h/o blood clots after surgery    Fibroid     Fullness in ear, left     Resolved [id number]    Hypertension     Resolved [id number]    Urinary tract infection     h/o      Past Surgical History:     Past Surgical History:   Procedure Laterality Date    CHOLECYSTECTOMY      [place] TOTAL ABDOM HYSTERECTOMY N/A 6/18/2019    Procedure: Supracervical IWONA, removal of bilat tubes, removal of IUD, cystoscopy;  Surgeon: Vivienne Meckel, MD;  Location: [facility];  Service: Gynecology    TOOTH EXTRACTION        Social History:        Social History     Socioeconomic History    Marital status: /Civil Union     Spouse name: None    Number of children: None    Years of education: None    Highest education level: None   Occupational History    None   Social Needs    Financial resource strain: Not hard at all   Lexy-Keiry insecurity:     Worry: Never true     Inability: Never true    Transportation needs:     Medical: No     Non-medical: No   Tobacco Use    Smoking status: Never Smoker    Smokeless tobacco: Never Used   Substance and Sexual Activity    Alcohol use: Yes     Frequency: Monthly or less     Drinks per session: 1 or 2     Binge frequency: Never    Drug use: No    Sexual activity: Yes     Partners: Male     Birth control/protection: Female Sterilization   Lifestyle    Physical activity:     Days per week: 0 days     Minutes per session: 0 min    Stress:  To some extent   Relationships    Social connections:     Talks on phone: More than three times a week     Gets together: Once a week     Attends [employer] orthodox service: More than 4 times per year     Active member of club or organization: Yes     Attends meetings of clubs or organizations: More than 4 times per year     Relationship status:     Intimate partner violence:     Fear of current or ex partner: No     Emotionally abused: No     Physically abused: No     Forced sexual activity: No   Other Topics Concern    None   Social History Narrative    Always uses seat belt    Caffeine use    [employer]      Family History:     Family History   Problem Relation Age of Onset    Anxiety disorder Mother     Other Mother         Blood clots    Hypertension Mother     Alcohol abuse Father     Hypertension Brother     Anxiety disorder Maternal Grandmother     Other Maternal Grandmother         Blood clots    Lung cancer Maternal Grandfather     Heart disease Family         cardiac disorder    Cancer Family         lung ca    Diabetes Family     Cancer Family     Ovarian cancer Maternal Aunt     Alcohol abuse Maternal Aunt 72    Prostate cancer Maternal Uncle       Current Medications:     Current Outpatient Medications   Medication Sig Dispense Refill    acetaminophen (TYLENOL) 325 mg tablet Take 2 tablets (650 mg total) by mouth every 6 (six) hours 30 tablet 0    Azelastine HCl 137 MCG/SPRAY SOLN 1-2 puffs each nostril twice a day when necessary nasal congestion 1 Bottle 11    B-D ALLERGY SYRINGE 1CC/28G 28G X 1/2" 1 ML MISC       Cetirizine HCl (ZYRTEC ALLERGY) 10 MG CAPS Take 1 capsule by mouth daily as needed       EPINEPHrine (EPIPEN) 0 3 mg/0 3 mL SOAJ Inject 0 3 mL (0 3 mg total) into a muscle once for 1 dose 0 6 mL 1    ibuprofen (MOTRIN) 600 mg tablet Take 1 tablet (600 mg total) by mouth every 6 (six) hours as needed for mild pain 20 tablet 0    LORazepam (ATIVAN) 0 5 mg tablet Take 1 tablet (0 5 mg total) by mouth daily as needed for anxiety 30 tablet 0    montelukast (SINGULAIR) 10 mg tablet Take 1 tablet (10 mg total) by mouth daily at bedtime 30 tablet 11    albuterol (VENTOLIN HFA) 90 mcg/act inhaler Inhale 2 puffs every 6 (six) hours as needed for wheezing (Patient not taking: Reported on 3/17/2020) 18 g 0    DULoxetine (CYMBALTA) 30 mg delayed release capsule Take 1 capsule daily for week then increase to 2 capsules daily 60 capsule 1     No current facility-administered medications for this visit  Allergies:     No Known Allergies   Physical Exam:     /80   Pulse 68   Temp 99 °F (37 2 °C) (Tympanic)   Resp 18   Ht 5' 7 5" (1 715 m)   Wt (!) 144 kg (318 lb)   LMP 05/15/2019 (Approximate)   BMI 49 07 kg/m²     Physical Exam   Constitutional: She is oriented to person, place, and time  She appears well-developed and well-nourished  HENT:   Head: Normocephalic and atraumatic  Mouth/Throat: No oropharyngeal exudate  Eyes: Pupils are equal, round, and reactive to light  Conjunctivae and EOM are normal    Neck: Normal range of motion  No JVD present  No tracheal deviation present  No thyromegaly present  Cardiovascular: Normal rate, regular rhythm, normal heart sounds and intact distal pulses  Exam reveals no gallop and no friction rub  No murmur heard  Pulmonary/Chest: Effort normal and breath sounds normal  No stridor  No respiratory distress  She has no wheezes  She has no rales  She exhibits no tenderness  Abdominal: Soft   Bowel sounds are normal  She exhibits no distension and no mass  There is no tenderness  There is no rebound and no guarding  Musculoskeletal: Normal range of motion  She exhibits no edema, tenderness or deformity  Lymphadenopathy:     She has no cervical adenopathy  Neurological: She is alert and oriented to person, place, and time  She has normal reflexes  She displays normal reflexes  No cranial nerve deficit  She exhibits normal muscle tone  Coordination normal    Skin: Skin is warm and dry                    Tom Rodriguez DO  [facility]

## 2020-03-17 NOTE — ASSESSMENT & PLAN NOTE
We will switch her from the escitalopram to duloxetine as mentioned previously  We will see her back as scheduled

## 2020-03-17 NOTE — PROGRESS NOTES
Assessment/Plan:  Depression  The patient is having worsening depression symptoms and the escitalopram is not working as effectively for her  We are going to change her to the duloxetine for better efficacy  We will wean her off the escitalopram as starting the duloxetine as follows: When you are ready to try weaning off the medication we can  try weaning you off of the escitaloptam ( Lexapro) as follows:  Week 1:  Take 1/2 tablet on Monday and Wed, and a whole tablet all other days  Week 2: Take 1/2 tablet Mon, Wed, Friday and Sun,  And a whole tablet the other days  Week 3: 1/2 tablet daily  Week 4:  Skip Mon, 1/2 tablet other days  Week 5:  Skip the medication Mon and Wed  And take 1/2 tablet the other days  - Continue to eliminate 1/2 tablet daily gradually until off of this  Please call the office if the anxiety symptoms come back  As you are weaning off the escitalopram, start the new medication as ordered  We will monitor her closely and she will call with any worsening symptoms  Generalized anxiety disorder  We will switch her from the escitalopram to duloxetine as mentioned previously  We will see her back as scheduled  Class 3 severe obesity due to excess calories without serious comorbidity with body mass index (BMI) of 45 0 to 49 9 in adult [facility])  I advised her to start weighing herself daily to track her weight  The patient was advised to start eating 7 non starchy vegetables and 3 fruits every day as well as 10-12 nuts per day  She was also advised to add on psyllium fiber 1 tbsp twice a day for goal of 13 g per day  She was advised to drink 16 oz of water before all meals and to avoid any artificially sweetened drinks  She was also advised to try high intensity interval training for 4 minutes per day along with resistance exercises  I also advised her to keep a food diary to track everything that she is eating in the course of the day    At meals, she should always cut his dish in  half and eat half her meal and then determine if she is still hungry prior to eating the additional half  We will see her back in the office as scheduled  Diagnoses and all orders for this visit:      Depression, unspecified depression type  -     CBC and differential; Future  -     Comprehensive metabolic panel; Future  -     LDL cholesterol, direct; Future  -     Lipid panel; Future  -     T4, free; Future  -     TSH, 3rd generation; Future  -     UA (URINE) with reflex to Scope; Future  -     DULoxetine (CYMBALTA) 30 mg delayed release capsule; Take 1 capsule daily for week then increase to 2 capsules daily  -     CBC and differential  -     Comprehensive metabolic panel  -     LDL cholesterol, direct  -     Lipid panel  -     T4, free  -     TSH, 3rd generation  -     UA (URINE) with reflex to Scope    Generalized anxiety disorder  -     CBC and differential; Future  -     Comprehensive metabolic panel; Future  -     LDL cholesterol, direct; Future  -     Lipid panel; Future  -     T4, free; Future  -     TSH, 3rd generation; Future  -     UA (URINE) with reflex to Scope; Future  -     DULoxetine (CYMBALTA) 30 mg delayed release capsule; Take 1 capsule daily for week then increase to 2 capsules daily  -     CBC and differential  -     Comprehensive metabolic panel  -     LDL cholesterol, direct  -     Lipid panel  -     T4, free  -     TSH, 3rd generation  -     UA (URINE) with reflex to Scope    Screening for HIV (human immunodeficiency virus)  -     Human Immunodeficiency Virus 1/2 Antigen / Antibody ( Fourth Generation) with Reflex Testing; Future  -     Human Immunodeficiency Virus 1/2 Antigen / Antibody ( Fourth Generation) with Reflex Testing    Screening for malignant neoplasm of breast  -     Mammo screening bilateral w 3d & cad; Future    Screening for cardiovascular condition  -     CBC and differential; Future  -     Comprehensive metabolic panel;  Future  -     LDL cholesterol, direct; Future  -     Lipid panel; Future  -     T4, free; Future  -     TSH, 3rd generation; Future  -     UA (URINE) with reflex to Scope; Future  -     CBC and differential  -     Comprehensive metabolic panel  -     LDL cholesterol, direct  -     Lipid panel  -     T4, free  -     TSH, 3rd generation  -     UA (URINE) with reflex to Scope    Screening for diabetes mellitus  -     CBC and differential; Future  -     Comprehensive metabolic panel; Future  -     LDL cholesterol, direct; Future  -     Lipid panel; Future  -     T4, free; Future  -     TSH, 3rd generation; Future  -     UA (URINE) with reflex to Scope; Future  -     CBC and differential  -     Comprehensive metabolic panel  -     LDL cholesterol, direct  -     Lipid panel  -     T4, free  -     TSH, 3rd generation  -     UA (URINE) with reflex to Scope       Return in about 1 month (around 4/17/2020) for Recheck  Subjective:   Chief Complaint   Patient presents with    Annual Exam     38 y/o female - sees GYN        Patient ID: Roverto Branch is a 37 y o  female presents today for [reason]  Roverto Branch is a 37 y o  female who presents today for a complete physical  she   has been feeling well and has no complaints today  The patient denies any chest pain, shortness of breath, or palpitations  There is no edema  There are no headaches or visual changes  There is no lightheadedness, dizziness, or fainting spells  There are no GI symptoms  The patient goes for dental exams every 6 months and sees her eye doctor  The patient is watching her diet and follows a regular exercise program    She is going to schedule her GYN exam   She is going see her allergist     She is having worsening anxiety lately and she is not sleeping well - the escitalopram is not working as well  She has been feeling this well for weeks  She is trying to get outside  She denies any suicidal ideations  She does have difficulty concentrating and focusing on things    She feel sad a lot and has frequent crying spells  She has been on the Lexapro for long time and feels that she needs a change        The following portions of the patient's history were reviewed and updated as appropriate: allergies, current medications, past family history, past medical history, past social history, past surgical history and problem list   Patient Active Problem List   Diagnosis    Female pelvic pain    Depression    Generalized anxiety disorder    S/P abdominal supracervical subtotal hysterectomy    Allergic rhinitis    Disturbance in sleep behavior    Class 3 severe obesity due to excess calories without serious comorbidity with body mass index (BMI) of 45 0 to 49 9 in adult [facility])     Past Medical History:   Diagnosis Date    Allergic rhinitis     Anxiety     Depression     Family health problem     mother and grandmother h/o blood clots after surgery    Fibroid     Fullness in ear, left     Resolved [id number]    Hypertension     Resolved [id number]    Urinary tract infection     h/o     Past Surgical History:   Procedure Laterality Date    CHOLECYSTECTOMY      WI TOTAL ABDOM HYSTERECTOMY N/A 6/18/2019    Procedure: Supracervical IWONA, removal of bilat tubes, removal of IUD, cystoscopy;  Surgeon: Sherryle Finger, MD;  Location: [facility] OR;  Service: Gynecology    TOOTH EXTRACTION       No Known Allergies  Family History   Problem Relation Age of Onset    Anxiety disorder Mother     Other Mother         Blood clots    Hypertension Mother     Alcohol abuse Father     Hypertension Brother     Anxiety disorder Maternal Grandmother     Other Maternal Grandmother         Blood clots    Lung cancer Maternal Grandfather     Heart disease Family         cardiac disorder    Cancer Family         lung ca    Diabetes Family     Cancer Family     Ovarian cancer Maternal Aunt     Alcohol abuse Maternal Aunt 72    Prostate cancer Maternal Uncle      Social History     Socioeconomic History    Marital status: /Civil Union     Spouse name: Not on file    Number of children: Not on file    Years of education: Not on file    Highest education level: Not on file   Occupational History    Not on file   Social Needs    Financial resource strain: Not hard at all   Lexy-Keiry insecurity:     Worry: Never true     Inability: Never true   [employer] needs:     Medical: No     Non-medical: No   Tobacco Use    Smoking status: Never Smoker    Smokeless tobacco: Never Used   Substance and Sexual Activity    Alcohol use: Yes     Frequency: Monthly or less     Drinks per session: 1 or 2     Binge frequency: Never    Drug use: No    Sexual activity: Yes     Partners: Male     Birth control/protection: Female Sterilization   Lifestyle    Physical activity:     Days per week: 0 days     Minutes per session: 0 min    Stress:  To some extent   Relationships    Social connections:     Talks on phone: More than three times a week     Gets together: Once a week     Attends [employer] service: More than 4 times per year     Active member of club or organization: Yes     Attends meetings of clubs or organizations: More than 4 times per year     Relationship status:     Intimate partner violence:     Fear of current or ex partner: No     Emotionally abused: No     Physically abused: No     Forced sexual activity: No   Other Topics Concern    Not on file   Social History Narrative    Always uses seat belt    Caffeine use    [employer]     Current Outpatient Medications on File Prior to Visit   Medication Sig Dispense Refill    acetaminophen (TYLENOL) 325 mg tablet Take 2 tablets (650 mg total) by mouth every 6 (six) hours 30 tablet 0    Azelastine HCl 137 MCG/SPRAY SOLN 1-2 puffs each nostril twice a day when necessary nasal congestion 1 Bottle 11    B-D ALLERGY SYRINGE 1CC/28G 28G X 1/2" 1 ML MISC       Cetirizine HCl (ZYRTEC ALLERGY) 10 MG CAPS Take 1 capsule by mouth daily as needed       EPINEPHrine (EPIPEN) 0 3 mg/0 3 mL SOAJ Inject 0 3 mL (0 3 mg total) into a muscle once for 1 dose 0 6 mL 1    ibuprofen (MOTRIN) 600 mg tablet Take 1 tablet (600 mg total) by mouth every 6 (six) hours as needed for mild pain 20 tablet 0    LORazepam (ATIVAN) 0 5 mg tablet Take 1 tablet (0 5 mg total) by mouth daily as needed for anxiety 30 tablet 0    montelukast (SINGULAIR) 10 mg tablet Take 1 tablet (10 mg total) by mouth daily at bedtime 30 tablet 11    [DISCONTINUED] escitalopram (LEXAPRO) 20 mg tablet Take 1 tablet (20 mg total) by mouth daily at bedtime 90 tablet 1    albuterol (VENTOLIN HFA) 90 mcg/act inhaler Inhale 2 puffs every 6 (six) hours as needed for wheezing (Patient not taking: Reported on 3/17/2020) 18 g 0    [DISCONTINUED] docusate sodium (COLACE) 100 mg capsule Take 1 capsule (100 mg total) by mouth 2 (two) times a day for 4 days 8 capsule 0    [DISCONTINUED] docusate sodium (COLACE) 100 mg capsule Take 1 capsule (100 mg total) by mouth 2 (two) times a day (Patient not taking: Reported on 2/8/2020) 10 capsule 0    [DISCONTINUED] EPINEPHrine (EPIPEN) 0 3 mg/0 3 mL SOAJ as needed       [DISCONTINUED] fluticasone (FLONASE) 50 mcg/act nasal spray 1 spray into each nostril 2 (two) times a day (Patient not taking: Reported on 3/17/2020) 1 Bottle 11     No current facility-administered medications on file prior to visit  Review of Systems   Constitutional: Negative  HENT: Negative  Eyes: Negative  Respiratory: Negative  Cardiovascular: Negative  Gastrointestinal: Negative  Endocrine: Negative  Genitourinary: Negative  Musculoskeletal: Negative  Skin: Negative  Allergic/Immunologic: Negative  Neurological: Negative  Hematological: Negative  Psychiatric/Behavioral: Positive for dysphoric mood  The patient is nervous/anxious          Objective:  Vitals:    03/17/20 1454 03/17/20 1548   BP: 146/90 130/80   BP Location: Right arm    Patient Position: Sitting    Cuff Size: Large    Pulse: 72 68   Resp: 18    Temp: 99 °F (37 2 °C)    TempSrc: Tympanic    Weight: (!) 144 kg (318 lb)    Height: 5' 7 5" (1 715 m)      Body mass index is 49 07 kg/m²  Physical Exam   Constitutional: She is oriented to person, place, and time  She appears well-developed and well-nourished  HENT:   Head: Normocephalic and atraumatic  Mouth/Throat: No oropharyngeal exudate  Eyes: Pupils are equal, round, and reactive to light  Conjunctivae and EOM are normal    Neck: Normal range of motion  No JVD present  No tracheal deviation present  No thyromegaly present  Cardiovascular: Normal rate, regular rhythm, normal heart sounds and intact distal pulses  Exam reveals no gallop and no friction rub  No murmur heard  Pulmonary/Chest: Effort normal and breath sounds normal  No stridor  No respiratory distress  She has no wheezes  She has no rales  She exhibits no tenderness  Abdominal: Soft  Bowel sounds are normal  She exhibits no distension and no mass  There is no tenderness  There is no rebound and no guarding  Musculoskeletal: Normal range of motion  She exhibits no edema, tenderness or deformity  Lymphadenopathy:     She has no cervical adenopathy  Neurological: She is alert and oriented to person, place, and time  She has normal reflexes  She displays normal reflexes  No cranial nerve deficit  She exhibits normal muscle tone  Coordination normal    Skin: Skin is warm and dry

## 2020-03-17 NOTE — ASSESSMENT & PLAN NOTE
I advised her to start weighing herself daily to track her weight  The patient was advised to start eating 7 non starchy vegetables and 3 fruits every day as well as 10-12 nuts per day  She was also advised to add on psyllium fiber 1 tbsp twice a day for goal of 13 g per day  She was advised to drink 16 oz of water before all meals and to avoid any artificially sweetened drinks  She was also advised to try high intensity interval training for 4 minutes per day along with resistance exercises  I also advised her to keep a food diary to track everything that she is eating in the course of the day  At meals, she should always cut his dish in  half and eat half her meal and then determine if she is still hungry prior to eating the additional half  We will see her back in the office as scheduled

## 2020-03-17 NOTE — ASSESSMENT & PLAN NOTE
The patient is having worsening depression symptoms and the escitalopram is not working as effectively for her  We are going to change her to the duloxetine for better efficacy  We will wean her off the escitalopram as starting the duloxetine as follows: When you are ready to try weaning off the medication we can  try weaning you off of the escitaloptam ( Lexapro) as follows:  Week 1:  Take 1/2 tablet on Monday and Wed, and a whole tablet all other days  Week 2: Take 1/2 tablet Mon, Wed, Friday and Sun,  And a whole tablet the other days  Week 3: 1/2 tablet daily  Week 4:  Skip Mon, 1/2 tablet other days  Week 5:  Skip the medication Mon and Wed  And take 1/2 tablet the other days  - Continue to eliminate 1/2 tablet daily gradually until off of this  Please call the office if the anxiety symptoms come back  As you are weaning off the escitalopram, start the new medication as ordered  We will monitor her closely and she will call with any worsening symptoms

## 2020-03-17 NOTE — PATIENT INSTRUCTIONS
When you are ready to try weaning off the medication we can  try weaning you off of the escitaloptam ( Lexapro) as follows:  Week 1:  Take 1/2 tablet on Monday and Wed, and a whole tablet all other days  Week 2: Take 1/2 tablet Mon, Wed, Friday and Sun,  And a whole tablet the other days  Week 3: 1/2 tablet daily  Week 4:  Skip Mon, 1/2 tablet other days  Week 5:  Skip the medication Mon and Wed  And take 1/2 tablet the other days  - Continue to eliminate 1/2 tablet daily gradually until off of this  Please call the office if the anxiety symptoms come back  As you are weaning off the escitalopram, start the new medication as ordered  Wellness Visit for Adults   AMBULATORY CARE:   A wellness visit  is when you see your healthcare provider to get screened for health problems  You can also get advice on how to stay healthy  Write down your questions so you remember to ask them  Ask your healthcare provider how often you should have a wellness visit  What happens at a wellness visit:  Your healthcare provider will ask about your health, and your family history of health problems  This includes high blood pressure, heart disease, and cancer  He or she will ask if you have symptoms that concern you, if you smoke, and about your mood  You may also be asked about your intake of medicines, supplements, food, and alcohol  Any of the following may be done:  · Your weight  will be checked  Your height may also be checked so your body mass index (BMI) can be calculated  Your BMI shows if you are at a healthy weight  · Your blood pressure  and heart rate will be checked  Your temperature may also be checked  · Blood and urine tests  may be done  Blood tests may be done to check your cholesterol levels  Abnormal cholesterol levels increase your risk for heart disease and stroke  You may also need a blood or urine test to check for diabetes if you are at increased risk   Urine tests may be done to look for signs of an infection or kidney disease  · A physical exam  includes checking your heartbeat and lungs with a stethoscope  Your healthcare provider may also check your skin to look for sun damage  · Screening tests  may be recommended  A screening test is done to check for diseases that may not cause symptoms  The screening tests you may need depend on your age, gender, family history, and lifestyle habits  For example, colorectal screening may be recommended if you are 48years old or older  Screening tests you need if you are a woman:   · A Pap smear  is used to screen for cervical cancer  Pap smears are usually done every 3 to 5 years depending on your age  You may need them more often if you have had abnormal Pap smear test results in the past  Ask your healthcare provider how often you should have a Pap smear  · A mammogram  is an x-ray of your breasts to screen for breast cancer  Experts recommend mammograms every 2 years starting at age 48 years  You may need a mammogram at age 52 years or younger if you have an increased risk for breast cancer  Talk to your healthcare provider about when you should start having mammograms and how often you need them  Vaccines you may need:   · Get an influenza vaccine  every year  The influenza vaccine protects you from the flu  Several types of viruses cause the flu  The viruses change over time, so new vaccines are made each year  · Get a tetanus-diphtheria (Td) booster vaccine  every 10 years  This vaccine protects you against tetanus and diphtheria  Tetanus is a severe infection that may cause painful muscle spasms and lockjaw  Diphtheria is a severe bacterial infection that causes a thick covering in the back of your mouth and throat  · Get a human papillomavirus (HPV) vaccine  if you are female and aged 23 to 32 or male 23 to 24 and never received it  This vaccine protects you from HPV infection   HPV is the most common infection spread by sexual contact  HPV may also cause vaginal, penile, and anal cancers  · Get a pneumococcal vaccine  if you are aged 72 years or older  The pneumococcal vaccine is an injection given to protect you from pneumococcal disease  Pneumococcal disease is an infection caused by pneumococcal bacteria  The infection may cause pneumonia, meningitis, or an ear infection  · Get a shingles vaccine  if you are aged 61 or older, even if you have had shingles before  The shingles vaccine is an injection to protect you from the varicella-zoster virus  This is the same virus that causes chickenpox  Shingles is a painful rash that develops in people who had chickenpox or have been exposed to the virus  How to eat healthy:  My Plate is a model for planning healthy meals  It shows the types and amounts of foods that should go on your plate  Fruits and vegetables make up about half of your plate, and grains and protein make up the other half  A serving of dairy is included on the side of your plate  The amount of calories and serving sizes you need depends on your age, gender, weight, and height  Examples of healthy foods are listed below:  · Eat a variety of vegetables  such as dark green, red, and orange vegetables  You can also include canned vegetables low in sodium (salt) and frozen vegetables without added butter or sauces  · Eat a variety of fresh fruits , canned fruit in 100% juice, frozen fruit, and dried fruit  · Include whole grains  At least half of the grains you eat should be whole grains  Examples include whole-wheat bread, wheat pasta, brown rice, and whole-grain cereals such as oatmeal     · Eat a variety of protein foods such as seafood (fish and shellfish), lean meat, and poultry without skin (turkey and chicken)  Examples of lean meats include pork leg, shoulder, or tenderloin, and beef round, sirloin, tenderloin, and extra lean ground beef   Other protein foods include eggs and egg substitutes, beans, peas, soy products, nuts, and seeds  · Choose low-fat dairy products such as skim or 1% milk or low-fat yogurt, cheese, and cottage cheese  · Limit unhealthy fats  such as butter, hard margarine, and shortening  Exercise:  Exercise at least 30 minutes per day on most days of the week  Some examples of exercise include walking, biking, dancing, and swimming  You can also fit in more physical activity by taking the stairs instead of the elevator or parking farther away from stores  Include muscle strengthening activities 2 days each week  Regular exercise provides many health benefits  It helps you manage your weight, and decreases your risk for type 2 diabetes, heart disease, stroke, and high blood pressure  Exercise can also help improve your mood  Ask your healthcare provider about the best exercise plan for you  General health and safety guidelines:   · Do not smoke  Nicotine and other chemicals in cigarettes and cigars can cause lung damage  Ask your healthcare provider for information if you currently smoke and need help to quit  E-cigarettes or smokeless tobacco still contain nicotine  Talk to your healthcare provider before you use these products  · Limit alcohol  A drink of alcohol is 12 ounces of beer, 5 ounces of wine, or 1½ ounces of liquor  · Lose weight, if needed  Being overweight increases your risk of certain health conditions  These include heart disease, high blood pressure, type 2 diabetes, and certain types of cancer  · Protect your skin  Do not sunbathe or use tanning beds  Use sunscreen with a SPF 15 or higher  Apply sunscreen at least 15 minutes before you go outside  Reapply sunscreen every 2 hours  Wear protective clothing, hats, and sunglasses when you are outside  · Drive safely  Always wear your seatbelt  Make sure everyone in your car wears a seatbelt  A seatbelt can save your life if you are in an accident   Do not use your cell phone when you are driving  This could distract you and cause an accident  Pull over if you need to make a call or send a text message  · Practice safe sex  Use latex condoms if are sexually active and have more than one partner  Your healthcare provider may recommend screening tests for sexually transmitted infections (STIs)  · Wear helmets, lifejackets, and protective gear  Always wear a helmet when you ride a bike or motorcycle, go skiing, or play sports that could cause a head injury  Wear protective equipment when you play sports  Wear a lifejacket when you are on a boat or doing water sports  © 2017 [facility] Information is for End User's use only and may not be sold, redistributed or otherwise used for commercial purposes  All illustrations and images included in CareNotes® are the copyrighted property of A D A M [employer] Inc  or [employer]  The above information is an  only  It is not intended as medical advice for individual conditions or treatments  Talk to your doctor, nurse or pharmacist before following any medical regimen to see if it is safe and effective for you

## 2020-03-21 LAB
ALBUMIN SERPL-MCNC: 4.3 G/DL (ref 3.8–4.8)
ALBUMIN/GLOB SERPL: 1.9 {RATIO} (ref 1.2–2.2)
ALP SERPL-CCNC: 90 IU/L (ref 39–117)
ALT SERPL-CCNC: 12 IU/L (ref 0–32)
APPEARANCE UR: CLEAR
AST SERPL-CCNC: 12 IU/L (ref 0–40)
BASOPHILS # BLD AUTO: 0 X10E3/UL (ref 0–0.2)
BASOPHILS NFR BLD AUTO: 0 %
BILIRUB SERPL-MCNC: 0.4 MG/DL (ref 0–1.2)
BILIRUB UR QL STRIP: NEGATIVE
BUN SERPL-MCNC: 13 MG/DL (ref 6–24)
BUN/CREAT SERPL: 20 (ref 9–23)
CALCIUM SERPL-MCNC: 9 MG/DL (ref 8.7–10.2)
CHLORIDE SERPL-SCNC: 102 MMOL/L (ref 96–106)
CHOLEST SERPL-MCNC: 229 MG/DL (ref 100–199)
CHOLEST/HDLC SERPL: 5.1 RATIO (ref 0–4.4)
CO2 SERPL-SCNC: 22 MMOL/L (ref 20–29)
COLOR UR: YELLOW
CREAT SERPL-MCNC: 0.66 MG/DL (ref 0.57–1)
EOSINOPHIL # BLD AUTO: 0.1 X10E3/UL (ref 0–0.4)
EOSINOPHIL NFR BLD AUTO: 1 %
ERYTHROCYTE [DISTWIDTH] IN BLOOD BY AUTOMATED COUNT: 15.1 % (ref 11.7–15.4)
GLOBULIN SER-MCNC: 2.3 G/DL (ref 1.5–4.5)
GLUCOSE SERPL-MCNC: 109 MG/DL (ref 65–99)
GLUCOSE UR QL: NEGATIVE
HCT VFR BLD AUTO: 39.2 % (ref 34–46.6)
HDLC SERPL-MCNC: 45 MG/DL
HGB BLD-MCNC: 13.3 G/DL (ref 11.1–15.9)
HGB UR QL STRIP: NEGATIVE
HIV 1+2 AB+HIV1 P24 AG SERPL QL IA: NON REACTIVE
IMM GRANULOCYTES # BLD: 0 X10E3/UL (ref 0–0.1)
IMM GRANULOCYTES NFR BLD: 0 %
KETONES UR QL STRIP: NEGATIVE
LDLC SERPL CALC-MCNC: 140 MG/DL (ref 0–99)
LDLC SERPL DIRECT ASSAY-MCNC: 150 MG/DL (ref 0–99)
LEUKOCYTE ESTERASE UR QL STRIP: NEGATIVE
LYMPHOCYTES # BLD AUTO: 1.5 X10E3/UL (ref 0.7–3.1)
LYMPHOCYTES NFR BLD AUTO: 19 %
MCH RBC QN AUTO: 27.7 PG (ref 26.6–33)
MCHC RBC AUTO-ENTMCNC: 33.9 G/DL (ref 31.5–35.7)
MCV RBC AUTO: 82 FL (ref 79–97)
MICRO URNS: NORMAL
MONOCYTES # BLD AUTO: 0.5 X10E3/UL (ref 0.1–0.9)
MONOCYTES NFR BLD AUTO: 6 %
NEUTROPHILS # BLD AUTO: 5.6 X10E3/UL (ref 1.4–7)
NEUTROPHILS NFR BLD AUTO: 74 %
NITRITE UR QL STRIP: NEGATIVE
PH UR STRIP: 7 [PH] (ref 5–7.5)
PLATELET # BLD AUTO: 270 X10E3/UL (ref 150–450)
POTASSIUM SERPL-SCNC: 4 MMOL/L (ref 3.5–5.2)
PROT SERPL-MCNC: 6.6 G/DL (ref 6–8.5)
PROT UR QL STRIP: NEGATIVE
RBC # BLD AUTO: 4.81 X10E6/UL (ref 3.77–5.28)
SL AMB EGFR AFRICAN AMERICAN: 127 ML/MIN/1.73
SL AMB EGFR NON AFRICAN AMERICAN: 110 ML/MIN/1.73
SL AMB VLDL CHOLESTEROL CALC: 44 MG/DL (ref 5–40)
SODIUM SERPL-SCNC: 137 MMOL/L (ref 134–144)
SP GR UR: 1.02 (ref 1–1.03)
T4 FREE SERPL-MCNC: 1.08 NG/DL (ref 0.82–1.77)
TRIGL SERPL-MCNC: 219 MG/DL (ref 0–149)
TSH SERPL DL<=0.005 MIU/L-ACNC: 3.2 UIU/ML (ref 0.45–4.5)
UROBILINOGEN UR STRIP-ACNC: 0.2 MG/DL (ref 0.2–1)
WBC # BLD AUTO: 7.7 X10E3/UL (ref 3.4–10.8)

## 2020-04-01 DIAGNOSIS — F41.1 GENERALIZED ANXIETY DISORDER: ICD-10-CM

## 2020-04-01 DIAGNOSIS — Z91.09 POLLEN ALLERGIES: ICD-10-CM

## 2020-04-01 RX ORDER — LORAZEPAM 0.5 MG/1
0.5 TABLET ORAL DAILY PRN
Qty: 30 TABLET | Refills: 0 | Status: SHIPPED | OUTPATIENT
Start: 2020-04-01 | End: 2020-07-17 | Stop reason: ALTCHOICE

## 2020-04-01 RX ORDER — AZELASTINE HYDROCHLORIDE 137 UG/1
SPRAY, METERED NASAL
Qty: 1 BOTTLE | Refills: 11 | Status: SHIPPED | OUTPATIENT
Start: 2020-04-01 | End: 2022-03-01 | Stop reason: SDUPTHER

## 2020-04-03 DIAGNOSIS — F41.1 GENERALIZED ANXIETY DISORDER: ICD-10-CM

## 2020-04-03 DIAGNOSIS — F32.A DEPRESSION, UNSPECIFIED DEPRESSION TYPE: ICD-10-CM

## 2020-04-03 RX ORDER — DULOXETIN HYDROCHLORIDE 30 MG/1
CAPSULE, DELAYED RELEASE ORAL
Qty: 60 CAPSULE | Refills: 5 | Status: SHIPPED | OUTPATIENT
Start: 2020-04-03 | End: 2020-04-15 | Stop reason: SDUPTHER

## 2020-04-15 ENCOUNTER — TELEMEDICINE (OUTPATIENT)
Dept: FAMILY MEDICINE CLINIC | Facility: CLINIC | Age: 40
End: 2020-04-15
Payer: COMMERCIAL

## 2020-04-15 VITALS
BODY MASS INDEX: 49.07 KG/M2 | TEMPERATURE: 97.6 F | SYSTOLIC BLOOD PRESSURE: 123 MMHG | HEIGHT: 68 IN | HEART RATE: 93 BPM | DIASTOLIC BLOOD PRESSURE: 89 MMHG

## 2020-04-15 DIAGNOSIS — F32.A DEPRESSION, UNSPECIFIED DEPRESSION TYPE: Primary | ICD-10-CM

## 2020-04-15 DIAGNOSIS — F41.1 GENERALIZED ANXIETY DISORDER: ICD-10-CM

## 2020-04-15 PROCEDURE — 99214 OFFICE O/P EST MOD 30 MIN: CPT | Performed by: FAMILY MEDICINE

## 2020-04-15 RX ORDER — DULOXETIN HYDROCHLORIDE 60 MG/1
60 CAPSULE, DELAYED RELEASE ORAL DAILY
Qty: 90 CAPSULE | Refills: 1 | Status: SHIPPED | OUTPATIENT
Start: 2020-04-15 | End: 2020-10-14 | Stop reason: SDUPTHER

## 2020-06-24 ENCOUNTER — APPOINTMENT (OUTPATIENT)
Dept: RADIOLOGY | Facility: CLINIC | Age: 40
End: 2020-06-24
Payer: COMMERCIAL

## 2020-06-24 ENCOUNTER — OFFICE VISIT (OUTPATIENT)
Dept: URGENT CARE | Facility: CLINIC | Age: 40
End: 2020-06-24
Payer: COMMERCIAL

## 2020-06-24 VITALS
DIASTOLIC BLOOD PRESSURE: 88 MMHG | BODY MASS INDEX: 44.41 KG/M2 | SYSTOLIC BLOOD PRESSURE: 132 MMHG | RESPIRATION RATE: 16 BRPM | OXYGEN SATURATION: 97 % | HEART RATE: 90 BPM | WEIGHT: 293 LBS | HEIGHT: 68 IN | TEMPERATURE: 97.4 F

## 2020-06-24 DIAGNOSIS — M25.471 RIGHT ANKLE SWELLING: Primary | ICD-10-CM

## 2020-06-24 DIAGNOSIS — M25.471 RIGHT ANKLE SWELLING: ICD-10-CM

## 2020-06-24 PROCEDURE — 73610 X-RAY EXAM OF ANKLE: CPT

## 2020-06-24 PROCEDURE — 99213 OFFICE O/P EST LOW 20 MIN: CPT | Performed by: PHYSICIAN ASSISTANT

## 2020-06-24 RX ORDER — PREDNISONE 10 MG/1
TABLET ORAL
Qty: 15 TABLET | Refills: 0 | Status: SHIPPED | OUTPATIENT
Start: 2020-06-24 | End: 2020-07-17 | Stop reason: ALTCHOICE

## 2020-07-17 ENCOUNTER — OFFICE VISIT (OUTPATIENT)
Dept: FAMILY MEDICINE CLINIC | Facility: CLINIC | Age: 40
End: 2020-07-17
Payer: COMMERCIAL

## 2020-07-17 VITALS
WEIGHT: 293 LBS | OXYGEN SATURATION: 96 % | HEIGHT: 68 IN | DIASTOLIC BLOOD PRESSURE: 88 MMHG | SYSTOLIC BLOOD PRESSURE: 124 MMHG | BODY MASS INDEX: 44.41 KG/M2 | TEMPERATURE: 96.6 F | HEART RATE: 68 BPM

## 2020-07-17 DIAGNOSIS — F41.1 GENERALIZED ANXIETY DISORDER: ICD-10-CM

## 2020-07-17 DIAGNOSIS — F32.A DEPRESSION, UNSPECIFIED DEPRESSION TYPE: Primary | ICD-10-CM

## 2020-07-17 DIAGNOSIS — Z12.31 ENCOUNTER FOR SCREENING MAMMOGRAM FOR BREAST CANCER: ICD-10-CM

## 2020-07-17 PROCEDURE — 1036F TOBACCO NON-USER: CPT | Performed by: FAMILY MEDICINE

## 2020-07-17 PROCEDURE — 99214 OFFICE O/P EST MOD 30 MIN: CPT | Performed by: FAMILY MEDICINE

## 2020-07-17 NOTE — PROGRESS NOTES
Assessment/Plan:  Depression  The patient continues to do well on the duloxetine  We have made no changes today  The patient will continue with her current medication  Generalized anxiety disorder  The patient's anxiety symptoms are well controlled with the current dose of her duloxetine  We have made no changes today  We will see her back as scheduled  The patient has a small avulsion laceration on her right thumb that is healing well  She will continue with her local care to the area  She will continue applying Neosporin daily  She will call with any increased redness, swelling, or pus drainage from the area  Diagnoses and all orders for this visit:    Depression, unspecified depression type    Generalized anxiety disorder    Encounter for screening mammogram for breast cancer  -     Mammo screening bilateral w 3d & cad; Future       Return in about 6 months (around 1/17/2021)  Subjective:   Chief Complaint   Patient presents with    Follow-up     3 month medication and cut on thumb         Patient ID: Mary Grace White is a 41 y o  female presents today for a routine checkup  Mary Grace White is a 41 y o  female who presents today for follow-up of her depression and anxiety  She is doing very well on her duloxetine 60 mg daily  She has been tracking her food and lost 6 lbs  She is walking more and working in the garden  She had some GI symptoms early in the week that resolved  She is doing well with the duloxetine- there is some anxiety, but that is keeping it under control  There is a difference- she is not as anxious  The patient denies any chest pain, shortness of breath, or palpitations  There is no edema  There are no headaches or visual changes  There is no lightheadedness, dizziness, or fainting spells  She needs to see her sleep specialist   She cut sliced her right thumb with a mandolin on 7/15/2020- she has been treating it at home      Anxiety   Presents for follow-up visit  Patient reports no chest pain, compulsions, confusion, decreased concentration, depressed mood, dizziness, dry mouth, excessive worry, feeling of choking, hyperventilation, impotence, insomnia, irritability, malaise, muscle tension, nausea, nervous/anxious behavior, obsessions, palpitations, panic, restlessness or shortness of breath  Symptoms occur constantly           The following portions of the patient's history were reviewed and updated as appropriate: allergies, current medications, past family history, past medical history, past social history, past surgical history and problem list   Patient Active Problem List   Diagnosis    Female pelvic pain    Depression    Generalized anxiety disorder    S/P abdominal supracervical subtotal hysterectomy    Allergic rhinitis    Disturbance in sleep behavior    Class 3 severe obesity due to excess calories without serious comorbidity with body mass index (BMI) of 45 0 to 49 9 in adult [facility])     Past Medical History:   Diagnosis Date    Allergic rhinitis     Anxiety     Depression     Family health problem     mother and grandmother h/o blood clots after surgery    Fibroid     Fullness in ear, left     Resolved [id number]    Hypertension     Resolved [id number]    Urinary tract infection     h/o     Past Surgical History:   Procedure Laterality Date    CHOLECYSTECTOMY      MD TOTAL ABDOM HYSTERECTOMY N/A 6/18/2019    Procedure: Supracervical IWONA, removal of bilat tubes, removal of IUD, cystoscopy;  Surgeon: Brandin Miguel MD;  Location: [facility];  Service: Gynecology    TOOTH EXTRACTION       No Known Allergies  Family History   Problem Relation Age of Onset    Anxiety disorder Mother     Other Mother         Blood clots    Hypertension Mother     Alcohol abuse Father     Hypertension Brother     Anxiety disorder Maternal Grandmother     Other Maternal Grandmother         Blood clots    Lung cancer Maternal Grandfather     Heart disease Family         cardiac disorder    Cancer Family         lung ca    Diabetes Family     Cancer Family     Ovarian cancer Maternal Aunt     Alcohol abuse Maternal Aunt 72    Prostate cancer Maternal Uncle      Social History     Socioeconomic History    Marital status: /Civil Union     Spouse name: Not on file    Number of children: Not on file    Years of education: Not on file    Highest education level: Not on file   Occupational History    Not on file   Social Needs    Financial resource strain: Not hard at all   Lexy-Keiry insecurity:     Worry: Never true     Inability: Never true   Penemarie K Murphy needs:     Medical: No     Non-medical: No   Tobacco Use    Smoking status: Never Smoker    Smokeless tobacco: Never Used   Substance and Sexual Activity    Alcohol use: Yes     Frequency: Monthly or less     Drinks per session: 1 or 2     Binge frequency: Never    Drug use: No    Sexual activity: Yes     Partners: Male     Birth control/protection: Female Sterilization   Lifestyle    Physical activity:     Days per week: 0 days     Minutes per session: 0 min    Stress:  To some extent   Relationships    Social connections:     Talks on phone: More than three times a week     Gets together: Once a week     Attends [employer] service: More than 4 times per year     Active member of club or organization: Yes     Attends meetings of clubs or organizations: More than 4 times per year     Relationship status:     Intimate partner violence:     Fear of current or ex partner: No     Emotionally abused: No     Physically abused: No     Forced sexual activity: No   Other Topics Concern    Not on file   Social History Narrative    Always uses seat belt    Caffeine use    [employer] ([employer])     Current Outpatient Medications on File Prior to Visit   Medication Sig Dispense Refill    Azelastine HCl 137 MCG/SPRAY SOLN 1-2 puffs each nostril twice a day when necessary nasal congestion 1 Bottle 11    B-D ALLERGY SYRINGE 1CC/28G 28G X 1/2" 1 ML MISC       Cetirizine HCl (ZYRTEC ALLERGY) 10 MG CAPS Take 1 capsule by mouth daily as needed       DULoxetine (CYMBALTA) 60 mg delayed release capsule Take 1 capsule (60 mg total) by mouth daily 90 capsule 1    montelukast (SINGULAIR) 10 mg tablet Take 1 tablet (10 mg total) by mouth daily at bedtime 30 tablet 11     No current facility-administered medications on file prior to visit  Review of Systems   Constitutional: Negative  Negative for irritability  HENT: Negative  Eyes: Negative  Respiratory: Negative  Negative for shortness of breath  Cardiovascular: Negative  Negative for chest pain and palpitations  Gastrointestinal: Negative  Negative for nausea  Endocrine: Negative  Genitourinary: Negative  Negative for impotence  Musculoskeletal: Negative  Skin: Negative  Allergic/Immunologic: Negative  Neurological: Negative  Negative for dizziness  Hematological: Negative  Psychiatric/Behavioral: Negative  Negative for confusion and decreased concentration  The patient is not nervous/anxious and does not have insomnia  Objective:  Vitals:    07/17/20 0843 07/17/20 0937   BP: 138/96 124/88   BP Location: Right arm    Patient Position: Sitting    Cuff Size: Extra-Large    Pulse: 93 68   Temp: (!) 96 6 °F (35 9 °C)    TempSrc: Tympanic    SpO2: 96%    Weight: (!) 143 kg (316 lb)    Height: 5' 8" (1 727 m)      Body mass index is 48 05 kg/m²  Physical Exam   Constitutional: She is oriented to person, place, and time  She appears well-developed and well-nourished  HENT:   Head: Normocephalic and atraumatic  Mouth/Throat: No oropharyngeal exudate  Eyes: Pupils are equal, round, and reactive to light  Conjunctivae and EOM are normal    Neck: Normal range of motion  No JVD present  No tracheal deviation present  No thyromegaly present     Cardiovascular: Normal rate, regular rhythm, normal heart sounds and intact distal pulses  Exam reveals no gallop and no friction rub  No murmur heard  Pulmonary/Chest: Effort normal and breath sounds normal  No stridor  No respiratory distress  She has no wheezes  She has no rales  She exhibits no tenderness  Abdominal: Soft  Bowel sounds are normal  She exhibits no distension and no mass  There is no tenderness  There is no rebound and no guarding  Musculoskeletal: Normal range of motion  She exhibits no edema, tenderness or deformity  Lymphadenopathy:     She has no cervical adenopathy  Neurological: She is alert and oriented to person, place, and time  She has normal reflexes  She displays normal reflexes  No cranial nerve deficit  She exhibits normal muscle tone  Coordination normal    Skin: Skin is warm and dry  No visits with results within 2 Month(s) from this visit     Latest known visit with results is:   Office Visit on 03/17/2020   Component Date Value    HIV Screen 4th Generatio* 03/20/2020 Non Reactive     White Blood Cell Count 03/20/2020 7 7     Red Blood Cell Count 03/20/2020 4 81     Hemoglobin 03/20/2020 13 3     HCT 03/20/2020 39 2     MCV 03/20/2020 82     MCH 03/20/2020 27 7     MCHC 03/20/2020 33 9     RDW 03/20/2020 15 1     Platelet Count 36/61/1996 270     Neutrophils 03/20/2020 74     Lymphocytes 03/20/2020 19     Monocytes 03/20/2020 6     Eosinophils 03/20/2020 1     Basophils PCT 03/20/2020 0     Neutrophils (Absolute) 03/20/2020 5 6     Lymphocytes (Absolute) 03/20/2020 1 5     Monocytes (Absolute) 03/20/2020 0 5     Eosinophils (Absolute) 03/20/2020 0 1     Basophils ABS 03/20/2020 0 0     Immature Granulocytes 03/20/2020 0     Immature Granulocytes (A* 03/20/2020 0 0     Glucose, Random 03/20/2020 109*    BUN 03/20/2020 13     Creatinine 03/20/2020 0 66     eGFR Non African American 03/20/2020 110     eGFR  03/20/2020 127     SL AMB BUN/CREATININE RA* 03/20/2020 20     Sodium 03/20/2020 137     Potassium 03/20/2020 4 0     Chloride 03/20/2020 102     CO2 03/20/2020 22     CALCIUM 03/20/2020 9 0     Protein, Total 03/20/2020 6 6     Albumin 03/20/2020 4 3     Globulin, Total 03/20/2020 2 3     Albumin/Globulin Ratio 03/20/2020 1 9     TOTAL BILIRUBIN 03/20/2020 0 4     Alk Phos Isoenzymes 03/20/2020 90     AST 03/20/2020 12     ALT 03/20/2020 12     LDL Direct 03/20/2020 150*    Cholesterol, Total 03/20/2020 229*    Triglycerides 03/20/2020 219*    HDL 03/20/2020 45     VLDL Cholesterol Calcula* 03/20/2020 44*    LDL Calculated 03/20/2020 140*    T   Chol/HDL Ratio 03/20/2020 5 1*    Free t4 03/20/2020 1 08     TSH 03/20/2020 3 200     Specific Gravity 03/20/2020 1 019     Ph 03/20/2020 7 0     Color UA 03/20/2020 Yellow     Urine Appearance 03/20/2020 Clear     Leukocyte Esterase 03/20/2020 Negative     Protein 03/20/2020 Negative     Glucose, 24 HR Urine 03/20/2020 Negative     Ketone, Urine 03/20/2020 Negative     Blood, Urine 03/20/2020 Negative     Bilirubin, Urine 03/20/2020 Negative     Urobilinogen Urine 03/20/2020 0 2     SL AMB NITRITES URINE, Q* 03/20/2020 Negative     Microscopic Examination 03/20/2020 Comment

## 2020-07-20 NOTE — ASSESSMENT & PLAN NOTE
The patient continues to do well on the duloxetine  We have made no changes today  The patient will continue with her current medication

## 2020-07-20 NOTE — ASSESSMENT & PLAN NOTE
The patient's anxiety symptoms are well controlled with the current dose of her duloxetine  We have made no changes today  We will see her back as scheduled

## 2020-07-23 ENCOUNTER — ANNUAL EXAM (OUTPATIENT)
Dept: OBGYN CLINIC | Facility: CLINIC | Age: 40
End: 2020-07-23
Payer: COMMERCIAL

## 2020-07-23 VITALS
DIASTOLIC BLOOD PRESSURE: 84 MMHG | SYSTOLIC BLOOD PRESSURE: 130 MMHG | BODY MASS INDEX: 44.41 KG/M2 | TEMPERATURE: 97.6 F | WEIGHT: 293 LBS | HEIGHT: 68 IN

## 2020-07-23 DIAGNOSIS — Z01.419 WOMEN'S ANNUAL ROUTINE GYNECOLOGICAL EXAMINATION: Primary | ICD-10-CM

## 2020-07-23 DIAGNOSIS — Z12.31 ENCOUNTER FOR SCREENING MAMMOGRAM FOR MALIGNANT NEOPLASM OF BREAST: ICD-10-CM

## 2020-07-23 PROCEDURE — 3008F BODY MASS INDEX DOCD: CPT | Performed by: OBSTETRICS & GYNECOLOGY

## 2020-07-23 PROCEDURE — 99396 PREV VISIT EST AGE 40-64: CPT | Performed by: OBSTETRICS & GYNECOLOGY

## 2020-07-23 NOTE — PROGRESS NOTES
Assessment/Plan   Diagnoses and all orders for this visit:    Women's annual routine gynecological examination    Encounter for screening mammogram for malignant neoplasm of breast  -     Mammo screening bilateral w cad; Future    1  Yearly exam-Pap smear deferred, self-breast awareness reviewed, calcium/vitamin-D recommendations discussed, mammogram request given,  2  Status post laparotomy with supracervical hysterectomy-761 g uterus with fibroids was noted at surgery 6/18/19  She has healed well  She is very happy  She will call with any issues  3  Previous anemia-has resolved since hysterectomy  Most recent hemoglobin was 13 3 in March 2020  4  Previous history of Mirena IUD/pelvic cramping/ left adnexal tenderness/irregular bleeding-all resolved since surgery  5  Vaginal dryness with sex-patient is using lubrication with okay results  She was given the vaginal lubrication/moisturizer sheet  Suggested moisturizer such as Replens or Luvena or lubrigyn twice weekly as baseline and then lubrication with sex  She will try this  To follow-up 1 year for yearly exam or as needed  Subjective   Patient ID: Nikia Gonzalez is a 41 y o  female  Vitals:    07/23/20 1126   BP: 130/84   Temp: 97 6 °F (36 4 °C)     Patient was seen today for yearly exam   Please see assessment plan for details        The following portions of the patient's history were reviewed and updated as appropriate: allergies, current medications, past family history, past medical history, past social history, past surgical history and problem list   Past Medical History:   Diagnosis Date    Allergic rhinitis     Anxiety     Depression     Family health problem     mother and grandmother h/o blood clots after surgery    Fibroid     Fullness in ear, left     Resolved [id number]    Hypertension     Resolved [id number]    Urinary tract infection     h/o     Past Surgical History:   Procedure Laterality Date    CHOLECYSTECTOMY      IA TOTAL ABDOM HYSTERECTOMY N/A 2019    Procedure: Supracervical IWONA, removal of bilat tubes, removal of IUD, cystoscopy;  Surgeon: Doris Veronica MD;  Location: [facility];  Service: Gynecology    TOOTH EXTRACTION       OB History    Para Term  AB Living   0 0 0 0 0 0   SAB TAB Ectopic Multiple Live Births   0 0 0 0 0       Current Outpatient Medications:     Cetirizine HCl (ZYRTEC ALLERGY) 10 MG CAPS, Take 1 capsule by mouth daily as needed , Disp: , Rfl:     DULoxetine (CYMBALTA) 60 mg delayed release capsule, Take 1 capsule (60 mg total) by mouth daily, Disp: 90 capsule, Rfl: 1    montelukast (SINGULAIR) 10 mg tablet, Take 1 tablet (10 mg total) by mouth daily at bedtime, Disp: 30 tablet, Rfl: 11    Azelastine HCl 137 MCG/SPRAY SOLN, 1-2 puffs each nostril twice a day when necessary nasal congestion (Patient not taking: Reported on 2020), Disp: 1 Bottle, Rfl: 11    B-D ALLERGY SYRINGE 1CC/28G 28G X 1/2" 1 ML MISC, , Disp: , Rfl:   No Known Allergies  Social History     Socioeconomic History    Marital status: /Civil Union     Spouse name: None    Number of children: None    Years of education: None    Highest education level: None   Occupational History    None   Social Needs    Financial resource strain: Not hard at all   Lexy-Keiry insecurity:     Worry: Never true     Inability: Never true    Transportation needs:     Medical: No     Non-medical: No   Tobacco Use    Smoking status: Never Smoker    Smokeless tobacco: Never Used   Substance and Sexual Activity    Alcohol use: Yes     Frequency: Monthly or less     Drinks per session: 1 or 2     Binge frequency: Never    Drug use: No    Sexual activity: Yes     Partners: Male     Birth control/protection: Female Sterilization   Lifestyle    Physical activity:     Days per week: 0 days     Minutes per session: 0 min    Stress:  To some extent   Relationships    Social connections:     Talks on phone: More than three times a week     Gets together: Once a week     Attends Spiritism service: More than 4 times per year     Active member of club or organization: Yes     Attends meetings of clubs or organizations: More than 4 times per year     Relationship status:     Intimate partner violence:     Fear of current or ex partner: No     Emotionally abused: No     Physically abused: No     Forced sexual activity: No   Other Topics Concern    None   Social History Narrative    Always uses seat belt    Caffeine use    [email or web address])     Family History   Problem Relation Age of Onset    Anxiety disorder Mother     Other Mother         Blood clots    Hypertension Mother     Alcohol abuse Father     Hypertension Brother     Anxiety disorder Maternal Grandmother     Other Maternal Grandmother         Blood clots    Lung cancer Maternal Grandfather     Heart disease Family         cardiac disorder    Cancer Family         lung ca    Diabetes Family     Cancer Family     Ovarian cancer Maternal Aunt     Alcohol abuse Maternal Aunt 72    Prostate cancer Maternal Uncle        Review of Systems   Constitutional: Negative for chills, diaphoresis, fatigue and fever  Respiratory: Negative for apnea, cough, chest tightness, shortness of breath and wheezing  Cardiovascular: Negative for chest pain, palpitations and leg swelling  Gastrointestinal: Negative for abdominal distention, abdominal pain, anal bleeding, constipation, diarrhea, nausea, rectal pain and vomiting  Genitourinary: Negative for difficulty urinating, dyspareunia, dysuria, frequency, hematuria, menstrual problem, pelvic pain, urgency, vaginal bleeding, vaginal discharge and vaginal pain  Musculoskeletal: Negative for arthralgias, back pain and myalgias  Skin: Negative for color change and rash  Neurological: Negative for dizziness, syncope, light-headedness, numbness and headaches  Hematological: Negative for adenopathy   Does not bruise/bleed easily  Psychiatric/Behavioral: Negative for dysphoric mood and sleep disturbance  The patient is not nervous/anxious  Objective   Physical Exam    Objective      /84 (BP Location: Left arm, Patient Position: Sitting, Cuff Size: Large)   Temp 97 6 °F (36 4 °C)   Ht 5' 8" (1 727 m)   Wt (!) 144 kg (317 lb)   LMP 05/15/2019 (Approximate)   BMI 48 20 kg/m²     General:   alert and oriented, in no acute distress   Neck: normal to inspection and palpation   Breast: normal appearance, no masses or tenderness   Heart:    Lungs:    Abdomen: soft, non-tender, without masses or organomegaly   Vulva: normal   Vagina: Without erythema or lesions or discharge  Normal   Cervix: Without lesions or discharge or cervicitis    No Cervical motion tenderness   Uterus: surgically absent   Adnexa: no mass, fullness, tenderness   Rectum: negative    Psych:  Normal mood and affect   Skin:  Without obvious lesions   Eyes: symmetric, with normal movements and reactivity   Musculoskeletal:  Normal muscle tone and movements appreciated

## 2020-08-10 ENCOUNTER — HOSPITAL ENCOUNTER (OUTPATIENT)
Dept: MAMMOGRAPHY | Facility: CLINIC | Age: 40
Discharge: HOME | End: 2020-08-10
Payer: COMMERCIAL

## 2020-08-10 DIAGNOSIS — Z12.39 SCREENING FOR MALIGNANT NEOPLASM OF BREAST: ICD-10-CM

## 2020-08-10 DIAGNOSIS — Z12.31 ENCOUNTER FOR SCREENING MAMMOGRAM FOR MALIGNANT NEOPLASM OF BREAST: ICD-10-CM

## 2020-08-10 PROCEDURE — 77067 SCR MAMMO BI INCL CAD: CPT

## 2020-08-10 PROCEDURE — 77063 BREAST TOMOSYNTHESIS BI: CPT

## 2020-08-13 ENCOUNTER — HOSPITAL ENCOUNTER (OUTPATIENT)
Dept: MAMMOGRAPHY | Facility: CLINIC | Age: 40
Discharge: HOME | End: 2020-08-13
Payer: COMMERCIAL

## 2020-08-13 DIAGNOSIS — R92.8 ABNORMAL MAMMOGRAM: ICD-10-CM

## 2020-08-13 PROCEDURE — 76642 ULTRASOUND BREAST LIMITED: CPT

## 2020-09-07 ENCOUNTER — TELEPHONE (OUTPATIENT)
Dept: OTHER | Facility: OTHER | Age: 40
End: 2020-09-07

## 2020-09-07 NOTE — TELEPHONE ENCOUNTER
Patient called in requesting a medication not listed   She stated she will call the office tomorrow to request

## 2020-09-10 DIAGNOSIS — F41.1 GENERALIZED ANXIETY DISORDER: Primary | ICD-10-CM

## 2020-09-11 RX ORDER — LORAZEPAM 0.5 MG/1
0.5 TABLET ORAL DAILY
Qty: 30 TABLET | Refills: 0 | Status: SHIPPED | OUTPATIENT
Start: 2020-09-11 | End: 2021-01-18 | Stop reason: SDUPTHER

## 2020-10-04 DIAGNOSIS — F32.A DEPRESSION, UNSPECIFIED DEPRESSION TYPE: ICD-10-CM

## 2020-10-04 DIAGNOSIS — F41.1 GENERALIZED ANXIETY DISORDER: ICD-10-CM

## 2020-10-05 ENCOUNTER — OFFICE VISIT (OUTPATIENT)
Dept: URGENT CARE | Facility: CLINIC | Age: 40
End: 2020-10-05
Payer: COMMERCIAL

## 2020-10-05 ENCOUNTER — APPOINTMENT (OUTPATIENT)
Dept: RADIOLOGY | Facility: CLINIC | Age: 40
End: 2020-10-05
Payer: COMMERCIAL

## 2020-10-05 VITALS
OXYGEN SATURATION: 98 % | HEART RATE: 93 BPM | TEMPERATURE: 98.8 F | BODY MASS INDEX: 44.41 KG/M2 | RESPIRATION RATE: 16 BRPM | WEIGHT: 293 LBS | HEIGHT: 68 IN

## 2020-10-05 DIAGNOSIS — M54.40 BACK PAIN OF LUMBAR REGION WITH SCIATICA: Primary | ICD-10-CM
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
## 2020-10-05 DIAGNOSIS — M54.40 BACK PAIN OF LUMBAR REGION WITH SCIATICA: ICD-10-CM

## 2020-10-05 PROCEDURE — G0382 LEV 3 HOSP TYPE B ED VISIT: HCPCS | Performed by: FAMILY MEDICINE

## 2020-10-05 PROCEDURE — 72100 X-RAY EXAM L-S SPINE 2/3 VWS: CPT

## 2020-10-05 RX ORDER — CYCLOBENZAPRINE HCL 5 MG
5 TABLET ORAL 3 TIMES DAILY PRN
Qty: 30 TABLET | Refills: 0 | Status: SHIPPED | OUTPATIENT
Start: 2020-10-05 | End: 2021-01-18 | Stop reason: ALTCHOICE

## 2020-10-05 RX ORDER — DULOXETIN HYDROCHLORIDE 60 MG/1
CAPSULE, DELAYED RELEASE ORAL
Qty: 90 CAPSULE | Refills: 1 | OUTPATIENT
Start: 2020-10-05

## 2020-10-14 DIAGNOSIS — F41.1 GENERALIZED ANXIETY DISORDER: ICD-10-CM

## 2020-10-14 DIAGNOSIS — F32.A DEPRESSION, UNSPECIFIED DEPRESSION TYPE: ICD-10-CM

## 2020-10-14 RX ORDER — DULOXETIN HYDROCHLORIDE 60 MG/1
60 CAPSULE, DELAYED RELEASE ORAL DAILY
Qty: 90 CAPSULE | Refills: 1 | Status: SHIPPED | OUTPATIENT
Start: 2020-10-14 | End: 2021-01-18 | Stop reason: SDUPTHER

## 2021-01-18 ENCOUNTER — OFFICE VISIT (OUTPATIENT)
Dept: FAMILY MEDICINE CLINIC | Facility: CLINIC | Age: 41
End: 2021-01-18
Payer: COMMERCIAL

## 2021-01-18 VITALS
BODY MASS INDEX: 44.41 KG/M2 | HEART RATE: 68 BPM | DIASTOLIC BLOOD PRESSURE: 82 MMHG | TEMPERATURE: 98.5 F | HEIGHT: 68 IN | SYSTOLIC BLOOD PRESSURE: 134 MMHG | WEIGHT: 293 LBS

## 2021-01-18 DIAGNOSIS — E66.01 CLASS 3 SEVERE OBESITY DUE TO EXCESS CALORIES WITHOUT SERIOUS COMORBIDITY WITH BODY MASS INDEX (BMI) OF 45.0 TO 49.9 IN ADULT (HCC): ICD-10-CM

## 2021-01-18 DIAGNOSIS — F32.A DEPRESSION, UNSPECIFIED DEPRESSION TYPE: Primary | ICD-10-CM

## 2021-01-18 DIAGNOSIS — F41.1 GENERALIZED ANXIETY DISORDER: ICD-10-CM

## 2021-01-18 PROCEDURE — 3725F SCREEN DEPRESSION PERFORMED: CPT | Performed by: FAMILY MEDICINE

## 2021-01-18 PROCEDURE — 1036F TOBACCO NON-USER: CPT | Performed by: FAMILY MEDICINE

## 2021-01-18 PROCEDURE — 3008F BODY MASS INDEX DOCD: CPT | Performed by: FAMILY MEDICINE

## 2021-01-18 PROCEDURE — 99214 OFFICE O/P EST MOD 30 MIN: CPT | Performed by: FAMILY MEDICINE

## 2021-01-18 RX ORDER — DULOXETIN HYDROCHLORIDE 60 MG/1
60 CAPSULE, DELAYED RELEASE ORAL DAILY
Qty: 90 CAPSULE | Refills: 1 | Status: SHIPPED | OUTPATIENT
Start: 2021-01-18 | End: 2021-04-20 | Stop reason: SDUPTHER

## 2021-01-18 RX ORDER — PHENOL 1.4 %
600 AEROSOL, SPRAY (ML) MUCOUS MEMBRANE DAILY
COMMUNITY

## 2021-01-18 RX ORDER — LORAZEPAM 0.5 MG/1
0.5 TABLET ORAL DAILY
Qty: 30 TABLET | Refills: 0 | Status: SHIPPED | OUTPATIENT
Start: 2021-01-18 | End: 2021-04-20 | Stop reason: SDUPTHER

## 2021-01-18 NOTE — ASSESSMENT & PLAN NOTE
The patient is stable on the current dose of her Cymbalta  We have made no changes today  She will call with any worsening symptoms  We will see her back as scheduled  She will go for the up-to-date lab work as ordered

## 2021-01-18 NOTE — ASSESSMENT & PLAN NOTE
I advised her to start weighing herself daily to track her weight  The patient was advised to start eating 7 non starchy vegetables and 3 fruits every day as well as 10-12 nuts per day  She was also advised to add on psyllium fiber 1 tbsp twice a day for goal of 13 g per day  She was advised to drink 16 oz of water before all meals and to avoid any artificially sweetened drinks  She was also advised to try high intensity interval training for 4 minutes per day along with resistance exercises  I also advised her to keep a food diary to track everything that she is eating in the course of the day  At meals, she should always cut his dish in  half and eat half her meal and then determine if she is still hungry prior to eating the additional half  We will see her back in the office as scheduled  I am also going to refer her to Nutrition Services for further instruction on her diet  I am going to refer her to our weight management program for more instructions on weight loss and behavioral therapy

## 2021-01-18 NOTE — PATIENT INSTRUCTIONS
We are committed to getting you vaccinated as soon as possible and will be closely following CDC and [facility]  guidelines as they are released and revised  Please refer to our COVID-19 vaccine webpage for the most up to date information on the vaccine and our distribution efforts

## 2021-01-18 NOTE — PROGRESS NOTES
Assessment/Plan:  Problem List Items Addressed This Visit        Other    Class 3 severe obesity due to excess calories without serious comorbidity with body mass index (BMI) of 45 0 to 49 9 in adult [facility])     I advised her to start weighing herself daily to track her weight  The patient was advised to start eating 7 non starchy vegetables and 3 fruits every day as well as 10-12 nuts per day  She was also advised to add on psyllium fiber 1 tbsp twice a day for goal of 13 g per day  She was advised to drink 16 oz of water before all meals and to avoid any artificially sweetened drinks  She was also advised to try high intensity interval training for 4 minutes per day along with resistance exercises  I also advised her to keep a food diary to track everything that she is eating in the course of the day  At meals, she should always cut his dish in  half and eat half her meal and then determine if she is still hungry prior to eating the additional half  We will see her back in the office as scheduled  I am also going to refer her to Nutrition Services for further instruction on her diet  I am going to refer her to our weight management program for more instructions on weight loss and behavioral therapy  Relevant Orders    CBC and differential    Comprehensive metabolic panel    LDL cholesterol, direct    Lipid panel    TSH, 3rd generation with Free T4 reflex    UA (URINE) with reflex to Scope    Ambulatory referral to Weight Management    Depression - Primary     The patient is stable on the current dose of her Cymbalta  We have made no changes today  She will call with any worsening symptoms  We will see her back as scheduled  She will go for the up-to-date lab work as ordered           Relevant Medications    DULoxetine (CYMBALTA) 60 mg delayed release capsule    LORazepam (ATIVAN) 0 5 mg tablet    Other Relevant Orders    CBC and differential    Comprehensive metabolic panel    LDL cholesterol, direct    Lipid panel    TSH, 3rd generation with Free T4 reflex    UA (URINE) with reflex to Scope    Generalized anxiety disorder    Relevant Medications    DULoxetine (CYMBALTA) 60 mg delayed release capsule    LORazepam (ATIVAN) 0 5 mg tablet    Other Relevant Orders    CBC and differential    Comprehensive metabolic panel    LDL cholesterol, direct    Lipid panel    TSH, 3rd generation with Free T4 reflex    UA (URINE) with reflex to Scope          Return in about 3 months (around 4/18/2021) for Recheck  Subjective:   Chief Complaint   Patient presents with    Follow-up     6 month checkup        Patient ID: Angelica iDez is a 41 y o  female presents today for a routine followup  Angelica Diez is a 41 y o  female presents today for follow-up of her depression and anxiety  She continues to take the duloxetine as prescribed  She is under a lot of stress  She has been trying to track her weight  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  She is doing well on the duloxetine- she is not as depressed  She is stressed at work- it is hard to keep up with  The patient denies any chest pain, shortness of breath, or palpitations  There is no edema  There are no headaches or visual changes  There is no lightheadedness, dizziness, or fainting spells  The patient currently denies any nausea, vomiting, or GERD symptoms  she has normal bowel movements and normal urine output  she has a normal appetite  She is stable  She is getting some hot flashes  Anxiety  Presents for follow-up visit  Symptoms include excessive worry and nervous/anxious behavior  Patient reports no chest pain, compulsions, confusion, decreased concentration, depressed mood, dizziness, dry mouth, feeling of choking, hyperventilation, impotence, insomnia, irritability, malaise, muscle tension, nausea, obsessions, palpitations, panic, restlessness, shortness of breath or suicidal ideas  Symptoms occur constantly  The following portions of the patient's history were reviewed and updated as appropriate: allergies, current medications, past family history, past medical history, past social history, past surgical history and problem list   Patient Active Problem List   Diagnosis    Female pelvic pain    Depression    Generalized anxiety disorder    S/P abdominal supracervical subtotal hysterectomy    Allergic rhinitis    Disturbance in sleep behavior    Class 3 severe obesity due to excess calories without serious comorbidity with body mass index (BMI) of 45 0 to 49 9 in adult [facility])     Past Medical History:   Diagnosis Date    Allergic rhinitis     Anxiety     Depression     Family health problem     mother and grandmother h/o blood clots after surgery    Fibroid     Fullness in ear, left     Resolved [id number]    Hypertension     Resolved [id number]    Urinary tract infection     h/o     Past Surgical History:   Procedure Laterality Date    CHOLECYSTECTOMY      KS TOTAL ABDOM HYSTERECTOMY N/A 6/18/2019    Procedure: Supracervical IWONA, removal of bilat tubes, removal of IUD, cystoscopy;  Surgeon: Shaylee Qiu MD;  Location: [facility] OR;  Service: Gynecology    TOOTH EXTRACTION       No Known Allergies  Family History   Problem Relation Age of Onset    Anxiety disorder Mother     Other Mother         Blood clots    Hypertension Mother     Alcohol abuse Father     Hypertension Brother     Anxiety disorder Maternal Grandmother     Other Maternal Grandmother         Blood clots    Lung cancer Maternal Grandfather     Heart disease Family         cardiac disorder    Cancer Family         lung ca    Diabetes Family     Cancer Family     Ovarian cancer Maternal Aunt     Alcohol abuse Maternal Aunt 72    Prostate cancer Maternal Uncle      Social History     Socioeconomic History    Marital status: /Civil Union     Spouse name: Not on file    Number of children: Not on file    Years of education: Not on file    Highest education level: Not on file   Occupational History    Not on file   Social Needs    Financial resource strain: Not hard at all    Food insecurity     Worry: Never true     Inability: Never true   [employer] needs     Medical: No     Non-medical: No   Tobacco Use    Smoking status: Never Smoker    Smokeless tobacco: Never Used   Substance and Sexual Activity    Alcohol use: Yes     Frequency: Monthly or less     Drinks per session: 1 or 2     Binge frequency: Never    Drug use: No    Sexual activity: Yes     Partners: Male     Birth control/protection: Female Sterilization   Lifestyle    Physical activity     Days per week: 0 days     Minutes per session: 0 min    Stress:  To some extent   Relationships    Social connections     Talks on phone: More than three times a week     Gets together: Once a week     Attends Scientologist service: More than 4 times per year     Active member of club or organization: Yes     Attends meetings of clubs or organizations: More than 4 times per year     Relationship status:     Intimate partner violence     Fear of current or ex partner: No     Emotionally abused: No     Physically abused: No     Forced sexual activity: No   Other Topics Concern    Not on file   Social History Narrative    Always uses seat belt    Caffeine use    Mandaen [employer] ([employer])     Current Outpatient Medications on File Prior to Visit   Medication Sig Dispense Refill    Azelastine HCl 137 MCG/SPRAY SOLN 1-2 puffs each nostril twice a day when necessary nasal congestion 1 Bottle 11    B-D ALLERGY SYRINGE 1CC/28G 28G X 1/2" 1 ML MISC       calcium carbonate (OS-MAUREEN) 600 MG tablet Take 600 mg by mouth daily      Cetirizine HCl (ZYRTEC ALLERGY) 10 MG CAPS Take 1 capsule by mouth daily as needed       montelukast (SINGULAIR) 10 mg tablet Take 1 tablet (10 mg total) by mouth daily at bedtime 30 tablet 11    Tuberculin-Allergy Syringes 28G X 1/2" 0 5 ML MISC Inject under the skin every 30 (thirty) days 50 each 0    [DISCONTINUED] DULoxetine (CYMBALTA) 60 mg delayed release capsule Take 1 capsule (60 mg total) by mouth daily 90 capsule 1    [DISCONTINUED] LORazepam (ATIVAN) 0 5 mg tablet Take 1 tablet (0 5 mg total) by mouth daily 30 tablet 0    [DISCONTINUED] cyclobenzaprine (FLEXERIL) 5 mg tablet Take 1 tablet (5 mg total) by mouth 3 (three) times a day as needed for muscle spasms for up to 10 days (Patient not taking: Reported on 1/18/2021) 30 tablet 0     No current facility-administered medications on file prior to visit  Review of Systems   Constitutional: Negative  Negative for irritability  HENT: Negative  Eyes: Negative  Respiratory: Negative  Negative for shortness of breath  Cardiovascular: Negative  Negative for chest pain and palpitations  Gastrointestinal: Negative  Negative for nausea  Endocrine: Negative  Genitourinary: Negative  Negative for impotence  Musculoskeletal: Negative  Skin: Negative  Allergic/Immunologic: Negative  Neurological: Negative  Negative for dizziness  Hematological: Negative  Psychiatric/Behavioral: Negative for confusion, decreased concentration and suicidal ideas  The patient is nervous/anxious  The patient does not have insomnia  Objective:  Vitals:    01/18/21 0904 01/18/21 0947   BP: 146/100 134/82   BP Location: Left arm    Patient Position: Sitting    Cuff Size: Large    Pulse: 78 68   Temp: 98 5 °F (36 9 °C)    TempSrc: Tympanic    Weight: (!) 151 kg (332 lb)    Height: 5' 8" (1 727 m)      Body mass index is 50 48 kg/m²  Physical Exam  Constitutional:       Appearance: She is well-developed  HENT:      Head: Normocephalic and atraumatic  Mouth/Throat:      Pharynx: No oropharyngeal exudate  Eyes:      Conjunctiva/sclera: Conjunctivae normal       Pupils: Pupils are equal, round, and reactive to light     Neck:      Musculoskeletal: Normal range of motion  Thyroid: No thyromegaly  Vascular: No JVD  Trachea: No tracheal deviation  Cardiovascular:      Rate and Rhythm: Normal rate and regular rhythm  Heart sounds: Normal heart sounds  No murmur  No friction rub  No gallop  Pulmonary:      Effort: Pulmonary effort is normal  No respiratory distress  Breath sounds: Normal breath sounds  No stridor  No wheezing or rales  Chest:      Chest wall: No tenderness  Abdominal:      General: Bowel sounds are normal  There is no distension  Palpations: Abdomen is soft  There is no mass  Tenderness: There is no abdominal tenderness  There is no guarding or rebound  Musculoskeletal: Normal range of motion  General: No tenderness or deformity  Lymphadenopathy:      Cervical: No cervical adenopathy  Skin:     General: Skin is warm and dry  Neurological:      Mental Status: She is alert and oriented to person, place, and time  Cranial Nerves: No cranial nerve deficit  Motor: No abnormal muscle tone  Coordination: Coordination normal       Deep Tendon Reflexes: Reflexes are normal and symmetric  Reflexes normal            BMI Counseling: Body mass index is 50 48 kg/m²  The BMI is above normal  Nutrition recommendations include decreasing portion sizes, encouraging healthy choices of fruits and vegetables, decreasing fast food intake, consuming healthier snacks, limiting drinks that contain sugar, moderation in carbohydrate intake, increasing intake of lean protein, reducing intake of saturated and trans fat and reducing intake of cholesterol  Exercise recommendations include exercising 3-5 times per week and strength training exercises  No pharmacotherapy was ordered  Patient referred to PCP due to patient being overweight

## 2021-02-10 ENCOUNTER — OFFICE VISIT (OUTPATIENT)
Dept: BARIATRICS | Facility: CLINIC | Age: 41
End: 2021-02-10

## 2021-02-10 DIAGNOSIS — R63.5 ABNORMAL WEIGHT GAIN: ICD-10-CM

## 2021-02-10 PROCEDURE — RECHECK

## 2021-02-10 PROCEDURE — WMDI30

## 2021-02-10 NOTE — PROGRESS NOTES
Name was verified by patient stating name? Yes   verified by patient stating? Yes  Verified the patient is alone? Yes  Offered patient a live visit but are now consenting to this virtual visit? Yes  Verified with the patient that she will be contacted to collect payment? 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  Reports her  is a grazer and she finds herself getting into this habit as well  Long stretch between b/l on certain days due to teaching  Not able to have a snack during this time but would be able to drink  Suggest premier protein to help her through that stretch  Some suggestions provided for increasing protein and decreasing calories  Reports that sides tend to be a problem and she appears to be consuming at least 1 cup carb at meal  Also reports excess calories via creamer  Discussed measuring to get an idea of where she is currently and then reducing by 1 tbsp in each travel mug  She has food logged in the past and had success with Lose It vijaya  Encouraged to resume this  Meal plan and other resources to be provided via email  She will f/u in 3 wks       Patient seen by Medical Provider in past 6 months:  no  Requested to schedule appointment with Medical Provider: No    Anthropometric Measurements  Start Weight (#): 332 lbs as per chart from 21; no current self reported weight   Ideal Body Weight (#): 140 lbs (68" self reported)  Goal Weight (#):  lbs  Highest: 332 lbs  Lowest: unknown     Weight Loss History  Previous weight loss attempts: Commercial Programs ([employer], Willard Andrew, etc )    Food and Nutrition Related History  Wake up: 6-6:30   Bed Time: 9:00    Food Recall  Breakfast: 7:30: instant oatmeal, 2% milk 2 packs, OR 3 eggs, 3 pieces 647, OR sausage colton ~80 lillie, english muffin, 1 egg    Snack: skip  Lunch:11:15 or 1:20: turkey & cheese s/w on 647 bread w/gonzales & individual bags of chips OR canned soup OR leftovers OR lunchable OR Rwandan bread pizza   Snack: 3:00 might be grazing; cookies or fruit roll up    Dinner: 5-6:00: ?4 oz protein, larger portion potatoes, more veggies in the summer  Snack: 7:00: few oreo cookies or ice cream    Beverages: water and coffee/tea  Volume of beverage intake: a lot of water, 2 travel mugs coffee w/a lot of coffeemate Rwandan vanilla creamer     Weekends: Same  Cravings: chocolate  Trouble area of day: evening    Frequency of Eating out:  on the w/e  Food restrictions: n/a  Cooking: self   Food Shopping: self    Physical Activity Intake  Activity:feeding barn animals and laying hay  Frequency:daily  Physical limitations/barriers to exercise: n/a    Estimated Needs  Energy  [employer] Needs: BMR : 2214   1-2# loss weekly sedentary:  4367-8918            1-2# loss weekly lightly active: 8185-8211  Maintenance calories for sedentary activity level: 2657  Protein: 76-95 gm     (1 2-1 5g/kg IBW)  Fluid: 74 oz   (35mL/kg IBW)    Nutrition Diagnosis  Yes;     Overweight/obesity  related to Excess energy intake as evidenced by  BMI more than normative standard for age and sex (obesity-grade III 36+)       Nutrition Intervention    Nutrition Prescription  Calories: 0874-6120  Protein: >85 gm    Meal Plan (Bennie/Pro)  Breakfast: 300-400, 20-30  Snack: 0-160, 0-30  Lunch: 350-450, 28  Snack: 150-200, 5-10  Dinner: 400-450, 28-42   Snack: 100-150, 5-10    Nutrition Education:    Calorie controlled menu  Lean protein food choices  Healthy snack options  Food journaling tips      Nutrition Counseling:  Strategies: meal planning, portion sizes, healthy snack choices, hydration, fiber intake, protein intake, exercise, food journal      Monitoring and Evaluation:  Evaluation criteria:  Energy Intake  Meet protein needs  Maintain adequate hydration  Monitor weekly weight  Meal planning/preparation  Food journal   Decreased portions at mealtimes and snacks  Physical activity     Barriers to learning:none  Readiness to change: Preparation:  (Getting ready to change)   Comprehension: very good  Expected Compliance: very good

## 2021-03-01 ENCOUNTER — OFFICE VISIT (OUTPATIENT)
Dept: BARIATRICS | Facility: CLINIC | Age: 41
End: 2021-03-01

## 2021-03-01 VITALS — BODY MASS INDEX: 44.41 KG/M2 | HEIGHT: 68 IN | WEIGHT: 293 LBS

## 2021-03-01 DIAGNOSIS — R63.5 ABNORMAL WEIGHT GAIN: ICD-10-CM

## 2021-03-01 PROCEDURE — 3008F BODY MASS INDEX DOCD: CPT | Performed by: FAMILY MEDICINE

## 2021-03-01 PROCEDURE — DB6PK

## 2021-03-01 PROCEDURE — RECHECK

## 2021-03-01 NOTE — PROGRESS NOTES
Weight Management Medical Nutrition Assessment  Stevenson Rhoades is present for 1 of 6 bundle  Seen virtually 3 wks ago  Current wt: 330 6 lbs  Loss of ~7 lbs x 3 wks using home scale (our scales do seem to be very close)  Has been logging and weighing foods with Lose It vijaya  Avg ~9470-1690 calories/day  Feels body is adjusting and not having too much hunger  Some concerns regarding going back to school vs online  Grazing has been better with structured snack in the afternoon  Found she was consuming 1/3 cup of creamer x 2 and has now reduced to 3 tbsp each  Sometimes feels hungry in the evening after her snack, discussed options she could add here  She will f/u in 1 month       Patient seen by Medical Provider in past 6 months:  no  Requested to schedule appointment with Medical Provider: No    Anthropometric Measurements  Start Weight (#): 332 lbs as per chart from 1/18/21; 335 lbs 2/10/21   Current wt: 330 6 lbs 3/1/21  Ideal Body Weight (#): 137 5 lbs    Goal Weight (#):  lbs  Highest: 332 lbs  Lowest: unknown     Weight Loss History  Previous weight loss attempts: Commercial Programs ([employer], Froy Hodges, etc )    Food and Nutrition Related History  Wake up: 6-6:30   Bed Time: 9:00    Food Recall  Breakfast: 7:30: 1/2 cup whites, 1 egg, laughing cow cheese wedge, 1 piece 647 or 647 muffin, OR cheese, Ethiopian mehta, english muffin, 1 egg, 1/2 banana   Snack: ~11:00 fruit OR Premier Protein or Atkins shake  Lunch: 1:20: 2 oz turkey & 1 cheese s/w on 647 bread w/gonzales & triscuits, cheese or goldfish, sometimes cottage cheese  Snack: 3:00 cottage cheese/fruit or yogurt or nuts    Dinner: 5-6:00: 4 oz protein,  larger portions veggies OR turkey meatballs, 647 bun, side salad, 1 portion french fries  Snack: 7:00: greek yogurt    Beverages: water and coffee/tea  Volume of beverage intake: a lot of water, 2 travel mugs coffee w/3 tbsp FFcoffeemate french vanilla creamer     Weekends: Same  Cravings: chocolate  Trouble area of day: evening    Frequency of Eating out:  on the w/e  Food restrictions: n/a  Cooking: self   Food Shopping: self    Physical Activity Intake  Activity:feeding barn animals and laying hay  Frequency:daily  Physical limitations/barriers to exercise: n/a    Estimated Needs  Energy  [employer] Needs: BMR : 2200   1-2# loss weekly sedentary:  9990-8787            1-2# loss weekly lightly active: 2138-3420  Maintenance calories for sedentary activity level: 2640  Protein: 76-95 gm     (1 2-1 5g/kg IBW)  Fluid: 74 oz   (35mL/kg IBW)    Nutrition Diagnosis  Yes;     Overweight/obesity  related to Excess energy intake as evidenced by  BMI more than normative standard for age and sex (obesity-grade III 36+)       Nutrition Intervention    Nutrition Prescription  Calories: 4049-5035  Protein: >85 gm    Meal Plan (Bennie/Pro)  Breakfast: 300-400, 20-30  Snack: 0-160, 0-30  Lunch: 350-450, 28  Snack: 150-200, 5-10  Dinner: 400-450, 28-42   Snack: 100-150, 5-10    Nutrition Education:    Calorie controlled menu  Lean protein food choices  Healthy snack options  Food journaling tips      Nutrition Counseling:  Strategies: meal planning, portion sizes, healthy snack choices, hydration, fiber intake, protein intake, exercise, food journal      Monitoring and Evaluation:  Evaluation criteria:  Energy Intake  Meet protein needs  Maintain adequate hydration  Monitor weekly weight  Meal planning/preparation  Food journal   Decreased portions at mealtimes and snacks  Physical activity     Barriers to learning:none  Readiness to change: Action:  (Changing behavior)  Comprehension: very good  Expected Compliance: very good

## 2021-03-10 DIAGNOSIS — Z23 ENCOUNTER FOR IMMUNIZATION: ICD-10-CM

## 2021-04-01 NOTE — PROGRESS NOTES
Weight Management Medical Nutrition Assessment  Kiersten Mtz is present for 2 of 6 bundle  Current wt: 324 5  lbs  Loss of 6 1 lbs x 1 month  Some more stress r/t ill relative  Reports last week was not great, calories higher than normal   Has been logging and weighing foods with Lose It vijaya  Avg ~1850 calories/day not including last week  Back at school, getting more steps, hitting 10k  Still some hunger in the evening, may be low in protein on these days  She will be more mindful of this  She will f/u in 1 month  Patient seen by Medical Provider in past 6 months:  no  Requested to schedule appointment with Medical Provider: No    Anthropometric Measurements  Start Weight (#): 332 lbs as per chart from 1/18/21; 335 lbs 2/10/21   Current wt:     Ideal Body Weight (#): 137 5 lbs    Goal Weight (#):  lbs  Highest: 332 lbs  Lowest: unknown     Weight Loss History  Previous weight loss attempts: Commercial Programs ([employer], [employer], etc )    Food and Nutrition Related History  Wake up: 6-6:30   Bed Time: 9:00    Food Recall  Breakfast: 7:30: 1/2 cup whites, 1 egg, laughing cow cheese wedge, 1 piece 647 or 647 muffin, OR cheese, Guatemalan mehta, english muffin, 1 egg, 1/2 banana OR high protein/high fiber oatmeal   Snack: ~11:00 fruit OR Premier Protein or Atkins shake OR skip  Lunch: 1:20: 2 oz turkey & 1 cheese s/w on 647 bread w/gonzales, goldfish, sometimes rice pudding or greek yogurt, sometimes 5 layer dip (35 lillie 2 tbsp) & tortilla chips  Snack: 3:00 cottage cheese/fruit or yogurt or pistachios    Dinner: 5-6:00: 4 oz protein,  larger portions veggies OR turkey meatballs, 647 bun, side salad, 1 portion french fries OR frozen pizza 1x/wk OR 1 slice grilled cheese s/w on 647, sometimes tomato soup OR eggs, sausage/ham, potatoes  Snack: 7:00: greek yogurt OR pistachios    Beverages: water and coffee/tea  Volume of beverage intake: a lot of water, 2 travel mugs coffee w/3 tbsp FFcoffeemate french vanilla creamer     Weekends: Same  Cravings: chocolate  Trouble area of day: evening    Frequency of Eating out:  on the w/e  Food restrictions: n/a  Cooking: self   Food Shopping: self    Physical Activity Intake  Activity:feeding barn animals and laying hay  Frequency:daily  Physical limitations/barriers to exercise: n/a    Estimated Needs  Energy  [employer] REE 2042x1  [phone]  [employer] Needs: BMR : 2172   1-2# loss weekly sedentary:  0735-3017            1-2# loss weekly lightly active: 9395-9603  Maintenance calories for sedentary activity level: 2607  Protein: 76-95 gm     (1 2-1 5g/kg IBW)  Fluid: 74 oz   (35mL/kg IBW)    Nutrition Diagnosis  Yes;     Overweight/obesity  related to Excess energy intake as evidenced by  BMI more than normative standard for age and sex (obesity-grade III 36+)       Nutrition Intervention    Nutrition Prescription  Calories: 3173-2867  Protein: >85 gm    Meal Plan (Bennie/Pro)  Breakfast: 300-400, 20-30  Snack: 0-160, 0-30  Lunch: 350-450, 28  Snack: 150-200, 5-10  Dinner: 400-450, 28-42   Snack: 100-150, 5-10    Nutrition Education:    Calorie controlled menu  Lean protein food choices  Healthy snack options  Food journaling tips      Nutrition Counseling:  Strategies: meal planning, portion sizes, healthy snack choices, hydration, fiber intake, protein intake, exercise, food journal      Monitoring and Evaluation:  Evaluation criteria:  Energy Intake  Meet protein needs  Maintain adequate hydration  Monitor weekly weight  Meal planning/preparation  Food journal   Decreased portions at mealtimes and snacks  Physical activity     Barriers to learning:none  Readiness to change: Action:  (Changing behavior)  Comprehension: very good  Expected Compliance: very good

## 2021-04-02 ENCOUNTER — OFFICE VISIT (OUTPATIENT)
Dept: BARIATRICS | Facility: CLINIC | Age: 41
End: 2021-04-02

## 2021-04-02 VITALS — HEIGHT: 68 IN | WEIGHT: 293 LBS | BODY MASS INDEX: 44.41 KG/M2

## 2021-04-02 DIAGNOSIS — R63.5 ABNORMAL WEIGHT GAIN: Primary | ICD-10-CM

## 2021-04-02 PROCEDURE — RECHECK

## 2021-04-20 ENCOUNTER — OFFICE VISIT (OUTPATIENT)
Dept: FAMILY MEDICINE CLINIC | Facility: CLINIC | Age: 41
End: 2021-04-20
Payer: COMMERCIAL

## 2021-04-20 VITALS
DIASTOLIC BLOOD PRESSURE: 84 MMHG | HEIGHT: 67 IN | OXYGEN SATURATION: 99 % | RESPIRATION RATE: 22 BRPM | SYSTOLIC BLOOD PRESSURE: 130 MMHG | HEART RATE: 68 BPM | BODY MASS INDEX: 45.99 KG/M2 | TEMPERATURE: 98.9 F | WEIGHT: 293 LBS

## 2021-04-20 DIAGNOSIS — F41.1 GENERALIZED ANXIETY DISORDER: Primary | ICD-10-CM

## 2021-04-20 DIAGNOSIS — F32.A DEPRESSION, UNSPECIFIED DEPRESSION TYPE: ICD-10-CM

## 2021-04-20 DIAGNOSIS — E66.01 CLASS 3 SEVERE OBESITY DUE TO EXCESS CALORIES WITHOUT SERIOUS COMORBIDITY WITH BODY MASS INDEX (BMI) OF 45.0 TO 49.9 IN ADULT (HCC): ICD-10-CM

## 2021-04-20 PROCEDURE — 3725F SCREEN DEPRESSION PERFORMED: CPT | Performed by: FAMILY MEDICINE

## 2021-04-20 PROCEDURE — 3008F BODY MASS INDEX DOCD: CPT | Performed by: FAMILY MEDICINE

## 2021-04-20 PROCEDURE — 99214 OFFICE O/P EST MOD 30 MIN: CPT | Performed by: FAMILY MEDICINE

## 2021-04-20 PROCEDURE — 1036F TOBACCO NON-USER: CPT | Performed by: FAMILY MEDICINE

## 2021-04-20 RX ORDER — DULOXETIN HYDROCHLORIDE 60 MG/1
60 CAPSULE, DELAYED RELEASE ORAL DAILY
Qty: 90 CAPSULE | Refills: 1 | Status: SHIPPED | OUTPATIENT
Start: 2021-04-20 | End: 2021-07-20 | Stop reason: SDUPTHER

## 2021-04-20 RX ORDER — LORAZEPAM 0.5 MG/1
0.5 TABLET ORAL EVERY 6 HOURS PRN
Qty: 30 TABLET | Refills: 0 | Status: SHIPPED | OUTPATIENT
Start: 2021-04-20 | End: 2021-08-30 | Stop reason: SDUPTHER

## 2021-04-20 RX ORDER — IBUPROFEN 400 MG/1
400 TABLET ORAL EVERY 6 HOURS PRN
COMMUNITY
Start: 2021-01-29

## 2021-04-20 RX ORDER — METHYLPREDNISOLONE 4 MG/1
TABLET ORAL
COMMUNITY
Start: 2021-01-29 | End: 2021-04-20 | Stop reason: ALTCHOICE

## 2021-04-20 NOTE — PROGRESS NOTES
Assessment/Plan:  Problem List Items Addressed This Visit        Other    Class 3 severe obesity due to excess calories without serious comorbidity with body mass index (BMI) of 45 0 to 49 9 in [facility])       The patient is currently going through our weight management program and is doing very well with it  She has already lost 15 lb and feels great  I encouraged her to keep up the excellent working to continue with her diet and exercise  We will see her back in the office as scheduled  Depression       The patient is doing very well with her duloxetine  We have made no changes today  She will continue with her current medication  We will continue to monitor closely  We will see her back in the office as scheduled  Relevant Medications    DULoxetine (CYMBALTA) 60 mg delayed release capsule    LORazepam (ATIVAN) 0 5 mg tablet    Generalized anxiety disorder - Primary      The patient's anxiety is well controlled with the duloxetine  We will continue her on this current medication  She continues to use the lorazepam very sparingly and only if absolutely needed  We will continue to monitor very closely  Relevant Medications    DULoxetine (CYMBALTA) 60 mg delayed release capsule    LORazepam (ATIVAN) 0 5 mg tablet          Return in about 3 months (around 7/20/2021) for Recheck  Subjective:   Chief Complaint   Patient presents with    Follow-up     3 month follow-up         Patient ID: Elin Peña is a 41 y o  female presents today for a routine checkup of her depression and anxiety  Elin Peña is a 41 y o  female  who presents today for follow-up of her depression, anxiety, and obesity  She is doing well on her medication  There are less panic attacks  She is under a lot of stress  The medication is helping her  She had lost 15 lbs with the Weight Management Program   She is eating better  The patient denies any chest pain, shortness of breath, or palpitations  There is no edema  There are no headaches or visual changes  There is no lightheadedness, dizziness, or fainting spells  Anxiety  Presents for follow-up visit  Patient reports no chest pain, compulsions, confusion, decreased concentration, depressed mood, dizziness, dry mouth, excessive worry, feeling of choking, hyperventilation, impotence, insomnia, irritability, malaise, muscle tension, nausea, nervous/anxious behavior, obsessions, palpitations, panic, restlessness, shortness of breath or suicidal ideas           The following portions of the patient's history were reviewed and updated as appropriate: allergies, current medications, past family history, past medical history, past social history, past surgical history and problem list   Patient Active Problem List   Diagnosis    Female pelvic pain    Depression    Generalized anxiety disorder    S/P abdominal supracervical subtotal hysterectomy    Allergic rhinitis    Disturbance in sleep behavior    Class 3 severe obesity due to excess calories without serious comorbidity with body mass index (BMI) of 45 0 to 49 9 in [facility])     Past Medical History:   Diagnosis Date    Allergic rhinitis     Anxiety     Depression     Family health problem     mother and grandmother h/o blood clots after surgery    Fibroid     Fullness in ear, left     Resolved [id number]    Hypertension     Resolved [id number]    Urinary tract infection     h/o     Past Surgical History:   Procedure Laterality Date    CHOLECYSTECTOMY      SC TOTAL ABDOM HYSTERECTOMY N/A 6/18/2019    Procedure: Supracervical IWONA, removal of bilat tubes, removal of IUD, cystoscopy;  Surgeon: Alicia Callahan MD;  Location: [facility];  Service: Gynecology    TOOTH EXTRACTION       No Known Allergies  Family History   Problem Relation Age of Onset    Anxiety disorder Mother     Other Mother         Blood clots    Hypertension Mother     Alcohol abuse Father     Hypertension Brother    Abram Bruner Anxiety disorder Maternal Grandmother     Other Maternal Grandmother         Blood clots    Lung cancer Maternal Grandfather     Heart disease Family         cardiac disorder    Cancer Family         lung ca    Diabetes Family     Cancer Family     Ovarian cancer Maternal Aunt     Alcohol abuse Maternal Aunt 72    Prostate cancer Maternal Uncle      Social History     Socioeconomic History    Marital status: /Civil Union     Spouse name: Not on file    Number of children: Not on file    Years of education: Not on file    Highest education level: Not on file   Occupational History    Not on file   Social Needs    Financial resource strain: Not hard at all   [place] insecurity     Worry: Never true     Inability: Never true   [employer] needs     Medical: No     Non-medical: No   Tobacco Use    Smoking status: Never Smoker    Smokeless tobacco: Never Used   Substance and Sexual Activity    Alcohol use: Yes     Frequency: Monthly or less     Drinks per session: 1 or 2     Binge frequency: Never    Drug use: No    Sexual activity: Yes     Partners: Male     Birth control/protection: Female Sterilization   Lifestyle    Physical activity     Days per week: 0 days     Minutes per session: 0 min    Stress:  To some extent   Relationships    Social connections     Talks on phone: More than three times a week     Gets together: Once a week     Attends Zoroastrianism service: More than 4 times per year     Active member of club or organization: Yes     Attends meetings of clubs or organizations: More than 4 times per year     Relationship status:     Intimate partner violence     Fear of current or ex partner: No     Emotionally abused: No     Physically abused: No     Forced sexual activity: No   Other Topics Concern    Not on file   Social History Narrative    Always uses seat belt    Caffeine use    [employer])     Current Outpatient Medications on File Prior to Visit   Medication Sig Dispense Refill    Azelastine HCl 137 MCG/SPRAY SOLN 1-2 puffs each nostril twice a day when necessary nasal congestion 1 Bottle 11    B-D ALLERGY SYRINGE 1CC/28G 28G X 1/2" 1 ML MISC       calcium carbonate (OS-MAUREEN) 600 MG tablet Take 600 mg by mouth daily      Cetirizine HCl (ZYRTEC ALLERGY) 10 MG CAPS Take 1 capsule by mouth daily as needed       montelukast (SINGULAIR) 10 mg tablet Take 1 tablet (10 mg total) by mouth daily at bedtime 30 tablet 11    Tuberculin-Allergy Syringes 28G X 1/2" 0 5 ML MISC Inject under the skin every 30 (thirty) days 50 each 0     No current facility-administered medications on file prior to visit  Review of Systems   Constitutional: Negative  Negative for irritability  HENT: Negative  Eyes: Negative  Respiratory: Negative  Negative for shortness of breath  Cardiovascular: Negative  Negative for chest pain and palpitations  Gastrointestinal: Negative  Negative for nausea  Endocrine: Negative  Genitourinary: Negative  Negative for impotence  Musculoskeletal: Negative  Skin: Negative  Allergic/Immunologic: Negative  Neurological: Negative  Negative for dizziness  Hematological: Negative  Psychiatric/Behavioral: Negative  Negative for confusion, decreased concentration and suicidal ideas  The patient is not nervous/anxious and does not have insomnia  Objective:  Vitals:    04/20/21 1728 04/20/21 1801   BP: 148/88 130/84   BP Location: Right arm    Patient Position: Sitting    Cuff Size: Large    Pulse: 88 68   Resp: 22    Temp: 98 9 °F (37 2 °C)    TempSrc: Tympanic    SpO2: 99%    Weight: (!) 146 kg (322 lb)    Height: 5' 6 75" (1 695 m)      Body mass index is 50 81 kg/m²  Physical Exam  Constitutional:       Appearance: She is well-developed  HENT:      Head: Normocephalic and atraumatic  Mouth/Throat:      Pharynx: No oropharyngeal exudate     Eyes:      Conjunctiva/sclera: Conjunctivae normal       Pupils: Pupils are equal, round, and reactive to light  Neck:      Musculoskeletal: Normal range of motion  Thyroid: No thyromegaly  Vascular: No JVD  Trachea: No tracheal deviation  Cardiovascular:      Rate and Rhythm: Normal rate and regular rhythm  Heart sounds: Normal heart sounds  No murmur  No friction rub  No gallop  Pulmonary:      Effort: Pulmonary effort is normal  No respiratory distress  Breath sounds: Normal breath sounds  No stridor  No wheezing or rales  Chest:      Chest wall: No tenderness  Abdominal:      General: Bowel sounds are normal  There is no distension  Palpations: Abdomen is soft  There is no mass  Tenderness: There is no abdominal tenderness  There is no guarding or rebound  Musculoskeletal: Normal range of motion  General: No tenderness or deformity  Lymphadenopathy:      Cervical: No cervical adenopathy  Skin:     General: Skin is warm and dry  Neurological:      Mental Status: She is alert and oriented to person, place, and time  Cranial Nerves: No cranial nerve deficit  Motor: No abnormal muscle tone  Coordination: Coordination normal       Deep Tendon Reflexes: Reflexes are normal and symmetric   Reflexes normal

## 2021-04-22 NOTE — ASSESSMENT & PLAN NOTE
The patient is doing very well with her duloxetine  We have made no changes today  She will continue with her current medication  We will continue to monitor closely  We will see her back in the office as scheduled

## 2021-04-22 NOTE — ASSESSMENT & PLAN NOTE
The patient is currently going through our weight management program and is doing very well with it  She has already lost 15 lb and feels great  I encouraged her to keep up the excellent working to continue with her diet and exercise  We will see her back in the office as scheduled

## 2021-04-22 NOTE — ASSESSMENT & PLAN NOTE
The patient's anxiety is well controlled with the duloxetine  We will continue her on this current medication  She continues to use the lorazepam very sparingly and only if absolutely needed  We will continue to monitor very closely

## 2021-05-21 LAB
ALBUMIN SERPL-MCNC: 4 G/DL (ref 3.8–4.8)
ALBUMIN/GLOB SERPL: 1.7 {RATIO} (ref 1.2–2.2)
ALP SERPL-CCNC: 89 IU/L (ref 48–121)
ALT SERPL-CCNC: 20 IU/L (ref 0–32)
APPEARANCE UR: CLEAR
AST SERPL-CCNC: 21 IU/L (ref 0–40)
BASOPHILS # BLD AUTO: 0 X10E3/UL (ref 0–0.2)
BASOPHILS NFR BLD AUTO: 0 %
BILIRUB SERPL-MCNC: 0.3 MG/DL (ref 0–1.2)
BILIRUB UR QL STRIP: NEGATIVE
BUN SERPL-MCNC: 10 MG/DL (ref 6–24)
BUN/CREAT SERPL: 13 (ref 9–23)
CALCIUM SERPL-MCNC: 9 MG/DL (ref 8.7–10.2)
CHLORIDE SERPL-SCNC: 104 MMOL/L (ref 96–106)
CHOLEST SERPL-MCNC: 196 MG/DL (ref 100–199)
CHOLEST/HDLC SERPL: 4.3 RATIO (ref 0–4.4)
CO2 SERPL-SCNC: 25 MMOL/L (ref 20–29)
COLOR UR: YELLOW
CREAT SERPL-MCNC: 0.76 MG/DL (ref 0.57–1)
EOSINOPHIL # BLD AUTO: 0 X10E3/UL (ref 0–0.4)
EOSINOPHIL NFR BLD AUTO: 0 %
ERYTHROCYTE [DISTWIDTH] IN BLOOD BY AUTOMATED COUNT: 13.6 % (ref 11.7–15.4)
GLOBULIN SER-MCNC: 2.4 G/DL (ref 1.5–4.5)
GLUCOSE SERPL-MCNC: 116 MG/DL (ref 65–99)
GLUCOSE UR QL: NEGATIVE
HCT VFR BLD AUTO: 40.7 % (ref 34–46.6)
HDLC SERPL-MCNC: 46 MG/DL
HGB BLD-MCNC: 13.5 G/DL (ref 11.1–15.9)
HGB UR QL STRIP: NEGATIVE
IMM GRANULOCYTES # BLD: 0 X10E3/UL (ref 0–0.1)
IMM GRANULOCYTES NFR BLD: 0 %
KETONES UR QL STRIP: NEGATIVE
LDLC SERPL CALC-MCNC: 124 MG/DL (ref 0–99)
LDLC SERPL DIRECT ASSAY-MCNC: 128 MG/DL (ref 0–99)
LEUKOCYTE ESTERASE UR QL STRIP: NEGATIVE
LYMPHOCYTES # BLD AUTO: 2 X10E3/UL (ref 0.7–3.1)
LYMPHOCYTES NFR BLD AUTO: 27 %
MCH RBC QN AUTO: 28.4 PG (ref 26.6–33)
MCHC RBC AUTO-ENTMCNC: 33.2 G/DL (ref 31.5–35.7)
MCV RBC AUTO: 86 FL (ref 79–97)
MICRO URNS: NORMAL
MONOCYTES # BLD AUTO: 0.6 X10E3/UL (ref 0.1–0.9)
MONOCYTES NFR BLD AUTO: 8 %
NEUTROPHILS # BLD AUTO: 4.8 X10E3/UL (ref 1.4–7)
NEUTROPHILS NFR BLD AUTO: 65 %
NITRITE UR QL STRIP: NEGATIVE
PH UR STRIP: 7.5 [PH] (ref 5–7.5)
PLATELET # BLD AUTO: 267 X10E3/UL (ref 150–450)
POTASSIUM SERPL-SCNC: 4 MMOL/L (ref 3.5–5.2)
PROT SERPL-MCNC: 6.4 G/DL (ref 6–8.5)
PROT UR QL STRIP: NEGATIVE
RBC # BLD AUTO: 4.76 X10E6/UL (ref 3.77–5.28)
SL AMB EGFR AFRICAN AMERICAN: 112 ML/MIN/1.73
SL AMB EGFR NON AFRICAN AMERICAN: 97 ML/MIN/1.73
SL AMB VLDL CHOLESTEROL CALC: 26 MG/DL (ref 5–40)
SODIUM SERPL-SCNC: 139 MMOL/L (ref 134–144)
SP GR UR: 1.02 (ref 1–1.03)
TRIGL SERPL-MCNC: 147 MG/DL (ref 0–149)
TSH SERPL DL<=0.005 MIU/L-ACNC: 2.17 UIU/ML (ref 0.45–4.5)
UROBILINOGEN UR STRIP-ACNC: 0.2 MG/DL (ref 0.2–1)
WBC # BLD AUTO: 7.4 X10E3/UL (ref 3.4–10.8)

## 2021-05-28 NOTE — PROGRESS NOTES
Weight Management Medical Nutrition Assessment  Lizzy Arteaga is present for 3 of 6 bundle  Seen 2 months ago  Current wt:  314 5  lbs  Loss of   10 lbs x 2 month with overall loss of 17 5 lbs x 4 months  Continues to log with [employer], avg 5766-2453 calories/day  Good changes to lab values noted  Discussed her routine now that she is home for the summer  Questions answered re: dehydrated apples  She will f/u in ~2 months       Patient seen by Medical Provider in past 6 months:  no  Requested to schedule appointment with Medical Provider: No    Anthropometric Measurements  Start Weight (#): 332 lbs as per chart from 1/18/21; 335 lbs 2/10/21   Current wt:  314 5 lbs  Ideal Body Weight (#): 137 5 lbs    Goal Weight (#):  lbs  Highest: 332 lbs  Lowest: unknown     Weight Loss History  Previous weight loss attempts: Commercial Programs ([employer], [employer], etc )    Food and Nutrition Related History  Wake up: 6-6:30   Bed Time: 9:30-11:00    Food Recall  Breakfast: 7:30: 1/2 cup whites, 1 egg, laughing cow cheese wedge, 1 piece 647 or 647 muffin, OR cheese, Peruvian mehta, english muffin, 1 egg, 1/2 banana OR high protein/high fiber oatmeal OR 2 piece 633 bread, 2 slices mehta, 1/2 avocado, coffee  Snack: ~11:00 fruit  Lunch: 12-1:00: 2 oz turkey & 1 cheese s/w on 647 bread w/gonzales, goldfish, sometimes rice pudding or greek yogurt  Snack: 3:00 cottage cheese/fruit or yogurt or pistachios    Dinner: 5-6:00: 4 oz protein,  larger portions veggies OR turkey meatballs, 647 bun, side salad, 1 portion french fries OR frozen pizza 1x/wk OR 1 slice grilled cheese s/w on 647, sometimes tomato soup OR eggs, sausage/ham, potatoes  Snack: 7:00: ice cream, yasso  lillie ice cream w/strawberries    Beverages: water and coffee/tea  Volume of beverage intake: a lot of water, 2 travel mugs coffee w/3 tbsp FFcoffeemate french vanilla creamer     Weekends: Same  Cravings: chocolate  Trouble area of day: evening    Frequency of Eating out:  on the w/e  Food restrictions: n/a  Cooking: self   Food Shopping: self    Physical Activity Intake  Activity:feeding barn animals and laying hay, gardening  Frequency:daily  Physical limitations/barriers to exercise: n/a    Estimated Needs  Energy  [employer] Needs: BMR : 2127   1-2# loss weekly sedentary:  3936-5969            1-2# loss weekly lightly active: 3883-6479  Maintenance calories for sedentary activity level: 2553  Protein: 76-95 gm     (1 2-1 5g/kg IBW)  Fluid: 74 oz   (35mL/kg IBW)    Nutrition Diagnosis  Yes;     Overweight/obesity  related to Excess energy intake as evidenced by  BMI more than normative standard for age and sex (obesity-grade III 36+)       Nutrition Intervention    Nutrition Prescription  Calories: 2126-0321  Protein: >85 gm    Meal Plan (Bennie/Pro)  Breakfast: 300-400, 20-30  Snack: 0-160, 0-30  Lunch: 350-450, 28  Snack: 150-200, 5-10  Dinner: 400-450, 28-42   Snack: 100-150, 5-10    Nutrition Education:    Calorie controlled menu  Lean protein food choices  Healthy snack options  Food journaling tips      Nutrition Counseling:  Strategies: meal planning, portion sizes, healthy snack choices, hydration, fiber intake, protein intake, exercise, food journal      Monitoring and Evaluation:  Evaluation criteria:  Energy Intake  Meet protein needs  Maintain adequate hydration  Monitor weekly weight  Meal planning/preparation  Food journal   Decreased portions at mealtimes and snacks  Physical activity     Barriers to learning:none  Readiness to change: Action:  (Changing behavior)  Comprehension: very good  Expected Compliance: very good

## 2021-06-07 ENCOUNTER — OFFICE VISIT (OUTPATIENT)
Dept: BARIATRICS | Facility: CLINIC | Age: 41
End: 2021-06-07

## 2021-06-07 VITALS — BODY MASS INDEX: 44.41 KG/M2 | WEIGHT: 293 LBS | HEIGHT: 68 IN

## 2021-06-07 DIAGNOSIS — R63.5 ABNORMAL WEIGHT GAIN: Primary | ICD-10-CM

## 2021-06-07 PROCEDURE — 3008F BODY MASS INDEX DOCD: CPT | Performed by: FAMILY MEDICINE

## 2021-06-07 PROCEDURE — RECHECK

## 2021-07-20 ENCOUNTER — OFFICE VISIT (OUTPATIENT)
Dept: FAMILY MEDICINE CLINIC | Facility: CLINIC | Age: 41
End: 2021-07-20
Payer: COMMERCIAL

## 2021-07-20 VITALS
WEIGHT: 293 LBS | TEMPERATURE: 98.2 F | OXYGEN SATURATION: 96 % | HEIGHT: 67 IN | RESPIRATION RATE: 18 BRPM | BODY MASS INDEX: 45.99 KG/M2 | SYSTOLIC BLOOD PRESSURE: 124 MMHG | HEART RATE: 69 BPM | DIASTOLIC BLOOD PRESSURE: 86 MMHG

## 2021-07-20 DIAGNOSIS — E66.01 CLASS 3 SEVERE OBESITY DUE TO EXCESS CALORIES WITHOUT SERIOUS COMORBIDITY WITH BODY MASS INDEX (BMI) OF 45.0 TO 49.9 IN ADULT (HCC): ICD-10-CM

## 2021-07-20 DIAGNOSIS — Z12.31 ENCOUNTER FOR SCREENING MAMMOGRAM FOR BREAST CANCER: ICD-10-CM

## 2021-07-20 DIAGNOSIS — F32.A DEPRESSION, UNSPECIFIED DEPRESSION TYPE: Primary | ICD-10-CM

## 2021-07-20 DIAGNOSIS — F41.1 GENERALIZED ANXIETY DISORDER: ICD-10-CM

## 2021-07-20 PROCEDURE — 3725F SCREEN DEPRESSION PERFORMED: CPT | Performed by: FAMILY MEDICINE

## 2021-07-20 PROCEDURE — 99214 OFFICE O/P EST MOD 30 MIN: CPT | Performed by: FAMILY MEDICINE

## 2021-07-20 RX ORDER — DULOXETIN HYDROCHLORIDE 60 MG/1
60 CAPSULE, DELAYED RELEASE ORAL DAILY
Qty: 90 CAPSULE | Refills: 1 | Status: SHIPPED | OUTPATIENT
Start: 2021-07-20 | End: 2022-03-23 | Stop reason: SDUPTHER

## 2021-07-20 RX ORDER — ARIPIPRAZOLE 2 MG/1
2 TABLET ORAL DAILY
Qty: 30 TABLET | Refills: 5 | Status: SHIPPED | OUTPATIENT
Start: 2021-07-20 | End: 2021-08-30 | Stop reason: ALTCHOICE

## 2021-07-20 NOTE — PROGRESS NOTES
Assessment/Plan:  Problem List Items Addressed This Visit        Other    Class 3 severe obesity due to excess calories without serious comorbidity with body mass index (BMI) of 45 0 to 49 9 in [facility])     The patient will continue to work with the weight management program to assist with her weight loss  She will continue to work on her diet and exercise  Depression - Primary     The patient is still having depression symptoms despite being on 60 mg of the duloxetine  We are going to add on the Abilify at a low dose as an adjunct medicine to help with her symptoms  We will monitor closely  She will call with any worsening symptoms  We will see her back in the office again as scheduled  Relevant Medications    DULoxetine (CYMBALTA) 60 mg delayed release capsule    ARIPiprazole (ABILIFY) 2 mg tablet    Generalized anxiety disorder    Relevant Medications    DULoxetine (CYMBALTA) 60 mg delayed release capsule    ARIPiprazole (ABILIFY) 2 mg tablet      Other Visit Diagnoses     Encounter for screening mammogram for breast cancer        Relevant Orders    Mammo screening bilateral w 3d & cad          Return for Next scheduled follow up  Subjective:   Chief Complaint   Patient presents with    Follow-up     3 month follow-up        Patient ID: Halie Lo is a 35 y o  female presents today for a follow-up of her depression and anxiety  Halie Lo is a 35 y o  female who presents today for follow-up of her depression and anxiety  She had been doing well on the duloxetine up until this point  She notices that over the last several weeks, she has been having increased depression and anxiety symptoms  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 and all the uncertainty involved  She feels her medication could be a little bit stronger  She has been tolerating medication well with no side effects  There are no suicidal thoughts    She is eating and sleeping well  She denies any severe panic attacks  There is more depression  There are no panic attacks  She is having less motivation  She is more fidgety  There are trouble concentrating  She is watching her diet and she is exercising she is working with nutrition  She is sleeping okay at night  There is tiredness when she wakes up  The patient denies any chest pain, shortness of breath, or palpitations  There is no edema  There are no headaches or visual changes  There is no lightheadedness, dizziness, or fainting spells  The patient currently denies any nausea, vomiting, or GERD symptoms  she has normal bowel movements and normal urine output  she has a normal appetite  Anxiety  Presents for follow-up visit  Symptoms include depressed mood, excessive worry, nervous/anxious behavior and obsessions  Patient reports no chest pain, compulsions, confusion, decreased concentration, dizziness, dry mouth, feeling of choking, hyperventilation, impotence, insomnia, irritability, malaise, muscle tension, nausea, palpitations, panic, restlessness, shortness of breath or suicidal ideas  Symptoms occur constantly           The following portions of the patient's history were reviewed and updated as appropriate: allergies, current medications, past family history, past medical history, past social history, past surgical history and problem list   Patient Active Problem List   Diagnosis    Female pelvic pain    Depression    Generalized anxiety disorder    S/P abdominal supracervical subtotal hysterectomy    Allergic rhinitis    Disturbance in sleep behavior    Class 3 severe obesity due to excess calories without serious comorbidity with body mass index (BMI) of 45 0 to 49 9 in adult [facility])     Past Medical History:   Diagnosis Date    Allergic rhinitis     Anxiety     Depression     Family health problem     mother and grandmother h/o blood clots after surgery    Fibroid     Fullness in ear, left     Resolved [id number]    Hypertension     Resolved [id number]    Urinary tract infection     h/o     Past Surgical History:   Procedure Laterality Date    CHOLECYSTECTOMY      CO TOTAL ABDOM HYSTERECTOMY N/A 6/18/2019    Procedure: Supracervical IWONA, removal of bilat tubes, removal of IUD, cystoscopy;  Surgeon: Hal Camacho MD;  Location: [facility];  Service: Gynecology    TOOTH EXTRACTION       Allergies   Allergen Reactions    Pollen Extract Allergic Rhinitis     Family History   Problem Relation Age of Onset    Anxiety disorder Mother     Other Mother         Blood clots    Hypertension Mother     Alcohol abuse Father     Hypertension Brother     Anxiety disorder Maternal Grandmother     Other Maternal Grandmother         Blood clots    Lung cancer Maternal Grandfather     Heart disease Family         cardiac disorder    Cancer Family         lung ca    Diabetes Family     Cancer Family     Ovarian cancer Maternal Aunt     Alcohol abuse Maternal Aunt 72    Prostate cancer Maternal Uncle      Social History     Socioeconomic History    Marital status: /Civil Union     Spouse name: Not on file    Number of children: Not on file    Years of education: Not on file    Highest education level: Not on file   Occupational History    Not on file   Tobacco Use    Smoking status: Never Smoker    Smokeless tobacco: Never Used   Vaping Use    Vaping Use: Never used   Substance and Sexual Activity    Alcohol use: Yes     Comment: occassional    Drug use: No    Sexual activity: Yes     Partners: Male     Birth control/protection: Female Sterilization   Other Topics Concern    Not on file   Social History Narrative    Always uses seat belt    Caffeine use    [employer] ([employer])     Social Determinants of Health     Financial Resource Strain: Low Risk     Difficulty of Paying Living Expenses: Not hard at all   Food Insecurity: No Food Insecurity  Worried About Running Out of Food in the Last Year: Never true    Jason of Food in the Last Year: Never true   Transportation Needs: No Transportation Needs    Lack of Transportation (Medical): No    Lack of Transportation (Non-Medical): No   Physical Activity: Inactive    Days of Exercise per Week: 0 days    Minutes of Exercise per Session: 0 min   Stress: Stress Concern Present    Feeling of Stress : To some extent   Social Connections: Socially Integrated    Frequency of Communication with Friends and Family: More than three times a week    Frequency of Social Gatherings with Friends and Family: Once a week    Attends Sikh Services: More than 4 times per year    Active Member of [employer] or Organizations: Yes    Attends Club or Organization Meetings: More than 4 times per year    Marital Status:    Intimate Partner Violence: Not At Risk    Fear of Current or Ex-Partner: No    Emotionally Abused: No    Physically Abused: No    Sexually Abused: No     Current Outpatient Medications on File Prior to Visit   Medication Sig Dispense Refill    Azelastine HCl 137 MCG/SPRAY SOLN 1-2 puffs each nostril twice a day when necessary nasal congestion 1 Bottle 11    calcium carbonate (OS-MAUREEN) 600 MG tablet Take 600 mg by mouth daily      Cetirizine HCl (ZYRTEC ALLERGY) 10 MG CAPS Take 1 capsule by mouth daily as needed       ibuprofen (MOTRIN) 400 mg tablet Take 400 mg by mouth every 6 (six) hours as needed      LORazepam (ATIVAN) 0 5 mg tablet Take 1 tablet (0 5 mg total) by mouth every 6 (six) hours as needed for anxiety 30 tablet 0    montelukast (SINGULAIR) 10 mg tablet Take 1 tablet (10 mg total) by mouth daily at bedtime 30 tablet 11    Tuberculin-Allergy Syringes 28G X 1/2" 0 5 ML MISC Inject under the skin every 30 (thirty) days (Patient not taking: Reported on 7/20/2021) 50 each 0     No current facility-administered medications on file prior to visit       Review of Systems Constitutional: Negative  Negative for irritability  HENT: Negative  Eyes: Negative  Respiratory: Negative  Negative for shortness of breath  Cardiovascular: Negative  Negative for chest pain and palpitations  Gastrointestinal: Negative  Negative for nausea  Endocrine: Negative  Genitourinary: Negative  Negative for impotence  Musculoskeletal: Negative  Skin: Negative  Allergic/Immunologic: Negative  Neurological: Negative  Negative for dizziness  Hematological: Negative  Psychiatric/Behavioral: Negative for confusion, decreased concentration and suicidal ideas  The patient is nervous/anxious  The patient does not have insomnia  Objective:  Vitals:    07/20/21 1204   BP: 124/86   BP Location: Right arm   Patient Position: Sitting   Cuff Size: Large   Pulse: 69   Resp: 18   Temp: 98 2 °F (36 8 °C)   TempSrc: Tympanic   SpO2: 96%   Weight: (!) 141 kg (311 lb 3 2 oz)   Height: 5' 7" (1 702 m)     Body mass index is 48 74 kg/m²  Physical Exam  Constitutional:       Appearance: She is well-developed  HENT:      Head: Normocephalic and atraumatic  Mouth/Throat:      Pharynx: No oropharyngeal exudate  Eyes:      Conjunctiva/sclera: Conjunctivae normal       Pupils: Pupils are equal, round, and reactive to light  Neck:      Thyroid: No thyromegaly  Vascular: No JVD  Trachea: No tracheal deviation  Cardiovascular:      Rate and Rhythm: Normal rate and regular rhythm  Heart sounds: Normal heart sounds  No murmur heard  No friction rub  No gallop  Pulmonary:      Effort: Pulmonary effort is normal  No respiratory distress  Breath sounds: Normal breath sounds  No stridor  No wheezing or rales  Chest:      Chest wall: No tenderness  Abdominal:      General: Bowel sounds are normal  There is no distension  Palpations: Abdomen is soft  There is no mass  Tenderness: There is no abdominal tenderness   There is no guarding or rebound  Musculoskeletal:         General: No tenderness or deformity  Normal range of motion  Cervical back: Normal range of motion  Lymphadenopathy:      Cervical: No cervical adenopathy  Skin:     General: Skin is warm and dry  Neurological:      Mental Status: She is alert and oriented to person, place, and time  Cranial Nerves: No cranial nerve deficit  Motor: No abnormal muscle tone  Coordination: Coordination normal       Deep Tendon Reflexes: Reflexes are normal and symmetric   Reflexes normal

## 2021-07-22 NOTE — ASSESSMENT & PLAN NOTE
The patient is still having depression symptoms despite being on 60 mg of the duloxetine  We are going to add on the Abilify at a low dose as an adjunct medicine to help with her symptoms  We will monitor closely  She will call with any worsening symptoms  We will see her back in the office again as scheduled

## 2021-07-22 NOTE — ASSESSMENT & PLAN NOTE
The patient will continue to work with the weight management program to assist with her weight loss  She will continue to work on her diet and exercise

## 2021-07-24 ENCOUNTER — OFFICE VISIT (OUTPATIENT)
Dept: URGENT CARE | Facility: CLINIC | Age: 41
End: 2021-07-24
Payer: COMMERCIAL

## 2021-07-24 VITALS
WEIGHT: 293 LBS | TEMPERATURE: 98 F | DIASTOLIC BLOOD PRESSURE: 98 MMHG | RESPIRATION RATE: 16 BRPM | HEART RATE: 100 BPM | BODY MASS INDEX: 45.99 KG/M2 | SYSTOLIC BLOOD PRESSURE: 143 MMHG | HEIGHT: 67 IN

## 2021-07-24 DIAGNOSIS — L25.5 RHUS DERMATITIS: Primary | ICD-10-CM

## 2021-07-24 PROCEDURE — 99212 OFFICE O/P EST SF 10 MIN: CPT | Performed by: PHYSICIAN ASSISTANT

## 2021-07-24 RX ORDER — PREDNISONE 10 MG/1
TABLET ORAL
Qty: 21 TABLET | Refills: 0 | Status: SHIPPED | OUTPATIENT
Start: 2021-07-24 | End: 2021-08-30 | Stop reason: ALTCHOICE

## 2021-07-24 NOTE — PROGRESS NOTES
[facility]    NAME: Gayatri Galindo is a 35 y o  female  : 1980    MRN: [id number]  DATE: 2021  TIME: 12:33 PM    Assessment and Plan   Rhus dermatitis [L25 5]  1  Rhus dermatitis  predniSONE 10 mg tablet       Patient Instructions   Patient Instructions   Poison Ivy   WHAT YOU NEED TO KNOW:   Poison ivy is a plant that can cause an itchy, uncomfortable rash on your skin  Poison ivy grows as a shrub or vine in woods, fields, and areas of thick [place]  It has 3 bright green leaves on each stem that turn red in renay  DISCHARGE INSTRUCTIONS:   Medicines:   · Antiseptic or drying creams or ointments: These medicines may be used to dry out the rash and decrease the itching  These products may be available without a doctor's order  · Steroids: This medicine helps decrease itching and inflammation  It can be given as a cream to apply to your skin or as a pill  · Antihistamines: This medicine may help decrease itching and help you sleep  It is available without a doctor's order  · Take your medicine as directed  Contact your healthcare provider if you think your medicine is not helping or if you have side effects  Tell him of her if you are allergic to any medicine  Keep a list of the medicines, vitamins, and herbs you take  Include the amounts, and when and why you take them  Bring the list or the pill bottles to follow-up visits  Carry your medicine list with you in case of an emergency  Follow up with your healthcare provider as directed:  Write down your questions so you remember to ask them during your visits  How your poison ivy rash spreads: You cannot spread poison ivy by touching your rash or the liquid from your blisters  Poison ivy is spread only if you scratch your skin while it still has oil on it  You may think your rash is spreading because new rashes appear over a number of days   This happens because areas covered by thin skin break out in a rash first  Your face or forearms may develop a rash before thicker areas, such as the palms of your hands  Self-care:   · Keep your rash clean and dry:  Wash it with soap and water  Gently pat it dry with a clean towel  · Try not to scratch or rub your rash: This can cause your skin to become infected  · Use a compress on your rash:  Dip a clean washcloth in cool water  Wring it out and place it on your rash  Leave the washcloth on your skin for 15 minutes  Do this at least 3 times per day  · Take a cornstarch or oatmeal bath: If your rash is too large to cover with wet washcloths, take 3 or 4 cornstarch baths daily  Mix 1 pound of cornstarch with a little water to make a paste  Add the paste to a tub full of water and mix well  You may also use colloidal oatmeal in the bath water  Use lukewarm water  Avoid hot water because it may cause your itching to increase  Prevent a poison ivy rash in the future:   · Wear skin protection:  Wear long pants, a long-sleeved shirt, and gloves  Use a skin block lotion to protect your skin from poison ivy oil  You can find this at a drugstore without a prescription  · Wash clothing after possible exposure: If you think you have been near a poison ivy plant, wash the clothes you were wearing separately from other clothes  Rinse the washing machine well after you take the clothes out  Scrub boots and shoes with warm, soapy water  Dry clean items and clothing that you cannot wash in water  Poison ivy oil is sticky and can stay on surfaces for a long time  It can cause a new rash even years later  · Bathe your pet:  Use warm water and shampoo on your pet's fur  This will prevent the spread of oil to your skin, car, and home  Wear long sleeves, long pants, and gloves while washing pets or any items that may have oil on them  · Reduce exposure to poison ivy:  Do not touch plants that look like poison ivy  Keep your yard free of poison ivy   While protecting your skin, remove the plant and the roots  Place them in a plastic bag and seal the bag tightly  · Do not burn poison ivy plants: This can spread the oil through the air  If you breathe the oil into your lungs, you could have swelling and serious breathing problems  Oil that clings to the fire kelsey can land on your skin and cause a rash  Contact your healthcare provider if:   · You have pus, soft yellow scabs, or tenderness on the rash  · The itching gets worse or keeps you awake at night  · The rash covers more than 1/4 of your skin or spreads to your eyes, mouth, or genital area  · The rash is not better after 2 to 3 weeks  · You have tender, swollen glands on the sides of your neck  · You have swelling in your arms and legs  · You have questions or concerns about your condition or care  Return to the emergency department if:   · You have a fever  · You have redness, swelling, and tenderness around the rash  · You have trouble breathing  © Copyright [employer] 2021 Information is for End User's use only and may not be sold, redistributed or otherwise used for commercial purposes  All illustrations and images included in CareNotes® are the copyrighted property of A D A M , Inc  or [facility]  The above information is an  only  It is not intended as medical advice for individual conditions or treatments  Talk to your doctor, nurse or pharmacist before following any medical regimen to see if it is safe and effective for you  Chief Complaint     Chief Complaint   Patient presents with   [address]     poison ivy to face and legs       History of Present Illness   Daria Villarreal presents to the clinic c/o  36 yo female with itchy rash on arms legs and face  She has been treating it with Benadryl cream   She has been itching and aching  She wants to make sure that a couple black spots on her legs are tics    Her face has become more itching swollen in the last couple days  She has been working along the [employer] [place] as well as gardening  Poison Ivy        Review of Systems   Review of Systems   Constitutional: Negative  Respiratory: Negative  Cardiovascular: Negative  Skin: Positive for rash         Current Medications     Long-Term Medications   Medication Sig Dispense Refill    ARIPiprazole (ABILIFY) 2 mg tablet Take 1 tablet (2 mg total) by mouth daily 30 tablet 5    calcium carbonate (OS-MAUREEN) 600 MG tablet Take 600 mg by mouth daily      Cetirizine HCl (ZYRTEC ALLERGY) 10 MG CAPS Take 1 capsule by mouth daily as needed       DULoxetine (CYMBALTA) 60 mg delayed release capsule Take 1 capsule (60 mg total) by mouth daily 90 capsule 1    LORazepam (ATIVAN) 0 5 mg tablet Take 1 tablet (0 5 mg total) by mouth every 6 (six) hours as needed for anxiety 30 tablet 0    montelukast (SINGULAIR) 10 mg tablet Take 1 tablet (10 mg total) by mouth daily at bedtime 30 tablet 11    Tuberculin-Allergy Syringes 28G X 1/2" 0 5 ML MISC Inject under the skin every 30 (thirty) days (Patient not taking: Reported on 7/20/2021) 50 each 0       Current Allergies     Allergies as of 07/24/2021 - Reviewed 07/24/2021   Allergen Reaction Noted    Pollen extract Allergic Rhinitis 07/20/2021          The following portions of the patient's history were reviewed and updated as appropriate: allergies, current medications, past family history, past medical history, past social history, past surgical history and problem list   Past Medical History:   Diagnosis Date    Allergic rhinitis     Anxiety     Depression     Family health problem     mother and grandmother h/o blood clots after surgery    Fibroid     Fullness in ear, left     Resolved [id number]    Hypertension     Resolved [id number]    Urinary tract infection     h/o     Past Surgical History:   Procedure Laterality Date    CHOLECYSTECTOMY      VA TOTAL ABDOM HYSTERECTOMY N/A 6/18/2019    Procedure: Supracervical IWONA, removal of bilat tubes, removal of IUD, cystoscopy;  Surgeon: More Romano MD;  Location: [facility];  Service: Gynecology    TOOTH EXTRACTION       Family History   Problem Relation Age of Onset    Anxiety disorder Mother     Other Mother         Blood clots    Hypertension Mother     Alcohol abuse Father     Hypertension Brother     Anxiety disorder Maternal Grandmother     Other Maternal Grandmother         Blood clots    Lung cancer Maternal Grandfather     Heart disease Family         cardiac disorder    Cancer Family         lung ca    Diabetes Family     Cancer Family     Ovarian cancer Maternal Aunt     Alcohol abuse Maternal Aunt 72    Prostate cancer Maternal Uncle        Objective   /98   Pulse 100   Temp 98 °F (36 7 °C)   Resp 16   Ht 5' 7" (1 702 m)   Wt (!) 141 kg (311 lb)   LMP 05/15/2019 (Approximate)   BMI 48 71 kg/m²   Patient's last menstrual period was 05/15/2019 (approximate)  Physical Exam     Physical Exam  Vitals and nursing note reviewed  Constitutional:       General: She is not in acute distress  Appearance: She is well-developed  She is not diaphoretic  Cardiovascular:      Rate and Rhythm: Normal rate  Pulmonary:      Effort: Pulmonary effort is normal    Skin:     General: Skin is warm and dry  Findings: Rash present  Comments: Scattered re papulovesicular lesions consistent with rhus  Face with increased redness and papular lesions consistent with poison ivy  Black small dots on left lower leg are small bloodied scabs and are not tics  Noted excoriations  Neurological:      Mental Status: She is alert and oriented to person, place, and time     Psychiatric:         Mood and Affect: Mood normal          Behavior: Behavior normal

## 2021-07-24 NOTE — PATIENT INSTRUCTIONS
Poison Ivy   WHAT YOU NEED TO KNOW:   Poison ivy is a plant that can cause an itchy, uncomfortable rash on your skin  Poison ivy grows as a shrub or vine in woods, fields, and areas of thick [place]  It has 3 bright green leaves on each stem that turn red in renay  DISCHARGE INSTRUCTIONS:   Medicines:   · Antiseptic or drying creams or ointments: These medicines may be used to dry out the rash and decrease the itching  These products may be available without a doctor's order  · Steroids: This medicine helps decrease itching and inflammation  It can be given as a cream to apply to your skin or as a pill  · Antihistamines: This medicine may help decrease itching and help you sleep  It is available without a doctor's order  · Take your medicine as directed  Contact your healthcare provider if you think your medicine is not helping or if you have side effects  Tell him of her if you are allergic to any medicine  Keep a list of the medicines, vitamins, and herbs you take  Include the amounts, and when and why you take them  Bring the list or the pill bottles to follow-up visits  Carry your medicine list with you in case of an emergency  Follow up with your healthcare provider as directed:  Write down your questions so you remember to ask them during your visits  How your poison ivy rash spreads: You cannot spread poison ivy by touching your rash or the liquid from your blisters  Poison ivy is spread only if you scratch your skin while it still has oil on it  You may think your rash is spreading because new rashes appear over a number of days  This happens because areas covered by thin skin break out in a rash first  Your face or forearms may develop a rash before thicker areas, such as the palms of your hands  Self-care:   · Keep your rash clean and dry:  Wash it with soap and water  Gently pat it dry with a clean towel  · Try not to scratch or rub your rash:   This can cause your skin to become infected  · Use a compress on your rash:  Dip a clean washcloth in cool water  Wring it out and place it on your rash  Leave the washcloth on your skin for 15 minutes  Do this at least 3 times per day  · Take a cornstarch or oatmeal bath: If your rash is too large to cover with wet washcloths, take 3 or 4 cornstarch baths daily  Mix 1 pound of cornstarch with a little water to make a paste  Add the paste to a tub full of water and mix well  You may also use colloidal oatmeal in the bath water  Use lukewarm water  Avoid hot water because it may cause your itching to increase  Prevent a poison ivy rash in the future:   · Wear skin protection:  Wear long pants, a long-sleeved shirt, and gloves  Use a skin block lotion to protect your skin from poison ivy oil  You can find this at a drugstore without a prescription  · Wash clothing after possible exposure: If you think you have been near a poison ivy plant, wash the clothes you were wearing separately from other clothes  Rinse the washing machine well after you take the clothes out  Scrub boots and shoes with warm, soapy water  Dry clean items and clothing that you cannot wash in water  Poison ivy oil is sticky and can stay on surfaces for a long time  It can cause a new rash even years later  · Bathe your pet:  Use warm water and shampoo on your pet's fur  This will prevent the spread of oil to your skin, car, and home  Wear long sleeves, long pants, and gloves while washing pets or any items that may have oil on them  · Reduce exposure to poison ivy:  Do not touch plants that look like poison ivy  Keep your yard free of poison ivy  While protecting your skin, remove the plant and the roots  Place them in a plastic bag and seal the bag tightly  · Do not burn poison ivy plants: This can spread the oil through the air  If you breathe the oil into your lungs, you could have swelling and serious breathing problems   Oil that clings to the fire kelsey can land on your skin and cause a rash  Contact your healthcare provider if:   · You have pus, soft yellow scabs, or tenderness on the rash  · The itching gets worse or keeps you awake at night  · The rash covers more than 1/4 of your skin or spreads to your eyes, mouth, or genital area  · The rash is not better after 2 to 3 weeks  · You have tender, swollen glands on the sides of your neck  · You have swelling in your arms and legs  · You have questions or concerns about your condition or care  Return to the emergency department if:   · You have a fever  · You have redness, swelling, and tenderness around the rash  · You have trouble breathing  © Copyright [employer] 2021 Information is for End User's use only and may not be sold, redistributed or otherwise used for commercial purposes  All illustrations and images included in CareNotes® are the copyrighted property of A D A M , Inc  or [facility]   The above information is an  only  It is not intended as medical advice for individual conditions or treatments  Talk to your doctor, nurse or pharmacist before following any medical regimen to see if it is safe and effective for you

## 2021-07-27 NOTE — PROGRESS NOTES
Weight Management Medical Nutrition Assessment  Franciscomarianna Suarezkelly is present for 4 of 6 bundle  Seen 2 months ago  Current wt:   307 8  lbs  Loss of   6 7  lbs x 2 month with overall loss of 24 2  lbs x 6 months  Of note she was recently started on Abilify  Continues to log with L[employer] It, avg 8401-7589 calories/day  Intake remains appropriate  Recently away for a mini vacation & did great! Finding that single serving foods work best for her vs measuring  She will f/u in ~4 months       Patient seen by Medical Provider in past 6 months:  no  Requested to schedule appointment with Medical Provider: No    Anthropometric Measurements  Start Weight (#): 332 lbs as per chart from 1/18/21; 335 lbs 2/10/21   Current wt: 307 8  lbs  Ideal Body Weight (#): 137 5 lbs    Goal Weight (#):  lbs  Highest: 332 lbs  Lowest: unknown     Weight Loss History  Previous weight loss attempts: Commercial Programs ([employer], Sandra Kelly, etc )    Food and Nutrition Related History  Wake up: 6-6:30   Bed Time: 9:30-11:00    Food Recall  Breakfast: 7:30: 1/2 cup whites, 1 egg, laughing cow cheese wedge, 1 piece 647 or 647 muffin, OR cheese, Bulgarian mehta, english muffin, 1 egg, 1/2 banana OR avocado toast OR 2 piece 880 bread, 2 slices mehta, 1/2 avocado, coffee  Snack: ~11:00 fruit OR skip  Lunch: 12-1:00: 2 oz turkey & 1 cheese s/w on 647 bread w/gonzales, protein snack mix (120, 12), sometimes rice pudding or greek yogurt  Snack: 3:00 cottage cheese/fruit or yogurt or pistachios  OR protein snack mix  Dinner: 5-6:00: 4 oz protein,  larger portions veggies OR turkey meatballs, 647 bun, side salad, 1 portion french fries OR frozen pizza 1x/wk OR 1 slice grilled cheese s/w on 647, sometimes tomato soup OR eggs, sausage/ham, potatoes  Snack: 7:00: ice cream, yasso  lillie ice cream w/strawberries    Beverages: water and coffee/tea  Volume of beverage intake: a lot of water, 2 travel mugs coffee w/3 tbsp FFcoffeemate french vanilla creamer Weekends: Same  Cravings: chocolate>>>better  Trouble area of day: evening    Frequency of Eating out:  on the w/e  Food restrictions: n/a  Cooking: self   Food Shopping: self    Physical Activity Intake  Activity:feeding barn animals and laying hay, gardening  Frequency:daily  Physical limitations/barriers to exercise: n/a    Estimated Needs  Energy  [employer] Needs: BMR : 2097   1-2# loss weekly sedentary:  3379-1226            1-2# loss weekly lightly active: 9574-8816  Maintenance calories for sedentary activity level: 2516  Protein: 76-95 gm     (1 2-1 5g/kg IBW)  Fluid: 74 oz   (35mL/kg IBW)    Nutrition Diagnosis  Yes;     Overweight/obesity  related to Excess energy intake as evidenced by  BMI more than normative standard for age and sex (obesity-grade III 36+)       Nutrition Intervention    Nutrition Prescription  Calories: 8296-9387  Protein: >85 gm    Meal Plan (Bennie/Pro)  Breakfast: 300-400, 20-30  Snack: 0-160, 0-30  Lunch: 350-450, 28  Snack: 150-200, 5-10  Dinner: 400-450, 28-42   Snack: 100-150, 5-10    Nutrition Education:    Calorie controlled menu  Lean protein food choices  Healthy snack options  Food journaling tips      Nutrition Counseling:  Strategies: meal planning, portion sizes, healthy snack choices, hydration, fiber intake, protein intake, exercise, food journal      Monitoring and Evaluation:  Evaluation criteria:  Energy Intake  Meet protein needs  Maintain adequate hydration  Monitor weekly weight  Meal planning/preparation  Food journal   Decreased portions at mealtimes and snacks  Physical activity     Barriers to learning:none  Readiness to change: Action:  (Changing behavior)  Comprehension: very good  Expected Compliance: very good

## 2021-08-02 ENCOUNTER — OFFICE VISIT (OUTPATIENT)
Dept: BARIATRICS | Facility: CLINIC | Age: 41
End: 2021-08-02

## 2021-08-02 VITALS — WEIGHT: 293 LBS | HEIGHT: 68 IN | BODY MASS INDEX: 44.41 KG/M2

## 2021-08-02 DIAGNOSIS — R63.5 ABNORMAL WEIGHT GAIN: Primary | ICD-10-CM

## 2021-08-02 PROCEDURE — RECHECK

## 2021-08-03 ENCOUNTER — OFFICE VISIT (OUTPATIENT)
Dept: OBGYN CLINIC | Facility: CLINIC | Age: 41
End: 2021-08-03
Payer: COMMERCIAL

## 2021-08-03 VITALS
BODY MASS INDEX: 47.09 KG/M2 | SYSTOLIC BLOOD PRESSURE: 140 MMHG | DIASTOLIC BLOOD PRESSURE: 98 MMHG | HEIGHT: 66 IN | WEIGHT: 293 LBS

## 2021-08-03 DIAGNOSIS — Z01.419 WOMEN'S ANNUAL ROUTINE GYNECOLOGICAL EXAMINATION: Primary | ICD-10-CM

## 2021-08-03 PROBLEM — R10.2 FEMALE PELVIC PAIN: Status: RESOLVED | Noted: 2018-01-17 | Resolved: 2021-08-03

## 2021-08-03 PROCEDURE — 3008F BODY MASS INDEX DOCD: CPT | Performed by: OBSTETRICS & GYNECOLOGY

## 2021-08-03 PROCEDURE — 99396 PREV VISIT EST AGE 40-64: CPT | Performed by: OBSTETRICS & GYNECOLOGY

## 2021-08-03 PROCEDURE — 1036F TOBACCO NON-USER: CPT | Performed by: OBSTETRICS & GYNECOLOGY

## 2021-08-03 PROCEDURE — G0145 SCR C/V CYTO,THINLAYER,RESCR: HCPCS | Performed by: OBSTETRICS & GYNECOLOGY

## 2021-08-03 NOTE — PATIENT INSTRUCTIONS
Vaginal Atrophy   WHAT YOU NEED TO KNOW:   What is vaginal atrophy? Vaginal atrophy is a condition that causes thinning, drying, and inflammation of vaginal tissue  This condition is caused by decreased levels of estrogen (a female sex hormone)  Vaginal atrophy can increase your risk for vaginal and urinary tract infections  Vaginal atrophy can worsen over time if not treated  What causes or increases your risk of vaginal atrophy? · Menopause     · Medicines that lower your estrogen levels, such as those used to treat breast cancer, endometriosis, or fibroids    · Radiation to your pelvic area     · Surgery to remove the ovaries    · Breastfeeding    What are the signs and symptoms of vaginal atrophy? · Vaginal dryness, itching, and burning    · Vaginal discharge    · Pain or discomfort during sex    · Light bleeding after sex    · Burning during urination    · Frequent, sudden, strong urges to urinate    · Urinary incontinence (loss of control of your bladder)    How is vaginal atrophy diagnosed? Your healthcare provider will ask about your symptoms  A pelvic exam will be done to examine your vagina and cervix  Your healthcare provider will place a speculum into your vagina to open and examine it  A sample of discharge from your vagina may be collected and tested  A urine test may also be done  How is vaginal atrophy treated? · Over-the counter vaginal moisturizers  can help reduce dryness  Your healthcare provider may recommend that you use a vaginal moisturizer several times each week and during sex  Only use creams that are made for vaginal use  Do  not  use petroleum jelly  Lubricants can be used during sex to decrease pain and discomfort  · Estrogen  may help decrease dryness  It may also lower your risk of vaginal infections if you are going through menopause  It can also help to relieve urinary symptoms  Estrogen may be prescribed in the form of a cream, tablet, or ring   These medicines can be applied or inserted into the vagina  Estrogen can also be prescribed in the form of a pill  When should I contact my healthcare provider? · You have a foul-smelling odor coming from your vagina  · You have a thick, cheese-like discharge from your vagina  · You have itching, swelling, or redness in your vagina  · You have pain or burning when you urinate  · Your urine smells bad  · Your symptoms do not improve, or they get worse  · You have questions or concerns about your condition or care  CARE AGREEMENT:   You have the right to help plan your care  Learn about your health condition and how it may be treated  Discuss treatment options with your healthcare providers to decide what care you want to receive  You always have the right to refuse treatment  The above information is an  only  It is not intended as medical advice for individual conditions or treatments  Talk to your doctor, nurse or pharmacist before following any medical regimen to see if it is safe and effective for you  © Copyright [employer] 2021 Information is for End User's use only and may not be sold, redistributed or otherwise used for commercial purposes   All illustrations and images included in CareNotes® are the copyrighted property of [employer] Inc  or [address] [place] [address] [employer]

## 2021-08-03 NOTE — PROGRESS NOTES
Assessment/Plan   Diagnoses and all orders for this visit:    Women's annual routine gynecological examination  -     Liquid-based pap, screening    1  Yearly exam-Pap smear done with HPV testing, self-breast awareness reviewed, calcium/vitamin-D recommendations discussed, mammogram request given  2  Status post laparotomy with supracervical hysterectomy-761 g uterus with fibroids was removed at surgery 6/18/19  She is healing well  She denies any problems  She will call with any issues  She denies any vaginal bleeding  3  Prior anemia-resolved since hysterectomy  4  Prior history of Mirena IUD/pelvic cramping/left adnexal tenderness/irregular bleeding-all reserved since surgery is done  5  Vaginal dryness with intercourse-doing lubrication with okay results  She was given vaginal lubrication/moisturizer sheet and she will use accordingly  6  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[employer], teaches chemistry, physics, and bio ethics  My support was given during the pandemic  Follow-up 1 year for yearly exam or as needed  Subjective   Patient ID: Sabiha Bailey is a 35 y o  female      Vitals:    08/03/21 1608   BP: 140/98     HPI    The following portions of the patient's history were reviewed and updated as appropriate: allergies, current medications, past family history, past medical history, past social history, past surgical history and problem list   Past Medical History:   Diagnosis Date    Allergic rhinitis     Anxiety     Depression     Family health problem     mother and grandmother h/o blood clots after surgery    Fibroid     Fullness in ear, left     Resolved [id number]    Hypertension     Resolved [id number]    Urinary tract infection     h/o     Past Surgical History:   Procedure Laterality Date    CHOLECYSTECTOMY      [place] TOTAL ABDOM HYSTERECTOMY N/A 6/18/2019    Procedure: Supracervical IWONA, removal of bilat tubes, removal of IUD, cystoscopy;  Surgeon: Jovita Ramirez MD; Location: [facility] OR;  Service: Gynecology    TOOTH EXTRACTION       OB History    Para Term  AB Living   0 0 0 0 0 0   SAB TAB Ectopic Multiple Live Births   0 0 0 0 0       Current Outpatient Medications:     ARIPiprazole (ABILIFY) 2 mg tablet, Take 1 tablet (2 mg total) by mouth daily, Disp: 30 tablet, Rfl: 5    Azelastine HCl 137 MCG/SPRAY SOLN, 1-2 puffs each nostril twice a day when necessary nasal congestion, Disp: 1 Bottle, Rfl: 11    calcium carbonate (OS-MAUREEN) 600 MG tablet, Take 600 mg by mouth daily, Disp: , Rfl:     Cetirizine HCl (ZYRTEC ALLERGY) 10 MG CAPS, Take 1 capsule by mouth daily as needed , Disp: , Rfl:     DULoxetine (CYMBALTA) 60 mg delayed release capsule, Take 1 capsule (60 mg total) by mouth daily, Disp: 90 capsule, Rfl: 1    ibuprofen (MOTRIN) 400 mg tablet, Take 400 mg by mouth every 6 (six) hours as needed, Disp: , Rfl:     LORazepam (ATIVAN) 0 5 mg tablet, Take 1 tablet (0 5 mg total) by mouth every 6 (six) hours as needed for anxiety, Disp: 30 tablet, Rfl: 0    montelukast (SINGULAIR) 10 mg tablet, Take 1 tablet (10 mg total) by mouth daily at bedtime, Disp: 30 tablet, Rfl: 11    predniSONE 10 mg tablet, Take 6 tablets on day 1; 5 on day 2; 4 on day 3; 3 on day 4; 2 on day 5; 1 on day 6  Take with food   (Patient not taking: Reported on 8/3/2021), Disp: 21 tablet, Rfl: 0    Tuberculin-Allergy Syringes 28G X 1/2" 0 5 ML MISC, Inject under the skin every 30 (thirty) days (Patient not taking: Reported on 2021), Disp: 50 each, Rfl: 0  Allergies   Allergen Reactions    Pollen Extract Allergic Rhinitis     Social History     Socioeconomic History    Marital status: /Civil Union     Spouse name: None    Number of children: None    Years of education: None    Highest education level: None   Occupational History    None   Tobacco Use    Smoking status: Never Smoker    Smokeless tobacco: Never Used   Vaping Use    Vaping Use: Never used   Substance and Sexual Activity    Alcohol use: Yes     Comment: occassional    Drug use: No    Sexual activity: Yes     Partners: Male     Birth control/protection: Female Sterilization   Other Topics Concern    None   Social History Narrative    Always uses seat belt    Caffeine use    [employer] ([employer])     Social Determinants of Health     Financial Resource Strain: Low Risk     Difficulty of Paying Living Expenses: Not hard at all   Food Insecurity: No Food Insecurity    Worried About Running Out of Food in the Last Year: Never true    Jason of Food in the Last Year: Never true   Transportation Needs: No Transportation Needs    Lack of Transportation (Medical): No    Lack of Transportation (Non-Medical): No   Physical Activity: Inactive    Days of Exercise per Week: 0 days    Minutes of Exercise per Session: 0 min   Stress: Stress Concern Present    Feeling of Stress : To some extent   Social Connections: Socially Integrated    Frequency of Communication with Friends and Family: More than three times a week    Frequency of Social Gatherings with Friends and Family: Once a week    Attends Judaism Services: More than 4 times per year    Active Member of [employer] Group or Organizations:  Yes    Attends Club or Organization Meetings: More than 4 times per year    Marital Status:    Intimate Partner Violence: Not At Risk    Fear of Current or Ex-Partner: No    Emotionally Abused: No    Physically Abused: No    Sexually Abused: No     Family History   Problem Relation Age of Onset    Anxiety disorder Mother     Other Mother         Blood clots    Hypertension Mother     Alcohol abuse Father     Hypertension Brother     Anxiety disorder Maternal Grandmother     Other Maternal Grandmother         Blood clots    Lung cancer Maternal Grandfather     Heart disease Family         cardiac disorder    Cancer Family         lung ca    Diabetes Family     Cancer Family     Ovarian cancer Maternal Aunt     Alcohol abuse Maternal Aunt 65    Prostate cancer Maternal Uncle        Review of Systems    Objective   Physical Exam  OBGyn Exam     Objective      /98 (BP Location: Left arm, Patient Position: Sitting, Cuff Size: Adult)   Ht 5' 6" (1 676 m)   Wt (!) 140 kg (308 lb)   LMP 05/15/2019 (Approximate)   BMI 49 71 kg/m²     General:   alert and oriented, in no acute distress   Neck: normal to inspection and palpation   Breast: normal appearance, no masses or tenderness   Heart:    Lungs:    Abdomen: soft, non-tender, without masses or organomegaly   Vulva: normal   Vagina: Without erythema or lesions or discharge  Normal   Cervix: Without lesions or discharge or cervicitis    No Cervical motion tenderness   Uterus: surgically absent   Adnexa: no mass, fullness, tenderness   Rectum: negative    Psych:  Normal mood and affect   Skin:  Without obvious lesions   Eyes: symmetric, with normal movements and reactivity   Musculoskeletal:  Normal muscle tone and movements appreciated

## 2021-08-09 LAB
LAB AP GYN PRIMARY INTERPRETATION: NORMAL
Lab: NORMAL

## 2021-08-11 ENCOUNTER — HOSPITAL ENCOUNTER (OUTPATIENT)
Dept: MAMMOGRAPHY | Facility: MEDICAL CENTER | Age: 41
Discharge: HOME | End: 2021-08-11
Payer: COMMERCIAL

## 2021-08-11 VITALS — BODY MASS INDEX: 47.09 KG/M2 | HEIGHT: 66 IN | WEIGHT: 293 LBS

## 2021-08-11 DIAGNOSIS — Z12.31 ENCOUNTER FOR SCREENING MAMMOGRAM FOR BREAST CANCER: ICD-10-CM

## 2021-08-11 PROCEDURE — 77067 SCR MAMMO BI INCL CAD: CPT

## 2021-08-11 PROCEDURE — 77063 BREAST TOMOSYNTHESIS BI: CPT

## 2021-08-30 ENCOUNTER — OFFICE VISIT (OUTPATIENT)
Dept: FAMILY MEDICINE CLINIC | Facility: CLINIC | Age: 41
End: 2021-08-30
Payer: COMMERCIAL

## 2021-08-30 VITALS
TEMPERATURE: 98.3 F | HEART RATE: 64 BPM | OXYGEN SATURATION: 96 % | BODY MASS INDEX: 47.09 KG/M2 | RESPIRATION RATE: 20 BRPM | SYSTOLIC BLOOD PRESSURE: 130 MMHG | HEIGHT: 66 IN | WEIGHT: 293 LBS | DIASTOLIC BLOOD PRESSURE: 90 MMHG

## 2021-08-30 DIAGNOSIS — Z00.00 ANNUAL PHYSICAL EXAM: Primary | ICD-10-CM

## 2021-08-30 DIAGNOSIS — F33.9 DEPRESSION, RECURRENT (HCC): ICD-10-CM

## 2021-08-30 DIAGNOSIS — R03.0 ELEVATED BP WITHOUT DIAGNOSIS OF HYPERTENSION: ICD-10-CM

## 2021-08-30 DIAGNOSIS — S16.1XXA STRAIN OF NECK MUSCLE, INITIAL ENCOUNTER: ICD-10-CM

## 2021-08-30 DIAGNOSIS — F41.1 GENERALIZED ANXIETY DISORDER: ICD-10-CM

## 2021-08-30 DIAGNOSIS — S46.812A STRAIN OF LEFT TRAPEZIUS MUSCLE, INITIAL ENCOUNTER: ICD-10-CM

## 2021-08-30 PROCEDURE — 3725F SCREEN DEPRESSION PERFORMED: CPT | Performed by: FAMILY MEDICINE

## 2021-08-30 PROCEDURE — 3008F BODY MASS INDEX DOCD: CPT | Performed by: FAMILY MEDICINE

## 2021-08-30 PROCEDURE — 99396 PREV VISIT EST AGE 40-64: CPT | Performed by: FAMILY MEDICINE

## 2021-08-30 PROCEDURE — 1036F TOBACCO NON-USER: CPT | Performed by: FAMILY MEDICINE

## 2021-08-30 RX ORDER — LORAZEPAM 0.5 MG/1
0.5 TABLET ORAL EVERY 6 HOURS PRN
Qty: 30 TABLET | Refills: 0 | Status: SHIPPED | OUTPATIENT
Start: 2021-08-30 | End: 2022-03-10 | Stop reason: SDUPTHER

## 2021-08-30 NOTE — PROGRESS NOTES
[facility]    NAME: Fransico Henderson  AGE: 35 y o  SEX: female  : 1980     DATE: 2021     Assessment and Plan:     Problem List Items Addressed This Visit        Other    Depression, recurrent ([address] )    Relevant Medications    LORazepam (ATIVAN) 0 5 mg tablet    Elevated BP without diagnosis of hypertension     The patient's blood pressure was mildly elevated in the office today  She is going to continue to work on her diet and exercise and on losing weight she will start checking her blood pressure at home will call with readings consistently greater than 150/90  We will see her back in the office as scheduled  Generalized anxiety disorder    Relevant Medications    LORazepam (ATIVAN) 0 5 mg tablet      Other Visit Diagnoses     Annual physical exam    -  Primary    Strain of neck muscle, initial encounter        Relevant Orders    Ambulatory referral to Physical Therapy    Strain of left trapezius muscle, initial encounter        Relevant Orders    Ambulatory referral to Physical Therapy      The patient is stable on her current medications  We will refer her to physical therapy as ordered to help with her left trapezius strain  She will continue work on her diet and exercise  We will see her back in the office as scheduled  Immunizations and preventive care screenings were discussed with patient today  Appropriate education was printed on patient's after visit summary  Counseling:  Dental Health: discussed importance of regular tooth brushing, flossing, and dental visits  Injury prevention: discussed safety/seat belts, safety helmets, smoke detectors, carbon dioxide detectors, and smoking near bedding or upholstery  · Exercise: the importance of regular exercise/physical activity was discussed  Recommend exercise 3-5 times per week for at least 30 minutes            Return in about 3 months (around 2021) for Recheck  Chief Complaint:     Chief Complaint   Patient presents with    Annual Exam     Pt c/o pain in left shoulder, neck, left arm  No injury  Wonders if it is stress, and if PT would help  Usually has a massage once a month but hasn't gone for the past two months  History of Present Illness:     Adult Annual Physical   Patient here for a comprehensive physical exam  The patient reports no problems  Julio Vargas is a 35 y o  female who presents today for a complete physical  she   has been feeling well and has no complaints today  The patient denies any chest pain, shortness of breath, or palpitations  There is no edema  There are no headaches or visual changes  There is no lightheadedness, dizziness, or fainting spells  There are no GI symptoms  The patient goes for dental exams every 6 months and sees her eye doctor  The patient is watching her diet and follows a regular exercise program    There is pain on the left side of the neck into the left shoulder and side  She has had this for several weeks  She has been stressed  The Abilify due to shaking and panic attacks  She is doing better  The depression has improved with the return to school  She is more active  She is getting back on her diet  Diet and Physical Activity  · Diet/Nutrition: well balanced diet, limited junk food, low calorie diet, low fat diet and consuming 3-5 servings of fruits/vegetables daily     · Exercise: walking  gardening     Depression Screening  PHQ-9 Depression Screening    PHQ-9:   Frequency of the following problems over the past two weeks:      Little interest or pleasure in doing things: 0 - not at all  Feeling down, depressed, or hopeless: 0 - not at all  Trouble falling or staying asleep, or sleeping too much: 0 - not at all  Feeling tired or having little energy: 1 - several days  Poor appetite or overeatin - not at all  Feeling bad about yourself - or that you are a failure or have let yourself or your family down: 0 - not at all  Trouble concentrating on things, such as reading the newspaper or watching television: 0 - not at all  Moving or speaking so slowly that other people could have noticed  Or the opposite - being so fidgety or restless that you have been moving around a lot more than usual: 0 - not at all  Thoughts that you would be better off dead, or of hurting yourself in some way: 0 - not at all  PHQ-2 Score: 0  PHQ-9 Score: 1       General Health  · Sleep: sleeps poorly  She is going to follow up with the sleep doctor  · Hearing: normal - bilateral   · Vision: no vision problems, goes for regular eye exams, most recent eye exam <1 year ago and wears contacts  · Dental: regular dental visits and brushes teeth twice daily  /GYN Health  · Patient is: premenopausal  · Saw GYN  Review of Systems:     Review of Systems   Constitutional: Negative  HENT: Negative  Eyes: Negative  Respiratory: Negative  Cardiovascular: Negative  Gastrointestinal: Negative  Endocrine: Negative  Genitourinary: Negative  Musculoskeletal: Negative  Skin: Negative  Allergic/Immunologic: Negative  Neurological: Negative  Hematological: Negative  Psychiatric/Behavioral: Negative         Past Medical History:     Past Medical History:   Diagnosis Date    Allergic rhinitis     Anxiety     Depression     Family health problem     mother and grandmother h/o blood clots after surgery    Fibroid     Fullness in ear, left     Resolved [id number]    Hypertension     Resolved [id number]    Urinary tract infection     h/o      Past Surgical History:     Past Surgical History:   Procedure Laterality Date    CHOLECYSTECTOMY      HYSTERECTOMY      [place] TOTAL ABDOM HYSTERECTOMY N/A 6/18/2019    Procedure: Supracervical IWONA, removal of bilat tubes, removal of IUD, cystoscopy;  Surgeon: Mateo Kasper MD;  Location: [facility];  Service: Gynecology    Age of Onset    Anxiety disorder Mother     Other Mother         Blood clots    Hypertension Mother     Alcohol abuse Father     Hypertension Brother     Anxiety disorder Maternal Grandmother     Other Maternal Grandmother         Blood clots    Lung cancer Maternal Grandfather     Heart disease Family         cardiac disorder    Cancer Family         lung ca    Diabetes Family     Cancer Family     Ovarian cancer Maternal Aunt 76    Alcohol abuse Maternal Aunt 72    Prostate cancer Maternal Uncle     No Known Problems Paternal Grandmother     No Known Problems Paternal Grandfather     No Known Problems Paternal Aunt       Current Medications:     Current Outpatient Medications   Medication Sig Dispense Refill    Azelastine HCl 137 MCG/SPRAY SOLN 1-2 puffs each nostril twice a day when necessary nasal congestion 1 Bottle 11    calcium carbonate (OS-MAUREEN) 600 MG tablet Take 600 mg by mouth daily      Cetirizine HCl (ZYRTEC ALLERGY) 10 MG CAPS Take 1 capsule by mouth daily as needed       DULoxetine (CYMBALTA) 60 mg delayed release capsule Take 1 capsule (60 mg total) by mouth daily 90 capsule 1    ibuprofen (MOTRIN) 400 mg tablet Take 400 mg by mouth every 6 (six) hours as needed      LORazepam (ATIVAN) 0 5 mg tablet Take 1 tablet (0 5 mg total) by mouth every 6 (six) hours as needed for anxiety 30 tablet 0    montelukast (SINGULAIR) 10 mg tablet Take 1 tablet (10 mg total) by mouth daily at bedtime 30 tablet 11     No current facility-administered medications for this visit  Allergies: Allergies   Allergen Reactions    Pollen Extract Allergic Rhinitis      Physical Exam:     /90   Pulse 64   Temp 98 3 °F (36 8 °C) (Tympanic)   Resp 20   Ht 5' 6" (1 676 m)   Wt (!) 140 kg (309 lb 9 6 oz)   LMP 05/15/2019 (Approximate)   SpO2 96%   BMI 49 97 kg/m²     Physical Exam  Constitutional:       General: She is not in acute distress  Appearance: She is well-developed   She is not diaphoretic  HENT:      Head: Normocephalic and atraumatic  Right Ear: External ear normal       Left Ear: External ear normal       Nose: Nose normal       Mouth/Throat:      Pharynx: No oropharyngeal exudate  Eyes:      General: No scleral icterus  Right eye: No discharge  Left eye: No discharge  Pupils: Pupils are equal, round, and reactive to light  Neck:      Thyroid: No thyromegaly  Vascular: No JVD  Trachea: No tracheal deviation  Cardiovascular:      Rate and Rhythm: Normal rate and regular rhythm  Heart sounds: Normal heart sounds  No murmur heard  No friction rub  No gallop  Pulmonary:      Effort: Pulmonary effort is normal  No respiratory distress  Breath sounds: Normal breath sounds  No wheezing or rales  Chest:      Chest wall: No tenderness  Abdominal:      General: Bowel sounds are normal  There is no distension  Palpations: Abdomen is soft  There is no mass  Tenderness: There is no abdominal tenderness  There is no guarding or rebound  Hernia: No hernia is present  Musculoskeletal:         General: No tenderness or deformity  Normal range of motion  Cervical back: Normal range of motion and neck supple  Lymphadenopathy:      Cervical: No cervical adenopathy  Skin:     General: Skin is warm and dry  Coloration: Skin is not pale  Findings: No erythema or rash  Neurological:      Mental Status: She is alert and oriented to person, place, and time  Cranial Nerves: No cranial nerve deficit  Sensory: No sensory deficit  Motor: No abnormal muscle tone  Coordination: Coordination normal       Deep Tendon Reflexes: Reflexes normal    Psychiatric:         Behavior: Behavior normal          Thought Content:  Thought content normal           Yuliya Sides, DO  [address]

## 2021-08-30 NOTE — PATIENT INSTRUCTIONS

## 2021-09-01 PROBLEM — R03.0 ELEVATED BP WITHOUT DIAGNOSIS OF HYPERTENSION: Status: ACTIVE | Noted: 2021-09-01

## 2021-09-01 NOTE — ASSESSMENT & PLAN NOTE
The patient's blood pressure was mildly elevated in the office today  She is going to continue to work on her diet and exercise and on losing weight she will start checking her blood pressure at home will call with readings consistently greater than 150/90  We will see her back in the office as scheduled

## 2021-11-29 ENCOUNTER — OFFICE VISIT (OUTPATIENT)
Dept: BARIATRICS | Facility: CLINIC | Age: 41
End: 2021-11-29

## 2021-11-29 ENCOUNTER — OFFICE VISIT (OUTPATIENT)
Dept: FAMILY MEDICINE CLINIC | Facility: CLINIC | Age: 41
End: 2021-11-29
Payer: COMMERCIAL

## 2021-11-29 VITALS
BODY MASS INDEX: 44.41 KG/M2 | SYSTOLIC BLOOD PRESSURE: 130 MMHG | RESPIRATION RATE: 20 BRPM | WEIGHT: 293 LBS | OXYGEN SATURATION: 95 % | HEART RATE: 90 BPM | TEMPERATURE: 98.1 F | DIASTOLIC BLOOD PRESSURE: 86 MMHG | HEIGHT: 68 IN

## 2021-11-29 VITALS — HEIGHT: 68 IN | BODY MASS INDEX: 44.41 KG/M2 | WEIGHT: 293 LBS

## 2021-11-29 DIAGNOSIS — F33.9 DEPRESSION, RECURRENT (HCC): Primary | ICD-10-CM

## 2021-11-29 DIAGNOSIS — S76.912A MUSCLE STRAIN OF LEFT THIGH, INITIAL ENCOUNTER: ICD-10-CM

## 2021-11-29 DIAGNOSIS — R63.5 ABNORMAL WEIGHT GAIN: Primary | ICD-10-CM

## 2021-11-29 DIAGNOSIS — E66.01 CLASS 3 SEVERE OBESITY DUE TO EXCESS CALORIES WITHOUT SERIOUS COMORBIDITY WITH BODY MASS INDEX (BMI) OF 45.0 TO 49.9 IN ADULT (HCC): ICD-10-CM

## 2021-11-29 DIAGNOSIS — F41.1 GENERALIZED ANXIETY DISORDER: ICD-10-CM

## 2021-11-29 PROBLEM — R03.0 ELEVATED BP WITHOUT DIAGNOSIS OF HYPERTENSION: Status: RESOLVED | Noted: 2021-09-01 | Resolved: 2021-11-29

## 2021-11-29 PROCEDURE — 3725F SCREEN DEPRESSION PERFORMED: CPT | Performed by: FAMILY MEDICINE

## 2021-11-29 PROCEDURE — RECHECK

## 2021-11-29 PROCEDURE — 1036F TOBACCO NON-USER: CPT | Performed by: FAMILY MEDICINE

## 2021-11-29 PROCEDURE — 99214 OFFICE O/P EST MOD 30 MIN: CPT | Performed by: FAMILY MEDICINE

## 2021-11-29 PROCEDURE — 3008F BODY MASS INDEX DOCD: CPT | Performed by: FAMILY MEDICINE

## 2021-11-30 PROBLEM — S76.912A MUSCLE STRAIN OF LEFT THIGH: Status: ACTIVE | Noted: 2021-11-30

## 2022-01-12 ENCOUNTER — TELEPHONE (OUTPATIENT)
Dept: FAMILY MEDICINE CLINIC | Facility: CLINIC | Age: 42
End: 2022-01-12

## 2022-01-12 DIAGNOSIS — G47.9 PRIMARY SLEEP DISTURBANCE: Primary | ICD-10-CM

## 2022-01-12 NOTE — TELEPHONE ENCOUNTER
Patient called  She asked if she could have an order for sleep study  She said she wants to go to [facility] sleep study  I told her I would let her know when the order is in  She said that this was discussed at one time  [phone]  Thank you

## 2022-03-10 ENCOUNTER — OFFICE VISIT (OUTPATIENT)
Dept: FAMILY MEDICINE CLINIC | Facility: CLINIC | Age: 42
End: 2022-03-10
Payer: COMMERCIAL

## 2022-03-10 VITALS
WEIGHT: 293 LBS | RESPIRATION RATE: 18 BRPM | SYSTOLIC BLOOD PRESSURE: 134 MMHG | HEART RATE: 68 BPM | TEMPERATURE: 98 F | BODY MASS INDEX: 44.41 KG/M2 | HEIGHT: 68 IN | OXYGEN SATURATION: 97 % | DIASTOLIC BLOOD PRESSURE: 90 MMHG

## 2022-03-10 DIAGNOSIS — F33.9 DEPRESSION, RECURRENT (HCC): Primary | ICD-10-CM

## 2022-03-10 DIAGNOSIS — F41.1 GENERALIZED ANXIETY DISORDER: ICD-10-CM

## 2022-03-10 PROCEDURE — 99214 OFFICE O/P EST MOD 30 MIN: CPT | Performed by: FAMILY MEDICINE

## 2022-03-10 PROCEDURE — 3008F BODY MASS INDEX DOCD: CPT | Performed by: FAMILY MEDICINE

## 2022-03-10 PROCEDURE — 1036F TOBACCO NON-USER: CPT | Performed by: FAMILY MEDICINE

## 2022-03-10 RX ORDER — LORAZEPAM 0.5 MG/1
0.5 TABLET ORAL EVERY 6 HOURS PRN
Qty: 30 TABLET | Refills: 0 | Status: SHIPPED | OUTPATIENT
Start: 2022-03-10 | End: 2022-06-23 | Stop reason: SDUPTHER

## 2022-03-10 NOTE — PROGRESS NOTES
Assessment/Plan:  Problem List Items Addressed This Visit        Other    Depression, recurrent ([address] ) - Primary     The patient is doing well on the current dose of her duloxetine  We did refill her p r n  lorazepam to use as needed very sparingly  She will continue with her current medication and was encouraged to continue to work on her diet and exercise  We will see her back in the office is scheduled for a physical          Relevant Medications    LORazepam (ATIVAN) 0 5 mg tablet    Generalized anxiety disorder    Relevant Medications    LORazepam (ATIVAN) 0 5 mg tablet          Return in about 6 months (around 8/31/2022) for Annual physical    I spent 20 minutes during the visit reviewing the history from the patient, performing the examination, discussing the findings with the patient, providing counseling and education, and making a plan  I spent 10 minutes ordering referrals and testing and documenting  Subjective:   Chief Complaint   Patient presents with    Follow-up     Routine 3 month check up  Pt reports no new issues, everythings goign pretty good  Patient ID: Nohemi Kidd is a 35 y o  female presents today for a routine checkup  Nohemi Kidd is a 35 y o  female presents today for follow-up of her depression and anxiety  She is checking her BP at home and it is in the normal range  The patient denies any chest pain, shortness of breath, or palpitations  There is no edema  There are no headaches or visual changes  There is no lightheadedness, dizziness, or fainting spells  She has not been tracking her diet and eating more fatty food  She has been busy with school  She is seeing the sleep doctor  The patient currently denies any nausea, vomiting, or GERD symptoms  she has normal bowel movements and normal urine output  she has a normal appetite  Her depression is better  She is planning a school trip  Depression  This is a chronic problem   The current episode started more than 1 year ago  The problem occurs constantly  The problem has been rapidly improving  Pertinent negatives include no abdominal pain, anorexia, arthralgias, change in bowel habit, chest pain, chills, congestion, coughing, diaphoresis, fatigue, fever, headaches, joint swelling, myalgias, nausea, neck pain, numbness, rash, sore throat, swollen glands, urinary symptoms, vertigo, visual change, vomiting or weakness       The following portions of the patient's history were reviewed and updated as appropriate: allergies, current medications, past family history, past medical history, past social history, past surgical history and problem list   Patient Active Problem List   Diagnosis    Depression, recurrent ([facility] )    Generalized anxiety disorder    S/P abdominal supracervical subtotal hysterectomy    Allergic rhinitis    Disturbance in sleep behavior    Class 3 severe obesity due to excess calories without serious comorbidity with body mass index (BMI) of 45 0 to 49 9 in adult ([facility] )    Muscle strain of left thigh     Past Medical History:   Diagnosis Date    Allergic rhinitis     Anxiety     Depression     Family health problem     mother and grandmother h/o blood clots after surgery    Fibroid     Fullness in ear, left     Resolved [id number]    Hypertension     Resolved [id number]    Urinary tract infection     h/o     Past Surgical History:   Procedure Laterality Date    CHOLECYSTECTOMY      HYSTERECTOMY      FL TOTAL ABDOM HYSTERECTOMY N/A 6/18/2019    Procedure: Supracervical IWONA, removal of bilat tubes, removal of IUD, cystoscopy;  Surgeon: Joby Santos MD;  Location: [facility] OR;  Service: Gynecology    TOOTH EXTRACTION       Allergies   Allergen Reactions    Pollen Extract Allergic Rhinitis     Family History   Problem Relation Age of Onset    Anxiety disorder Mother     Other Mother         Blood clots    Hypertension Mother     Alcohol abuse Father     Hypertension Brother  Anxiety disorder Maternal Grandmother     Other Maternal Grandmother         Blood clots    Lung cancer Maternal Grandfather     Heart disease Family         cardiac disorder    Cancer Family         lung ca    Diabetes Family     Cancer Family     Ovarian cancer Maternal Aunt 76    Alcohol abuse Maternal Aunt 72    Prostate cancer Maternal Uncle     No Known Problems Paternal Grandmother     No Known Problems Paternal Grandfather     No Known Problems Paternal Aunt      Social History     Socioeconomic History    Marital status: /Civil Union     Spouse name: Not on file    Number of children: Not on file    Years of education: Not on file    Highest education level: Not on file   Occupational History    Not on file   Tobacco Use    Smoking status: Never Smoker    Smokeless tobacco: Never Used   Vaping Use    Vaping Use: Never used   Substance and Sexual Activity    Alcohol use: Yes     Comment: occassional    Drug use: No    Sexual activity: Yes     Partners: Male     Birth control/protection: Female Sterilization   Other Topics Concern    Not on file   Social History Narrative    Always uses seat belt    Caffeine use    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[employer])     Social Determinants of Health     Financial Resource Strain: Not on file   Food Insecurity: Not on file   Transportation Needs: Not on file   Physical Activity: Not on file   Stress: Not on file   Social Connections: Socially Integrated    Frequency of Communication with Friends and Family: More than three times a week    Frequency of Social Gatherings with Friends and Family: Once a week    Attends Restorationism Services: More than 4 times per year    Active Member of [employer] or Organizations: Yes    Attends Club or Organization Meetings: More than 4 times per year    Marital Status:    Intimate Partner Violence: Not At Risk    Fear of Current or Ex-Partner: No    Emotionally Abused: No    Physically Abused:  No  Sexually Abused: No   Housing Stability: Not on file     Current Outpatient Medications on File Prior to Visit   Medication Sig Dispense Refill    Azelastine HCl 137 MCG/SPRAY SOLN 1-2 puffs each nostril twice a day when necessary nasal congestion 90 mL 3    calcium carbonate (OS-MAUREEN) 600 MG tablet Take 600 mg by mouth daily      Cetirizine HCl (ZYRTEC ALLERGY) 10 MG CAPS Take 1 capsule by mouth daily as needed       DULoxetine (CYMBALTA) 60 mg delayed release capsule Take 1 capsule (60 mg total) by mouth daily 90 capsule 1    ibuprofen (MOTRIN) 400 mg tablet Take 400 mg by mouth every 6 (six) hours as needed      mometasone (ELOCON) 0 1 % lotion One drop affected ear canal daily at bedtime when necessary itching 30 mL 0    montelukast (SINGULAIR) 10 mg tablet Take 1 tablet (10 mg total) by mouth daily at bedtime 90 tablet 3    Tuberculin-Allergy Syringes (ALLERGY SYRINGE 1CC/27GX1/2") 27G X 1/2" 1 ML MISC by Subcutaneous (multi inj) route once a week 1 box of 100 ct 100 each 1    EPINEPHrine (EPIPEN) 0 3 mg/0 3 mL SOAJ Inject 0 3 mL (0 3 mg total) into a muscle once for 1 dose 0 6 mL 0     No current facility-administered medications on file prior to visit  Review of Systems   Constitutional: Negative  Negative for chills, diaphoresis, fatigue and fever  HENT: Negative  Negative for congestion and sore throat  Eyes: Negative  Respiratory: Negative  Negative for cough  Cardiovascular: Negative  Negative for chest pain  Gastrointestinal: Negative  Negative for abdominal pain, anorexia, change in bowel habit, nausea and vomiting  Endocrine: Negative  Genitourinary: Negative  Musculoskeletal: Negative  Negative for arthralgias, joint swelling, myalgias and neck pain  Skin: Negative  Negative for rash  Allergic/Immunologic: Negative  Neurological: Negative  Negative for vertigo, weakness, numbness and headaches  Hematological: Negative      Psychiatric/Behavioral: Positive for depression  Objective:  Vitals:    03/10/22 1559 03/10/22 1620   BP: 140/98 134/90   BP Location: Left arm    Patient Position: Sitting    Cuff Size: Large    Pulse: 91 68   Resp: 18    Temp: 98 °F (36 7 °C)    TempSrc: Tympanic    SpO2: 97%    Weight: (!) 143 kg (314 lb 12 8 oz)    Height: 5' 7 5" (1 715 m)      Body mass index is 48 58 kg/m²  Physical Exam  Constitutional:       Appearance: She is well-developed  HENT:      Head: Normocephalic and atraumatic  Mouth/Throat:      Pharynx: No oropharyngeal exudate  Eyes:      Conjunctiva/sclera: Conjunctivae normal       Pupils: Pupils are equal, round, and reactive to light  Neck:      Thyroid: No thyromegaly  Vascular: No JVD  Trachea: No tracheal deviation  Cardiovascular:      Rate and Rhythm: Normal rate and regular rhythm  Heart sounds: Normal heart sounds  No murmur heard  No friction rub  No gallop  Pulmonary:      Effort: Pulmonary effort is normal  No respiratory distress  Breath sounds: Normal breath sounds  No stridor  No wheezing or rales  Chest:      Chest wall: No tenderness  Abdominal:      General: Bowel sounds are normal  There is no distension  Palpations: Abdomen is soft  There is no mass  Tenderness: There is no abdominal tenderness  There is no guarding or rebound  Musculoskeletal:         General: No tenderness or deformity  Normal range of motion  Cervical back: Normal range of motion  Lymphadenopathy:      Cervical: No cervical adenopathy  Skin:     General: Skin is warm and dry  Neurological:      Mental Status: She is alert and oriented to person, place, and time  Cranial Nerves: No cranial nerve deficit  Motor: No abnormal muscle tone  Coordination: Coordination normal       Deep Tendon Reflexes: Reflexes are normal and symmetric  Reflexes normal            BMI Counseling: Body mass index is 48 58 kg/m²   The BMI is above normal  Nutrition recommendations include decreasing portion sizes, encouraging healthy choices of fruits and vegetables, decreasing fast food intake, consuming healthier snacks, limiting drinks that contain sugar, increasing intake of lean protein, reducing intake of saturated and trans fat and reducing intake of cholesterol  Exercise recommendations include exercising 3-5 times per week and strength training exercises  No pharmacotherapy was ordered  Patient referred to PCP  Rationale for BMI follow-up plan is due to patient being overweight or obese

## 2022-03-14 NOTE — ASSESSMENT & PLAN NOTE
The patient is doing well on the current dose of her duloxetine  We did refill her p r n  lorazepam to use as needed very sparingly  She will continue with her current medication and was encouraged to continue to work on her diet and exercise    We will see her back in the office is scheduled for a physical

## 2022-03-23 DIAGNOSIS — F32.A DEPRESSION, UNSPECIFIED DEPRESSION TYPE: ICD-10-CM

## 2022-03-23 DIAGNOSIS — F41.1 GENERALIZED ANXIETY DISORDER: ICD-10-CM

## 2022-03-25 RX ORDER — DULOXETIN HYDROCHLORIDE 60 MG/1
60 CAPSULE, DELAYED RELEASE ORAL DAILY
Qty: 90 CAPSULE | Refills: 0 | Status: SHIPPED | OUTPATIENT
Start: 2022-03-25 | End: 2022-06-23 | Stop reason: SDUPTHER

## 2022-05-11 ENCOUNTER — OFFICE VISIT (OUTPATIENT)
Dept: SLEEP CENTER | Facility: CLINIC | Age: 42
End: 2022-05-11
Payer: COMMERCIAL

## 2022-05-11 VITALS
BODY MASS INDEX: 44.41 KG/M2 | DIASTOLIC BLOOD PRESSURE: 93 MMHG | WEIGHT: 293 LBS | HEIGHT: 68 IN | SYSTOLIC BLOOD PRESSURE: 140 MMHG | HEART RATE: 117 BPM

## 2022-05-11 DIAGNOSIS — E66.01 CLASS 3 SEVERE OBESITY DUE TO EXCESS CALORIES WITHOUT SERIOUS COMORBIDITY WITH BODY MASS INDEX (BMI) OF 45.0 TO 49.9 IN ADULT (HCC): ICD-10-CM

## 2022-05-11 DIAGNOSIS — G47.00 INSOMNIA, UNSPECIFIED TYPE: ICD-10-CM

## 2022-05-11 DIAGNOSIS — G47.19 EXCESSIVE DAYTIME SLEEPINESS: ICD-10-CM

## 2022-05-11 DIAGNOSIS — G47.33 OBSTRUCTIVE SLEEP APNEA-HYPOPNEA SYNDROME: Primary | ICD-10-CM

## 2022-05-11 DIAGNOSIS — G47.9 PRIMARY SLEEP DISTURBANCE: ICD-10-CM

## 2022-05-11 DIAGNOSIS — R35.1 NOCTURIA: ICD-10-CM

## 2022-05-11 PROCEDURE — 1036F TOBACCO NON-USER: CPT | Performed by: NURSE PRACTITIONER

## 2022-05-11 PROCEDURE — 3008F BODY MASS INDEX DOCD: CPT | Performed by: NURSE PRACTITIONER

## 2022-05-11 PROCEDURE — 99204 OFFICE O/P NEW MOD 45 MIN: CPT | Performed by: NURSE PRACTITIONER

## 2022-05-11 NOTE — PATIENT INSTRUCTIONS
Schedule home sleep study  Schedule set up of CPAP equipment  Schedule compliance visit 31-91 days after beginning use of equipment     Nursing Support:  When: Monday through Friday 7A-5PM except holidays  Where: Our direct line is [phone]  If you are having a true emergency please call 911  In the event that the line is busy or it is after hours please leave a voice message and we will return your call  Please speak clearly, leaving your full name, birth date, best number to reach you and the reason for your call  Medication refills: We will need the name of the medication, the dosage, the ordering provider, whether you get a 30 or 90 day refill, and the pharmacy name and address  Medications will be ordered by the provider only  Nurses cannot call in prescriptions  Please allow 7 days for medication refills  Physician requested updates: If your provider requested that you call with an update after starting medication, please be ready to provide us the medication and dosage, what time you take your medication, the time you attempt to fall asleep, time you fall asleep, when you wake up, and what time you get out of bed  Sleep Study Results: We will contact you with sleep study results and/or next steps after the physician has reviewed your testing

## 2022-05-11 NOTE — PROGRESS NOTES
Consultation - Zulema  [address], 1980, MRN: [id number]    5/11/2022        Reason for Consult / Principal Problem:    Evaluation of possible Obstructive Sleep Apnea  Excessive daytime sleepiness  Sleep maintenance insomnia  Nocturia       Thank you for the opportunity of participating in the evaluation and care of this patient in the Sleep Clinic at [facility]  Subjective:     HPI: Jayde Griffin is a 35y o  year old female  She presents for a consultation regarding concern of possible obstructive sleep apnea  She reports that she experiences daytime sleepiness every day  She does not have difficulty initiating sleep, but sleep is very restless  She awakens 2-3 times per night for urination  She is able to return to sleep without difficulty  She states that when she awakens in the morning, she feels like she hasn't slept at all  She completed a home sleep study in 2016, which did not indicate sleep apnea  She was advised to lose weight and continue to treat her anxiety and depression  She states that she has gained weight since the time of her study  BMI at that time was 44 7  Comorbid conditions: Morbid Obesity  Anxiety, currently controlled with medication  Depression, currently controlled with medication    Review of Systems      Genitourinary need to urinate more than twice a night and pregnant   Cardiology none   Gastrointestinal none   Neurology frequent headaches and numbness/tingling of an extremity   Constitutional fatigue   Integumentary none   Psychiatry anxiety and depression   Musculoskeletal muscle aches   Pulmonary snoring   ENT none   Endocrine none   Hematological none       Sleep Study Results:  A home sleep study was completed in 2016    DOMINGUEZ was 1 5 with oxygen dante of 88%    Employment:  She currently works full time as a teacher, working between the hours of 6: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-4: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 with evening work at home for at least an hour with some weekend work as well  Sleep Schedule:       Bedtime:  9:00pm on work days and 10:00pm on weekends      Latency:  30 minutes or less      Wakeup time:  5:00am on work days and 6:45am on weekends    Awakenings:       Frequency:  2-3 times per night      Causes: For urination 2-3 times, sometimes noises, unknown causes      Duration:  Returns to sleep without difficulty    Daytime Sleepiness / Inappropriate Sleep:       Most severe:  Tired all day but especially afternoons       Naps :  weekends      Time:  afternoon      Duration:  Up to 3 hours       Inappropriate drowsiness / sleep: On weekends, if sedentary    Snoring:  She snores loudly with restlessness and repositioning    Apnea: No witnessed apnea    Change in Weight:  She lost 30 lbs, but has regained 20 lbs  Restless Leg Syndrome:  no clinical symptoms consistent with this diagnosis     Other Complaints:  She talks in her sleep at times  No reports of sleep walking, sleep paralysis or hallucinations surrounding sleep  She does not awaken with headaches  No reports of bruxism  Social History:      Caffeine:  She drinks two 20-24 ounces of coffee daily in the am   Occasional afternoon caffeine  Tobacco:   reports that she has never smoked  She has never used smokeless tobacco      E-cig/Vaping:    E-Cigarette/Vaping    E-Cigarette Use Never User       E-Cigarette/Vaping Substances    Nicotine No     THC No     CBD No     Flavoring No     Other No     Unknown No          Alcohol:   reports current alcohol use  rarely      Drugs:   reports no history of drug use         The review of systems and following portions of the patient's history were reviewed and updated as appropriate: allergies, current medications, past family history, past medical history, past social history, past surgical history and problem list         Objective:       Vitals:    05/11/22 1408   BP: 140/93   BP Location: Left arm   Patient Position: Sitting   Cuff Size: Large   Pulse: (!) 117   Weight: (!) 143 kg (316 lb)   Height: 5' 8 4" (1 737 m)     Body mass index is 47 49 kg/m²  Neck Circumference: 16  [place] Sleepiness Scale:  Total score: 16      Current Outpatient Medications:     Azelastine HCl 137 MCG/SPRAY SOLN, 1-2 puffs each nostril twice a day when necessary nasal congestion, Disp: 90 mL, Rfl: 3    calcium carbonate (OS-MAUREEN) 600 MG tablet, Take 600 mg by mouth daily, Disp: , Rfl:     Cetirizine HCl 10 MG CAPS, Take 1 capsule by mouth daily as needed , Disp: , Rfl:     DULoxetine (CYMBALTA) 60 mg delayed release capsule, Take 1 capsule (60 mg total) by mouth daily, Disp: 90 capsule, Rfl: 0    ibuprofen (MOTRIN) 400 mg tablet, Take 400 mg by mouth every 6 (six) hours as needed, Disp: , Rfl:     LORazepam (ATIVAN) 0 5 mg tablet, Take 1 tablet (0 5 mg total) by mouth every 6 (six) hours as needed for anxiety, Disp: 30 tablet, Rfl: 0    montelukast (SINGULAIR) 10 mg tablet, Take 1 tablet (10 mg total) by mouth daily at bedtime, Disp: 90 tablet, Rfl: 3    Tuberculin-Allergy Syringes (ALLERGY SYRINGE 1CC/27GX1/2") 27G X 1/2" 1 ML MISC, by Subcutaneous (multi inj) route once a week 1 box of 100 ct, Disp: 100 each, Rfl: 1    EPINEPHrine (EPIPEN) 0 3 mg/0 3 mL SOAJ, Inject 0 3 mL (0 3 mg total) into a muscle once for 1 dose, Disp: 0 6 mL, Rfl: 0    mometasone (ELOCON) 0 1 % lotion, One drop affected ear canal daily at bedtime when necessary itching (Patient not taking: Reported on 5/11/2022), Disp: 30 mL, Rfl: 0    Physical Exam  General Appearance:   Alert, cooperative, no distress, appears stated age, obese     Head:   Normocephalic, without obvious abnormality, atraumatic     Eyes:   PERRL, conjunctiva/corneas clear          Nose:  Nares normal, septum midline, mucosa normal, no drainage or sinus tenderness           Throat:  Lips, teeth and gums normal; tongue normal in size and midline in position; mucosa moist and redundant bilaterally, uvula normal, tonsils not visualized, Mallampati class 3       Neck:  Supple, symmetrical, trachea midline, no adenopathy; no thyromegaly noted, no carotid bruit or JVD     Lungs:      Clear to auscultation bilaterally, respirations unlabored     Heart:   Tachycardic rate and regular rhythm, S1 and S2 normal, no murmur, rub or gallop       Extremities:  Extremities normal, atraumatic, no cyanosis or edema       Skin:  Skin color, texture, turgor normal, no rashes or lesions       Neurologic:  No focal deficits noted  ASSESSMENT / PLAN     1  Obstructive sleep apnea-hypopnea syndrome  Home Study   2  Insomnia, unspecified type  Home Study   3  Excessive daytime sleepiness  Home Study   4  Primary sleep disturbance  Ambulatory Referral to Sleep Medicine    Home Study   5  Class 3 severe obesity due to excess calories without serious comorbidity with body mass index (BMI) of 45 0 to 49 9 in [facility])  Home Study         Counseling / Coordination of Care  Total clinic time spent today 55 minutes  Greater than 50% of total time was spent with the patient and / or family counseling and / or coordination of care  A description of the counseling / coordination of care:     diagnostic results, instructions for management, risk factor reductions, prognosis, patient and family education, impressions, risks and benefits of treatment options and importance of compliance with treatment    Today we discussed the anatomy and physiology of the upper airway  I pointed out how changes in this region can result in both snoring and abnormal breathing events including apneas and hypopneas  I explained the most common co-morbidities of untreated sleep apnea  After this we talked about some forms of treatment including application of positive airway pressure, mandibular advancement devices and surgery       In order to evaluate the possibility of Obstructive Sleep Apnea as a cause of the patient's symptoms, a home sleep study will be completed to identify the presence or absence of abnormal nocturnal breathing  If significant abnormal nocturnal breathing is detected, nasal CPAP will be titrated to find the optimum pressure needed to maintain upper airway patency during sleep  This may be accomplished using APAP or may require an in lab titration study  Following testing, the patient will return to the [address] for set up of CPAP equipment with follow up visit to review compliance and effectiveness of treatment 31-91 days after set up  We discussed her elevated heart rate  Rhythm is regular  She is asymptomatic  She wears a fitbit and reports that her resting heart rate is typically in the 70's  When working it is typically in the 90's  She was advised to monitor and if it remains elevated or she becomes symptomatic with shortness of breath, dizziness, diaphoresis, chest pain, she should seek medical attention, either with her PCP or ER  The following instructions have been given to the patient today:    Patient Instructions   1  Schedule home sleep study  2  Schedule set up of CPAP equipment  3  Schedule compliance visit 31-91 days after beginning use of equipment     Nursing Support:  When: Monday through Friday 7A-5PM except holidays  Where: Our direct line is [phone]  If you are having a true emergency please call 911  In the event that the line is busy or it is after hours please leave a voice message and we will return your call  Please speak clearly, leaving your full name, birth date, best number to reach you and the reason for your call  Medication refills: We will need the name of the medication, the dosage, the ordering provider, whether you get a 30 or 90 day refill, and the pharmacy name and address  Medications will be ordered by the provider only  Nurses cannot call in prescriptions  Please allow 7 days for medication refills    Physician requested updates: If your provider requested that you call with an update after starting medication, please be ready to provide us the medication and dosage, what time you take your medication, the time you attempt to fall asleep, time you fall asleep, when you wake up, and what time you get out of bed  Sleep Study Results: We will contact you with sleep study results and/or next steps after the physician has reviewed your testing        Heather Branch, [id number]

## 2022-05-22 ENCOUNTER — HOSPITAL ENCOUNTER (OUTPATIENT)
Dept: SLEEP CENTER | Facility: CLINIC | Age: 42
Discharge: HOME | End: 2022-05-22

## 2022-05-22 DIAGNOSIS — G47.00 INSOMNIA, UNSPECIFIED TYPE: ICD-10-CM

## 2022-05-22 DIAGNOSIS — E66.01 CLASS 3 SEVERE OBESITY DUE TO EXCESS CALORIES WITHOUT SERIOUS COMORBIDITY WITH BODY MASS INDEX (BMI) OF 45.0 TO 49.9 IN ADULT (HCC): ICD-10-CM

## 2022-05-22 DIAGNOSIS — G47.33 OBSTRUCTIVE SLEEP APNEA-HYPOPNEA SYNDROME: ICD-10-CM

## 2022-05-22 DIAGNOSIS — G47.9 PRIMARY SLEEP DISTURBANCE: ICD-10-CM

## 2022-05-22 DIAGNOSIS — G47.19 EXCESSIVE DAYTIME SLEEPINESS: ICD-10-CM

## 2022-05-23 NOTE — PROGRESS NOTES
Home Sleep Study Documentation    Pre-Sleep Home Study:    Set-up and instructions performed by: Charo PEREZ    Technician performed demonstration for Patient: yes    Return demonstration performed by Patient: yes    Written instructions provided to Patient: yes    Patient signed consent form: yes        Post-Sleep Home Study:    Failed study- bad Nasal cannula signal    Home Sleep Study Failed:yes:     Failure reason: Failed Study sensor     Reported or Detected: detected    Scored by: TANIKA Leonard RPSGT

## 2022-05-31 ENCOUNTER — TELEPHONE (OUTPATIENT)
Dept: BARIATRICS | Facility: CLINIC | Age: 42
End: 2022-05-31

## 2022-06-10 ENCOUNTER — HOSPITAL ENCOUNTER (OUTPATIENT)
Dept: SLEEP CENTER | Facility: CLINIC | Age: 42
Discharge: HOME | End: 2022-06-10
Payer: COMMERCIAL

## 2022-06-10 PROCEDURE — G0399 HOME SLEEP TEST/TYPE 3 PORTA: HCPCS

## 2022-06-11 NOTE — PROGRESS NOTES
Home Sleep Study Documentation    HOME STUDY DEVICE: Noxturnal yes                                           Yaneth G3 no      Pre-Sleep Home Study:    Set-up and instructions performed by: ROLANDA    Technician performed demonstration for Patient: yes    Return demonstration performed by Patient: yes    Written instructions provided to Patient: yes    Patient signed consent form: yes        Post-Sleep Home Study:    Additional comments by Patient: none    Home Sleep Study Failed:no:    Failure reason: N/A    Reported or Detected: N/A    Scored by: TANIKA Alcaraz, OMKARGT

## 2022-06-14 DIAGNOSIS — G47.33 OBSTRUCTIVE SLEEP APNEA-HYPOPNEA SYNDROME: Primary | ICD-10-CM

## 2022-06-14 PROCEDURE — 95806 SLEEP STUDY UNATT&RESP EFFT: CPT | Performed by: INTERNAL MEDICINE

## 2022-06-20 ENCOUNTER — TELEPHONE (OUTPATIENT)
Dept: SLEEP CENTER | Facility: CLINIC | Age: 42
End: 2022-06-20

## 2022-06-20 NOTE — TELEPHONE ENCOUNTER
Called patient and advised sleep study resulted and shows moderate SID  APAP ordered  Scheduled DME set up 7/5/22 in [place]  Already has compliance follow up scheduled 9/22/22

## 2022-06-23 DIAGNOSIS — F32.A DEPRESSION, UNSPECIFIED DEPRESSION TYPE: ICD-10-CM

## 2022-06-23 DIAGNOSIS — F41.1 GENERALIZED ANXIETY DISORDER: ICD-10-CM

## 2022-06-23 RX ORDER — DULOXETIN HYDROCHLORIDE 60 MG/1
60 CAPSULE, DELAYED RELEASE ORAL DAILY
Qty: 90 CAPSULE | Refills: 0 | Status: SHIPPED | OUTPATIENT
Start: 2022-06-23

## 2022-06-23 RX ORDER — LORAZEPAM 0.5 MG/1
0.5 TABLET ORAL EVERY 6 HOURS PRN
Qty: 30 TABLET | Refills: 0 | Status: SHIPPED | OUTPATIENT
Start: 2022-06-23

## 2022-06-29 ENCOUNTER — OFFICE VISIT (OUTPATIENT)
Dept: URGENT CARE | Facility: CLINIC | Age: 42
End: 2022-06-29
Payer: COMMERCIAL

## 2022-06-29 VITALS — HEART RATE: 89 BPM | TEMPERATURE: 98.4 F | RESPIRATION RATE: 18 BRPM | OXYGEN SATURATION: 97 %

## 2022-06-29 DIAGNOSIS — L25.5 RHUS DERMATITIS: Primary | ICD-10-CM

## 2022-06-29 PROCEDURE — 99212 OFFICE O/P EST SF 10 MIN: CPT | Performed by: PHYSICIAN ASSISTANT

## 2022-06-29 RX ORDER — PREDNISONE 10 MG/1
TABLET ORAL
Qty: 21 TABLET | Refills: 0 | Status: SHIPPED | OUTPATIENT
Start: 2022-06-29

## 2022-06-29 NOTE — PROGRESS NOTES
[employer]    NAME: Helen Zuniga is a 38 y o  female  : 1980    MRN: [id number]  DATE: 2022  TIME: 1:56 PM    Assessment and Plan   Rhus dermatitis [L25 5]  1  Rhus dermatitis  predniSONE 10 mg tablet       Patient Instructions   Patient Instructions   Poison Ivy   WHAT YOU NEED TO KNOW:   Poison ivy is a plant that can cause an itchy, uncomfortable rash on your skin  Poison ivy grows as a shrub or vine in woods, fields, and areas of thick [place]  It has 3 bright green leaves on each stem that turn red in renay  DISCHARGE INSTRUCTIONS:   Medicines:   · Antiseptic or drying creams or ointments: These medicines may be used to dry out the rash and decrease the itching  These products may be available without a doctor's order  · Steroids: This medicine helps decrease itching and inflammation  It can be given as a cream to apply to your skin or as a pill  · Antihistamines: This medicine may help decrease itching and help you sleep  It is available without a doctor's order  · Take your medicine as directed  Contact your healthcare provider if you think your medicine is not helping or if you have side effects  Tell him of her if you are allergic to any medicine  Keep a list of the medicines, vitamins, and herbs you take  Include the amounts, and when and why you take them  Bring the list or the pill bottles to follow-up visits  Carry your medicine list with you in case of an emergency  Follow up with your doctor as directed:  Write down your questions so you remember to ask them during your visits  How your poison ivy rash spreads: You cannot spread poison ivy by touching your rash or the liquid from your blisters  Poison ivy is spread only if you scratch your skin while it still has oil on it  You may think your rash is spreading because new rashes appear over a number of days   This happens because areas covered by thin skin break out in a rash first  Your face or forearms may develop a rash before thicker areas, such as the palms of your hands  Self-care:   · Keep your rash clean and dry:  Wash it with soap and water  Gently pat it dry with a clean towel  · Try not to scratch or rub your rash: This can cause your skin to become infected  · Use a compress on your rash:  Dip a clean washcloth in cool water  Wring it out and place it on your rash  Leave the washcloth on your skin for 15 minutes  Do this at least 3 times per day  · Take a cornstarch or oatmeal bath: If your rash is too large to cover with wet washcloths, take 3 or 4 cornstarch baths daily  Mix 1 pound of cornstarch with a little water to make a paste  Add the paste to a tub full of water and mix well  You may also use colloidal oatmeal in the bath water  Use lukewarm water  Avoid hot water because it may cause your itching to increase  Prevent a poison ivy rash in the future:   · Wear skin protection:  Wear long pants, a long-sleeved shirt, and gloves  Use a skin block lotion to protect your skin from poison ivy oil  You can find this at a drugstore without a prescription  · Wash clothing after possible exposure: If you think you have been near a poison ivy plant, wash the clothes you were wearing separately from other clothes  Rinse the washing machine well after you take the clothes out  Scrub boots and shoes with warm, soapy water  Dry clean items and clothing that you cannot wash in water  Poison ivy oil is sticky and can stay on surfaces for a long time  It can cause a new rash even years later  · Bathe your pet:  Use warm water and shampoo on your pet's fur  This will prevent the spread of oil to your skin, car, and home  Wear long sleeves, long pants, and gloves while washing pets or any items that may have oil on them  · Reduce exposure to poison ivy:  Do not touch plants that look like poison ivy  Keep your yard free of poison ivy   While protecting your skin, remove the plant and the roots  Place them in a plastic bag and seal the bag tightly  · Do not burn poison ivy plants: This can spread the oil through the air  If you breathe the oil into your lungs, you could have swelling and serious breathing problems  Oil that clings to the fire kelsey can land on your skin and cause a rash  Contact your healthcare provider if:   · You have pus, soft yellow scabs, or tenderness on the rash  · The itching gets worse or keeps you awake at night  · The rash covers more than 1/4 of your skin or spreads to your eyes, mouth, or genital area  · The rash is not better after 2 to 3 weeks  · You have tender, swollen glands on the sides of your neck  · You have swelling in your arms and legs  · You have questions or concerns about your condition or care  Return to the emergency department if:   · You have a fever  · You have redness, swelling, and tenderness around the rash  · You have trouble breathing  © Copyright [employer] 2022 Information is for End User's use only and may not be sold, redistributed or otherwise used for commercial purposes  All illustrations and images included in CareNotes® are the copyrighted property of A D A M , Inc  or [facility] [employer]   The above information is an  only  It is not intended as medical advice for individual conditions or treatments  Talk to your doctor, nurse or pharmacist before following any medical regimen to see if it is safe and effective for you  Chief Complaint     Chief Complaint   Patient presents with    Rash     Patient c/o a rash on her face and arms x 1 day       History of Present Illness   Jayde Griffin presents to the clinic c/o  49-year-old female comes in with itchy rash that started on her face and arms and seems to be spreading  Using over-the-counter topical without much relief  Gets poison ivy very easily  Has been working outside        Review of Systems   Review of Systems Constitutional: Negative  Respiratory: Negative  Cardiovascular: Negative  Skin: Positive for rash         Current Medications     Long-Term Medications   Medication Sig Dispense Refill    calcium carbonate (OS-MAUREEN) 600 MG tablet Take 600 mg by mouth daily      Cetirizine HCl 10 MG CAPS Take 1 capsule by mouth daily as needed       DULoxetine (CYMBALTA) 60 mg delayed release capsule Take 1 capsule (60 mg total) by mouth daily 90 capsule 0    EPINEPHrine (EPIPEN) 0 3 mg/0 3 mL SOAJ Inject 0 3 mL (0 3 mg total) into a muscle once for 1 dose 0 6 mL 0    LORazepam (ATIVAN) 0 5 mg tablet Take 1 tablet (0 5 mg total) by mouth every 6 (six) hours as needed for anxiety 30 tablet 0    mometasone (ELOCON) 0 1 % lotion One drop affected ear canal daily at bedtime when necessary itching (Patient not taking: Reported on 5/11/2022) 30 mL 0    montelukast (SINGULAIR) 10 mg tablet Take 1 tablet (10 mg total) by mouth daily at bedtime 90 tablet 3    Tuberculin-Allergy Syringes (ALLERGY SYRINGE 1CC/27GX1/2") 27G X 1/2" 1 ML MISC by Subcutaneous (multi inj) route once a week 1 box of 100 ct 100 each 1       Current Allergies     Allergies as of 06/29/2022 - Reviewed 06/29/2022   Allergen Reaction Noted    Pollen extract Allergic Rhinitis 07/20/2021          The following portions of the patient's history were reviewed and updated as appropriate: allergies, current medications, past family history, past medical history, past social history, past surgical history and problem list   Past Medical History:   Diagnosis Date    Allergic rhinitis     Anxiety     Depression     Family health problem     mother and grandmother h/o blood clots after surgery    Fibroid     Fullness in ear, left     Resolved [id number]    Hypertension     Resolved [id number]    Urinary tract infection     h/o     Past Surgical History:   Procedure Laterality Date    CHOLECYSTECTOMY      HYSTERECTOMY      OH TOTAL ABDOM HYSTERECTOMY N/A 6/18/2019    Procedure: Supracervical IWONA, removal of bilat tubes, removal of IUD, cystoscopy;  Surgeon: Magda Shaw MD;  Location: [facility];  Service: Gynecology    TOOTH EXTRACTION       Family History   Problem Relation Age of Onset    Anxiety disorder Mother     Other Mother         Blood clots    Hypertension Mother     Alcohol abuse Father     Hypertension Brother     Anxiety disorder Maternal Grandmother     Other Maternal Grandmother         Blood clots    Lung cancer Maternal Grandfather     Heart disease Family         cardiac disorder    Cancer Family         lung ca    Diabetes Family     Cancer Family     Ovarian cancer Maternal Aunt 76    Alcohol abuse Maternal Aunt 72    Prostate cancer Maternal Uncle     No Known Problems Paternal Grandmother     No Known Problems Paternal Grandfather     No Known Problems Paternal Aunt        Objective   Pulse 89   Temp 98 4 °F (36 9 °C)   Resp 18   LMP 05/15/2019 (Approximate)   SpO2 97%   Patient's last menstrual period was 05/15/2019 (approximate)  Physical Exam     Physical Exam  Vitals and nursing note reviewed  Constitutional:       General: She is not in acute distress  Appearance: She is well-developed  She is not diaphoretic  Cardiovascular:      Rate and Rhythm: Normal rate  Pulmonary:      Effort: Pulmonary effort is normal    Skin:     General: Skin is warm and dry  Findings: Rash present  Comments: Scattered re papulovesicular lesions consistent with rhus  Patch left cheek on face  Scattered lesions spreading on arms  Neurological:      Mental Status: She is alert and oriented to person, place, and time     Psychiatric:         Mood and Affect: Mood normal          Behavior: Behavior normal

## 2022-06-29 NOTE — PATIENT INSTRUCTIONS
Poison Ivy   WHAT YOU NEED TO KNOW:   Poison ivy is a plant that can cause an itchy, uncomfortable rash on your skin  Poison ivy grows as a shrub or vine in woods, fields, and areas of thick [place]  It has 3 bright green leaves on each stem that turn red in renay  DISCHARGE INSTRUCTIONS:   Medicines:   Antiseptic or drying creams or ointments: These medicines may be used to dry out the rash and decrease the itching  These products may be available without a doctor's order  Steroids: This medicine helps decrease itching and inflammation  It can be given as a cream to apply to your skin or as a pill  Antihistamines: This medicine may help decrease itching and help you sleep  It is available without a doctor's order  Take your medicine as directed  Contact your healthcare provider if you think your medicine is not helping or if you have side effects  Tell him of her if you are allergic to any medicine  Keep a list of the medicines, vitamins, and herbs you take  Include the amounts, and when and why you take them  Bring the list or the pill bottles to follow-up visits  Carry your medicine list with you in case of an emergency  Follow up with your doctor as directed:  Write down your questions so you remember to ask them during your visits  How your poison ivy rash spreads: You cannot spread poison ivy by touching your rash or the liquid from your blisters  Poison ivy is spread only if you scratch your skin while it still has oil on it  You may think your rash is spreading because new rashes appear over a number of days  This happens because areas covered by thin skin break out in a rash first  Your face or forearms may develop a rash before thicker areas, such as the palms of your hands  Self-care:   Keep your rash clean and dry:  Wash it with soap and water  Gently pat it dry with a clean towel  Try not to scratch or rub your rash: This can cause your skin to become infected      Use a compress on your rash:  Dip a clean washcloth in cool water  Wring it out and place it on your rash  Leave the washcloth on your skin for 15 minutes  Do this at least 3 times per day  Take a cornstarch or oatmeal bath: If your rash is too large to cover with wet washcloths, take 3 or 4 cornstarch baths daily  Mix 1 pound of cornstarch with a little water to make a paste  Add the paste to a tub full of water and mix well  You may also use colloidal oatmeal in the bath water  Use lukewarm water  Avoid hot water because it may cause your itching to increase  Prevent a poison ivy rash in the future:   Wear skin protection:  Wear long pants, a long-sleeved shirt, and gloves  Use a skin block lotion to protect your skin from poison ivy oil  You can find this at a drugstore without a prescription  Wash clothing after possible exposure: If you think you have been near a poison ivy plant, wash the clothes you were wearing separately from other clothes  Rinse the washing machine well after you take the clothes out  Scrub boots and shoes with warm, soapy water  Dry clean items and clothing that you cannot wash in water  Poison ivy oil is sticky and can stay on surfaces for a long time  It can cause a new rash even years later  Bathe your pet:  Use warm water and shampoo on your pet's fur  This will prevent the spread of oil to your skin, car, and home  Wear long sleeves, long pants, and gloves while washing pets or any items that may have oil on them  Reduce exposure to poison ivy:  Do not touch plants that look like poison ivy  Keep your yard free of poison ivy  While protecting your skin, remove the plant and the roots  Place them in a plastic bag and seal the bag tightly  Do not burn poison ivy plants: This can spread the oil through the air  If you breathe the oil into your lungs, you could have swelling and serious breathing problems   Oil that clings to the fire kelsey can land on your skin and cause a rash  Contact your healthcare provider if:   You have pus, soft yellow scabs, or tenderness on the rash  The itching gets worse or keeps you awake at night  The rash covers more than 1/4 of your skin or spreads to your eyes, mouth, or genital area  The rash is not better after 2 to 3 weeks  You have tender, swollen glands on the sides of your neck  You have swelling in your arms and legs  You have questions or concerns about your condition or care  Return to the emergency department if:   You have a fever  You have redness, swelling, and tenderness around the rash  You have trouble breathing  © Copyright [employer] 2022 Information is for End User's use only and may not be sold, redistributed or otherwise used for commercial purposes  All illustrations and images included in CareNotes® are the copyrighted property of [employer]  or Matthew Rose  The above information is an  only  It is not intended as medical advice for individual conditions or treatments  Talk to your doctor, nurse or pharmacist before following any medical regimen to see if it is safe and effective for you

## 2022-07-05 LAB

## 2022-07-07 ENCOUNTER — TELEPHONE (OUTPATIENT)
Dept: SLEEP CENTER | Facility: CLINIC | Age: 42
End: 2022-07-07

## 2022-07-07 NOTE — TELEPHONE ENCOUNTER
Pt  was set up 7/5 by Monae Wilhelm at [facility] on Auto CPAP 5-20cm and given P30i nasal pillow mask

## 2022-08-09 ENCOUNTER — ANNUAL EXAM (OUTPATIENT)
Dept: OBGYN CLINIC | Facility: CLINIC | Age: 42
End: 2022-08-09
Payer: COMMERCIAL

## 2022-08-09 VITALS
BODY MASS INDEX: 44.41 KG/M2 | SYSTOLIC BLOOD PRESSURE: 124 MMHG | HEIGHT: 68 IN | WEIGHT: 293 LBS | DIASTOLIC BLOOD PRESSURE: 90 MMHG

## 2022-08-09 DIAGNOSIS — Z01.419 WOMEN'S ANNUAL ROUTINE GYNECOLOGICAL EXAMINATION: Primary | ICD-10-CM

## 2022-08-09 DIAGNOSIS — Z12.31 ENCOUNTER FOR SCREENING MAMMOGRAM FOR BREAST CANCER: ICD-10-CM

## 2022-08-09 PROCEDURE — S0612 ANNUAL GYNECOLOGICAL EXAMINA: HCPCS | Performed by: OBSTETRICS & GYNECOLOGY

## 2022-08-09 NOTE — PROGRESS NOTES
Assessment/Plan   Diagnoses and all orders for this visit:    Women's annual routine gynecological examination    Encounter for screening mammogram for breast cancer  -     Mammo screening bilateral w 3d & cad; Future    1  Yearly exam-Pap smear deferred, self-breast awareness reviewed, calcium/vitamin-D recommendations discussed, mammogram request given, colonoscopy to be recommended after age 39    2  Status post laparotomy with supracervical hysterectomy-760 g uterus with fibroids removed at surgery from 06/18/19  She has healed well with good outcome  She will call with any issues  3  Prior anemia-resolved since hysterectomy  4  Prior history of Mirena IUD/pelvic cramping/left adnexal tenderness/irregular bleeding-resolved since surgery  5  Previous vaginal dryness-patient has not been sexually active recently   can get an erection but it does not last for long  Apparently, this became worse since he had COVID a few months ago  Would suggest follow-up with his urologist   6  Other-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, physics, bioethics  My support was given  7  Elevated BMI  8  Diastolic blood pressure-was 90 today  Patient believe she has some element of white coat hypertension  This is happened before at doctor's visit, she does check blood pressure at home with normal results  She will continue to follow this  Should she have persistence of diastolic of 90 or systolic around 296, would suggest follow-up with primary doctor  Follow-up 1 year for yearly exam as needed  Subjective   Patient ID: Elin Peña is a 38 y o  female  Vitals:    08/09/22 1256   BP: 124/90     Patient was seen today for yearly exam   Please see assessment plan for details        The following portions of the patient's history were reviewed and updated as appropriate: allergies, current medications, past family history, past medical history, past social history, past surgical history and problem list   Past Medical History:   Diagnosis Date    Allergic rhinitis     Anxiety     Depression     Family health problem     mother and grandmother h/o blood clots after surgery    Fibroid     Fullness in ear, left     Resolved [id number]    Hypertension     Resolved [id number]    Urinary tract infection     h/o     Past Surgical History:   Procedure Laterality Date    CHOLECYSTECTOMY      HYSTERECTOMY      RI TOTAL ABDOM HYSTERECTOMY N/A 2019    Procedure: Supracervical IWONA, removal of bilat tubes, removal of IUD, cystoscopy;  Surgeon: Alicia Callahan MD;  Location: [facility];  Service: Gynecology    TOOTH EXTRACTION       OB History    Para Term  AB Living   0 0 0 0 0 0   SAB IAB Ectopic Multiple Live Births   0 0 0 0 0       Current Outpatient Medications:     Azelastine HCl 137 MCG/SPRAY SOLN, 1-2 puffs each nostril twice a day when necessary nasal congestion, Disp: 90 mL, Rfl: 3    calcium carbonate (OS-MAUREEN) 600 MG tablet, Take 600 mg by mouth daily, Disp: , Rfl:     Cetirizine HCl 10 MG CAPS, Take 1 capsule by mouth daily as needed , Disp: , Rfl:     DULoxetine (CYMBALTA) 60 mg delayed release capsule, Take 1 capsule (60 mg total) by mouth daily, Disp: 90 capsule, Rfl: 0    ibuprofen (MOTRIN) 400 mg tablet, Take 400 mg by mouth every 6 (six) hours as needed, Disp: , Rfl:     LORazepam (ATIVAN) 0 5 mg tablet, Take 1 tablet (0 5 mg total) by mouth every 6 (six) hours as needed for anxiety, Disp: 30 tablet, Rfl: 0    mometasone (ELOCON) 0 1 % lotion, ONE DROP AFFECTED EAR CANAL DAILY AT BEDTIME WHEN NECESSARY ITCHING, Disp: 30 mL, Rfl: 0    montelukast (SINGULAIR) 10 mg tablet, Take 1 tablet (10 mg total) by mouth daily at bedtime, Disp: 90 tablet, Rfl: 3    predniSONE 10 mg tablet, Take 6 tablets on day 1; 5 on day 2; 4 on day 3; 3 on day 4; 2 on day 5; 1 on day 6  Take with food  , Disp: 21 tablet, Rfl: 0    EPINEPHrine (EPIPEN) 0 3 mg/0 3 mL SOAJ, Inject 0 3 mL (0 3 mg total) into a muscle once for 1 dose, Disp: 0 6 mL, Rfl: 0    Tuberculin-Allergy Syringes (ALLERGY SYRINGE 1CC/27GX1/2") 27G X 1/2" 1 ML MISC, by Subcutaneous (multi inj) route once a week 1 box of 100 ct, Disp: 100 each, Rfl: 1  Allergies   Allergen Reactions    Pollen Extract Allergic Rhinitis     Social History     Socioeconomic History    Marital status: /Civil Union     Spouse name: None    Number of children: None    Years of education: None    Highest education level: None   Occupational History    None   Tobacco Use    Smoking status: Never Smoker    Smokeless tobacco: Never Used   Vaping Use    Vaping Use: Never used   Substance and Sexual Activity    Alcohol use: Yes     Comment: occassional    Drug use: No    Sexual activity: Yes     Partners: Male     Birth control/protection: Female Sterilization   Other Topics Concern    None   Social History Narrative    Always uses seat belt    Caffeine use    [employer] ([employer])     Social Determinants of Health     Financial Resource Strain: Not on file   Food Insecurity: Not on file   Transportation Needs: Not on file   Physical Activity: Not on file   Stress: Not on file   Social Connections: Not on file   Intimate Partner Violence: Not At Risk    Fear of Current or Ex-Partner: No    Emotionally Abused: No    Physically Abused: No    Sexually Abused: No   Housing Stability: Not on file     Family History   Problem Relation Age of Onset    Anxiety disorder Mother     Other Mother         Blood clots    Hypertension Mother     Alcohol abuse Father     Hypertension Brother     Anxiety disorder Maternal Grandmother     Other Maternal Grandmother         Blood clots    Lung cancer Maternal Grandfather     Heart disease Family         cardiac disorder    Cancer Family         lung ca    Diabetes Family     Cancer Family     Ovarian cancer Maternal Aunt 76    Alcohol abuse Maternal Aunt 72    Prostate cancer Maternal Uncle     No Known Problems Paternal Grandmother     No Known Problems Paternal Grandfather     No Known Problems Paternal Aunt        Review of Systems   Constitutional: Negative for chills, diaphoresis, fatigue and fever  Respiratory: Negative for apnea, cough, chest tightness, shortness of breath and wheezing  Cardiovascular: Negative for chest pain, palpitations and leg swelling  Gastrointestinal: Negative for abdominal distention, abdominal pain, anal bleeding, constipation, diarrhea, nausea, rectal pain and vomiting  Genitourinary: Negative for difficulty urinating, dyspareunia, dysuria, frequency, hematuria, menstrual problem, pelvic pain, urgency, vaginal bleeding, vaginal discharge and vaginal pain  Musculoskeletal: Negative for arthralgias, back pain and myalgias  Skin: Negative for color change and rash  Neurological: Negative for dizziness, syncope, light-headedness, numbness and headaches  Hematological: Negative for adenopathy  Does not bruise/bleed easily  Psychiatric/Behavioral: Negative for dysphoric mood and sleep disturbance  The patient is not nervous/anxious  Objective   Physical Exam  OBGyn Exam     Objective      /90 (BP Location: Left arm, Patient Position: Sitting)   Ht 5' 8" (1 727 m)   Wt (!) 146 kg (321 lb)   LMP 05/15/2019 (Approximate)   BMI 48 81 kg/m²     General:   alert and oriented, in no acute distress   Neck: normal to inspection and palpation   Breast: normal appearance, no masses or tenderness   Heart:    Lungs:    Abdomen: soft, non-tender, without masses or organomegaly   Vulva: normal   Vagina: Without erythema or lesions or discharge  Normal   Cervix: Without lesions or discharge or cervicitis    No Cervical motion tenderness   Uterus: surgically absent   Adnexa: no mass, fullness, tenderness   Rectum: negative    Psych:  Normal mood and affect   Skin:  Without obvious lesions   Eyes: symmetric, with normal movements and reactivity   Musculoskeletal:  Normal muscle tone and movements appreciated

## 2022-09-07 ENCOUNTER — OFFICE VISIT (OUTPATIENT)
Dept: FAMILY MEDICINE CLINIC | Facility: CLINIC | Age: 42
End: 2022-09-07
Payer: COMMERCIAL

## 2022-09-07 VITALS
DIASTOLIC BLOOD PRESSURE: 90 MMHG | WEIGHT: 293 LBS | TEMPERATURE: 98.2 F | OXYGEN SATURATION: 99 % | BODY MASS INDEX: 44.41 KG/M2 | SYSTOLIC BLOOD PRESSURE: 140 MMHG | HEART RATE: 68 BPM | RESPIRATION RATE: 20 BRPM | HEIGHT: 68 IN

## 2022-09-07 DIAGNOSIS — Z13.0 SCREENING FOR ENDOCRINE, METABOLIC AND IMMUNITY DISORDER: ICD-10-CM

## 2022-09-07 DIAGNOSIS — Z13.228 SCREENING FOR ENDOCRINE, METABOLIC AND IMMUNITY DISORDER: ICD-10-CM

## 2022-09-07 DIAGNOSIS — Z13.1 SCREENING FOR DIABETES MELLITUS: ICD-10-CM

## 2022-09-07 DIAGNOSIS — F41.1 GENERALIZED ANXIETY DISORDER: ICD-10-CM

## 2022-09-07 DIAGNOSIS — Z13.6 SCREENING FOR CARDIOVASCULAR CONDITION: ICD-10-CM

## 2022-09-07 DIAGNOSIS — Z13.29 SCREENING FOR ENDOCRINE, METABOLIC AND IMMUNITY DISORDER: ICD-10-CM

## 2022-09-07 DIAGNOSIS — E66.01 CLASS 3 SEVERE OBESITY DUE TO EXCESS CALORIES WITHOUT SERIOUS COMORBIDITY WITH BODY MASS INDEX (BMI) OF 45.0 TO 49.9 IN ADULT (HCC): ICD-10-CM

## 2022-09-07 DIAGNOSIS — Z00.00 ANNUAL PHYSICAL EXAM: Primary | ICD-10-CM

## 2022-09-07 DIAGNOSIS — I10 PRIMARY HYPERTENSION: ICD-10-CM

## 2022-09-07 DIAGNOSIS — F32.A DEPRESSION, UNSPECIFIED DEPRESSION TYPE: ICD-10-CM

## 2022-09-07 DIAGNOSIS — M54.2 NECK PAIN: ICD-10-CM

## 2022-09-07 PROCEDURE — 99396 PREV VISIT EST AGE 40-64: CPT | Performed by: FAMILY MEDICINE

## 2022-09-07 PROCEDURE — 3725F SCREEN DEPRESSION PERFORMED: CPT | Performed by: FAMILY MEDICINE

## 2022-09-07 RX ORDER — LORAZEPAM 0.5 MG/1
0.5 TABLET ORAL EVERY 6 HOURS PRN
Qty: 30 TABLET | Refills: 0 | Status: SHIPPED | OUTPATIENT
Start: 2022-09-07

## 2022-09-07 RX ORDER — DULOXETIN HYDROCHLORIDE 60 MG/1
60 CAPSULE, DELAYED RELEASE ORAL DAILY
Qty: 90 CAPSULE | Refills: 1 | Status: SHIPPED | OUTPATIENT
Start: 2022-09-07

## 2022-09-07 RX ORDER — AMLODIPINE BESYLATE 5 MG/1
5 TABLET ORAL DAILY
Qty: 30 TABLET | Refills: 5 | Status: SHIPPED | OUTPATIENT
Start: 2022-09-07 | End: 2022-09-30

## 2022-09-07 NOTE — PROGRESS NOTES
[facility]    NAME: Elin Peña  AGE: 38 y o  SEX: female  : 1980     DATE:2022     Assessment and Plan:     Problem List Items Addressed This Visit        Cardiovascular and Mediastinum    Primary hypertension     The patient's blood pressure continues to be elevated  It is consistent with new onset hypertension  We will start her on the amlodipine 5 mg daily as indicated  She will go for the lab work as ordered and we will see her back in a month to recheck her blood pressure  She will continue to monitor her blood pressure at home will call the readings consistently greater than 140/90  Relevant Medications    amLODIPine (NORVASC) 5 mg tablet       Other    Class 3 severe obesity due to excess calories without serious comorbidity with body mass index (BMI) of 45 0 to 49 9 in [facility])     I advised her to start weighing herself daily to track her weight  The patient was advised to start eating 7 non starchy vegetables and 3 fruits every day as well as 10-12 nuts per day  She was also advised to add on psyllium fiber 1 tbsp twice a day for goal of 13 g per day  She was advised to drink 16 oz of water before all meals and to avoid any artificially sweetened drinks  She was also advised to try high intensity interval training for 4 minutes per day along with resistance exercises  I also advised her to keep a food diary to track everything that she is eating in the course of the day  At meals, she should always cut his dish in  half and eat half her meal and then determine if she is still hungry prior to eating the additional half  We will see her back in the office as scheduled   The patient will continue to follow up with the Weight Management Program             Generalized anxiety disorder    Relevant Medications    LORazepam (ATIVAN) 0 5 mg tablet    DULoxetine (CYMBALTA) 60 mg delayed release capsule      Other Visit Diagnoses     Annual physical exam    -  Primary    Relevant Medications    amLODIPine (NORVASC) 5 mg tablet    Screening for cardiovascular condition        Relevant Orders    CBC and differential    Comprehensive metabolic panel    LDL cholesterol, direct    Lipid panel    TSH, 3rd generation with Free T4 reflex    UA (URINE) with reflex to Scope    Screening for diabetes mellitus        Relevant Orders    CBC and differential    Comprehensive metabolic panel    LDL cholesterol, direct    Lipid panel    TSH, 3rd generation with Free T4 reflex    UA (URINE) with reflex to Scope    Screening for endocrine, metabolic and immunity disorder        Relevant Orders    CBC and differential    Comprehensive metabolic panel    LDL cholesterol, direct    Lipid panel    TSH, 3rd generation with Free T4 reflex    UA (URINE) with reflex to Scope    Neck pain        Relevant Orders    Ambulatory Referral to Chiropractic    Depression, unspecified depression type        Relevant Medications    LORazepam (ATIVAN) 0 5 mg tablet    DULoxetine (CYMBALTA) 60 mg delayed release capsule          Immunizations and preventive care screenings were discussed with patient today  Appropriate education was printed on patient's after visit summary  Counseling:  Dental Health: discussed importance of regular tooth brushing, flossing, and dental visits  Injury prevention: discussed safety/seat belts, safety helmets, smoke detectors, carbon dioxide detectors, and smoking near bedding or upholstery  Exercise: the importance of regular exercise/physical activity was discussed  Recommend exercise 3-5 times per week for at least 30 minutes  Return in about 1 month (around 10/7/2022) for Recheck  Chief Complaint:     Chief Complaint   Patient presents with    Physical Exam     Patient reports that she's been getting higher BP reads at home recently         History of Present Illness:     Adult Annual Physical   Patient here for a comprehensive physical exam  The patient reports problems - ongoing high blood pressure readings  Mak Ledezma is a 38 y o  female who presents today for [complete physical]  she   has been feeling well and has no complaints today  The patient denies any chest pain, shortness of breath, or palpitations  There is no edema  There are no headaches or visual changes  There is no lightheadedness, dizziness, or fainting spells  There are no GI symptoms  The patient goes for dental exams every 6 months and sees her eye doctor  The patient is watching her diet and follows a regular exercise program    She is back to teaching  She is getting high readings at home  She is starting Noom to lose weight  She is walking  She is not sure if the CPAP is working- he has a follow up and will discuss  There is no increased depression- anxious about school starting  Diet and Physical Activity  Diet/Nutrition: well balanced diet  Exercise: walking  Depression Screening  PHQ-2/9 Depression Screening    Little interest or pleasure in doing things: 0 - not at all  Feeling down, depressed, or hopeless: 0 - not at all  Trouble falling or staying asleep, or sleeping too much: 3 - nearly every day  Feeling tired or having little energy: 3 - nearly every day  Poor appetite or overeatin - not at all  Feeling bad about yourself - or that you are a failure or have let yourself or your family down: 0 - not at all  Trouble concentrating on things, such as reading the newspaper or watching television: 1 - several days  Moving or speaking so slowly that other people could have noticed   Or the opposite - being so fidgety or restless that you have been moving around a lot more than usual: 2 - more than half the days  Thoughts that you would be better off dead, or of hurting yourself in some way: 0 - not at all  PHQ-9 Score: 9   PHQ-9 Interpretation: Mild depression        General Health  Sleep: sleeps well  Hearing: normal - bilateral   Vision: no vision problems and most recent eye exam <1 year ago  Dental: regular dental visits and brushes teeth twice daily  /GYN Health  Patient is: postmenopausal  The patient had a hysterecotmy  Review of Systems:     Review of Systems   Constitutional: Negative  HENT: Negative  Eyes: Negative  Respiratory: Negative  Cardiovascular: Negative  Gastrointestinal: Negative  Endocrine: Negative  Genitourinary: Negative  Musculoskeletal: Negative  Skin: Negative  Allergic/Immunologic: Negative  Neurological: Negative  Hematological: Negative  Psychiatric/Behavioral: Negative         Past Medical History:     Past Medical History:   Diagnosis Date    Allergic rhinitis     Anxiety     Depression     Family health problem     mother and grandmother h/o blood clots after surgery    Fibroid     Fullness in ear, left     Resolved [id number]    Hypertension     Resolved [id number]    Urinary tract infection     h/o      Past Surgical History:     Past Surgical History:   Procedure Laterality Date    CHOLECYSTECTOMY      HYSTERECTOMY      [place] TOTAL ABDOM HYSTERECTOMY N/A 6/18/2019    Procedure: Supracervical IWONA, removal of bilat tubes, removal of IUD, cystoscopy;  Surgeon: Eliberto Aase, MD;  Location: [facility];  Service: Gynecology    TOOTH EXTRACTION        Social History:     Social History     Socioeconomic History    Marital status: /Civil Union     Spouse name: None    Number of children: None    Years of education: None    Highest education level: None   Occupational History    None   Tobacco Use    Smoking status: Never Smoker    Smokeless tobacco: Never Used   Vaping Use    Vaping Use: Never used   Substance and Sexual Activity    Alcohol use: Yes     Comment: occassional    Drug use: No    Sexual activity: Yes     Partners: Male     Birth control/protection: Female Sterilization Other Topics Concern    None   Social History Narrative    Always uses seat belt    Caffeine use    [employer] ([employer])     Social Determinants of Health     Financial Resource Strain: Not on file   Food Insecurity: Not on file   Transportation Needs: Not on file   Physical Activity: Inactive    Days of Exercise per Week: 0 days   MAYKOR of Exercise per Session: 0 min   Stress: No Stress Concern Present    Feeling of Stress :  Only a little   Social Connections: Not on file   Intimate Partner Violence: Not At Risk    Fear of Current or Ex-Partner: No    Emotionally Abused: No    Physically Abused: No    Sexually Abused: No   Housing Stability: Not on file      Family History:     Family History   Problem Relation Age of Onset    Anxiety disorder Mother     Other Mother         Blood clots    Hypertension Mother     Alcohol abuse Father     Hypertension Brother     Anxiety disorder Maternal Grandmother     Other Maternal Grandmother         Blood clots    Lung cancer Maternal Grandfather     Heart disease Family         cardiac disorder    Cancer Family         lung ca    Diabetes Family     Cancer Family     Ovarian cancer Maternal Aunt 76    Alcohol abuse Maternal Aunt 72    Prostate cancer Maternal Uncle     No Known Problems Paternal Grandmother     No Known Problems Paternal Grandfather     No Known Problems Paternal Aunt       Current Medications:     Current Outpatient Medications   Medication Sig Dispense Refill    amLODIPine (NORVASC) 5 mg tablet Take 1 tablet (5 mg total) by mouth daily 30 tablet 5    Azelastine HCl 137 MCG/SPRAY SOLN 1-2 puffs each nostril twice a day when necessary nasal congestion 90 mL 3    calcium carbonate (OS-MAUREEN) 600 MG tablet Take 600 mg by mouth daily      Cetirizine HCl 10 MG CAPS Take 1 capsule by mouth daily as needed       DULoxetine (CYMBALTA) 60 mg delayed release capsule Take 1 capsule (60 mg total) by mouth daily 90 capsule 1    EPINEPHrine (EPIPEN) 0 3 mg/0 3 mL SOAJ Inject 0 3 mL (0 3 mg total) into a muscle once for 1 dose 0 6 mL 0    ibuprofen (MOTRIN) 400 mg tablet Take 400 mg by mouth every 6 (six) hours as needed      LORazepam (ATIVAN) 0 5 mg tablet Take 1 tablet (0 5 mg total) by mouth every 6 (six) hours as needed for anxiety 30 tablet 0    montelukast (SINGULAIR) 10 mg tablet Take 1 tablet (10 mg total) by mouth daily at bedtime 90 tablet 3    Multiple Vitamin (MULTIVITAMIN PO) Take by mouth in the morning      Tuberculin-Allergy Syringes (ALLERGY SYRINGE 1CC/27GX1/2") 27G X 1/2" 1 ML MISC by Subcutaneous (multi inj) route once a week 1 box of 100 ct 100 each 1     No current facility-administered medications for this visit  Allergies: Allergies   Allergen Reactions    Pollen Extract Allergic Rhinitis      Physical Exam:     /90   Pulse 68   Temp 98 2 °F (36 8 °C) (Tympanic)   Resp 20   Ht 5' 8" (1 727 m)   Wt (!) 143 kg (314 lb 3 2 oz)   LMP 05/15/2019 (Approximate)   SpO2 99%   Breastfeeding No   BMI 47 77 kg/m²     Physical Exam  Constitutional:       General: She is not in acute distress  Appearance: She is well-developed  She is not diaphoretic  HENT:      Head: Normocephalic and atraumatic  Right Ear: External ear normal       Left Ear: External ear normal       Nose: Nose normal       Mouth/Throat:      Pharynx: No oropharyngeal exudate  Eyes:      General: No scleral icterus  Right eye: No discharge  Left eye: No discharge  Pupils: Pupils are equal, round, and reactive to light  Neck:      Thyroid: No thyromegaly  Vascular: No JVD  Trachea: No tracheal deviation  Cardiovascular:      Rate and Rhythm: Normal rate and regular rhythm  Heart sounds: Normal heart sounds  No murmur heard  No friction rub  No gallop  Pulmonary:      Effort: Pulmonary effort is normal  No respiratory distress        Breath sounds: Normal breath sounds  No wheezing or rales  Chest:      Chest wall: No tenderness  Abdominal:      General: Bowel sounds are normal  There is no distension  Palpations: Abdomen is soft  There is no mass  Tenderness: There is no abdominal tenderness  There is no guarding or rebound  Hernia: No hernia is present  Musculoskeletal:         General: No tenderness or deformity  Normal range of motion  Cervical back: Normal range of motion and neck supple  Lymphadenopathy:      Cervical: No cervical adenopathy  Skin:     General: Skin is warm and dry  Coloration: Skin is not pale  Findings: No erythema or rash  Neurological:      Mental Status: She is alert and oriented to person, place, and time  Cranial Nerves: No cranial nerve deficit  Sensory: No sensory deficit  Motor: No abnormal muscle tone  Coordination: Coordination normal       Deep Tendon Reflexes: Reflexes normal    Psychiatric:         Behavior: Behavior normal          Thought Content:  Thought content normal           Marquis Percy DO  [address] [facility]

## 2022-09-07 NOTE — PATIENT INSTRUCTIONS

## 2022-09-08 PROBLEM — I10 PRIMARY HYPERTENSION: Status: ACTIVE | Noted: 2022-09-08

## 2022-09-08 NOTE — ASSESSMENT & PLAN NOTE
I advised her to start weighing herself daily to track her weight  The patient was advised to start eating 7 non starchy vegetables and 3 fruits every day as well as 10-12 nuts per day  She was also advised to add on psyllium fiber 1 tbsp twice a day for goal of 13 g per day  She was advised to drink 16 oz of water before all meals and to avoid any artificially sweetened drinks  She was also advised to try high intensity interval training for 4 minutes per day along with resistance exercises  I also advised her to keep a food diary to track everything that she is eating in the course of the day  At meals, she should always cut his dish in  half and eat half her meal and then determine if she is still hungry prior to eating the additional half  We will see her back in the office as scheduled   The patient will continue to follow up with the Weight Management Program

## 2022-09-08 NOTE — ASSESSMENT & PLAN NOTE
The patient's blood pressure continues to be elevated  It is consistent with new onset hypertension  We will start her on the amlodipine 5 mg daily as indicated  She will go for the lab work as ordered and we will see her back in a month to recheck her blood pressure  She will continue to monitor her blood pressure at home will call the readings consistently greater than 140/90

## 2022-09-10 ENCOUNTER — OFFICE VISIT (OUTPATIENT)
Dept: URGENT CARE | Facility: MEDICAL CENTER | Age: 42
End: 2022-09-10
Payer: COMMERCIAL

## 2022-09-10 DIAGNOSIS — M22.2X1 PATELLOFEMORAL DISORDER OF RIGHT KNEE: Primary | ICD-10-CM

## 2022-09-10 PROCEDURE — 99213 OFFICE O/P EST LOW 20 MIN: CPT | Performed by: PHYSICIAN ASSISTANT

## 2022-09-10 RX ORDER — MELOXICAM 15 MG/1
15 TABLET ORAL DAILY
Qty: 14 TABLET | Refills: 0 | Status: SHIPPED | OUTPATIENT
Start: 2022-09-10 | End: 2022-10-06 | Stop reason: SDUPTHER

## 2022-09-10 NOTE — PROGRESS NOTES
[facility]        NAME: Kevin Izquierdo is a 38 y o  female  : 1980    MRN: [id number]  DATE: September 10, 2022  TIME: 11:32 AM    Assessment and Plan   Patellofemoral disorder of right knee [M22 2X1]  1  Patellofemoral disorder of right knee  meloxicam (Mobic) 15 mg tablet         Patient Instructions       Follow up with PCP in 7-10 days as needed  Chief Complaint   No chief complaint on file  History of Present Illness       Patient had turned and felt a pain in her right patellofemoral area that radiated posteriorly  Did try 600 of Advil 3 times with minimal success  She is able to walk but limps  No previous problems  Knee Pain   The incident occurred 2 days ago  The incident occurred at home  The injury mechanism is unknown  The pain is present in the right knee  The pain is at a severity of 6/10  The pain has been intermittent since onset  Pertinent negatives include no inability to bear weight, loss of motion, loss of sensation, muscle weakness, numbness or tingling  She reports no foreign bodies present  The symptoms are aggravated by movement, palpation and weight bearing  She has tried NSAIDs and ice for the symptoms  The treatment provided mild relief  Review of Systems   Review of Systems   Neurological: Negative for tingling and numbness  All other systems reviewed and are negative          Current Medications       Current Outpatient Medications:     meloxicam (Mobic) 15 mg tablet, Take 1 tablet (15 mg total) by mouth daily for 14 days, Disp: 14 tablet, Rfl: 0    amLODIPine (NORVASC) 5 mg tablet, Take 1 tablet (5 mg total) by mouth daily, Disp: 30 tablet, Rfl: 5    Azelastine HCl 137 MCG/SPRAY SOLN, 1-2 puffs each nostril twice a day when necessary nasal congestion, Disp: 90 mL, Rfl: 3    calcium carbonate (OS-MAUREEN) 600 MG tablet, Take 600 mg by mouth daily, Disp: , Rfl:     Cetirizine HCl 10 MG CAPS, Take 1 capsule by mouth daily as needed , Disp: , Rfl:   DULoxetine (CYMBALTA) 60 mg delayed release capsule, Take 1 capsule (60 mg total) by mouth daily, Disp: 90 capsule, Rfl: 1    EPINEPHrine (EPIPEN) 0 3 mg/0 3 mL SOAJ, Inject 0 3 mL (0 3 mg total) into a muscle once for 1 dose, Disp: 0 6 mL, Rfl: 0    ibuprofen (MOTRIN) 400 mg tablet, Take 400 mg by mouth every 6 (six) hours as needed, Disp: , Rfl:     LORazepam (ATIVAN) 0 5 mg tablet, Take 1 tablet (0 5 mg total) by mouth every 6 (six) hours as needed for anxiety, Disp: 30 tablet, Rfl: 0    montelukast (SINGULAIR) 10 mg tablet, Take 1 tablet (10 mg total) by mouth daily at bedtime, Disp: 90 tablet, Rfl: 3    Multiple Vitamin (MULTIVITAMIN PO), Take by mouth in the morning, Disp: , Rfl:     Tuberculin-Allergy Syringes (ALLERGY SYRINGE 1CC/27GX1/2") 27G X 1/2" 1 ML MISC, by Subcutaneous (multi inj) route once a week 1 box of 100 ct, Disp: 100 each, Rfl: 1    Current Allergies     Allergies as of 09/10/2022 - Reviewed 09/10/2022   Allergen Reaction Noted    Pollen extract Allergic Rhinitis 07/20/2021            The following portions of the patient's history were reviewed and updated as appropriate: allergies, current medications, past family history, past medical history, past social history, past surgical history and problem list      Past Medical History:   Diagnosis Date    Allergic rhinitis     Anxiety     Depression     Family health problem     mother and grandmother h/o blood clots after surgery    Fibroid     Fullness in ear, left     Resolved [id number]    Hypertension     Resolved [id number]    Urinary tract infection     h/o       Past Surgical History:   Procedure Laterality Date    CHOLECYSTECTOMY      HYSTERECTOMY      [place] TOTAL ABDOM HYSTERECTOMY N/A 6/18/2019    Procedure: Supracervical IWONA, removal of bilat tubes, removal of IUD, cystoscopy;  Surgeon: Fransico Smith MD;  Location: [facility];  Service: Gynecology    TOOTH EXTRACTION         Family History   Problem Relation Age of Onset    Anxiety disorder Mother     Other Mother         Blood clots    Hypertension Mother     Alcohol abuse Father     Hypertension Brother     Anxiety disorder Maternal Grandmother     Other Maternal Grandmother         Blood clots    Lung cancer Maternal Grandfather     Heart disease Family         cardiac disorder    Cancer Family         lung ca    Diabetes Family     Cancer Family     Ovarian cancer Maternal Aunt 76    Alcohol abuse Maternal Aunt 72    Prostate cancer Maternal Uncle     No Known Problems Paternal Grandmother     No Known Problems Paternal Grandfather     No Known Problems Paternal Aunt          Medications have been verified  Objective   LMP 05/15/2019 (Approximate)   Patient's last menstrual period was 05/15/2019 (approximate)  Physical Exam     Physical Exam  Vitals and nursing note reviewed  Constitutional:       Appearance: Normal appearance  She is obese  Cardiovascular:      Rate and Rhythm: Normal rate and regular rhythm  Pulses: Normal pulses  Heart sounds: Normal heart sounds  Pulmonary:      Effort: Pulmonary effort is normal       Breath sounds: Normal breath sounds  Neurological:      Mental Status: She is alert  Right knee no edema no effusion  Full range of motion  Negative drawer negative Lachman's  Negative medial lateral instability at 0 and 30°    Positive patellofemoral discomfort

## 2022-09-24 LAB
ALBUMIN SERPL-MCNC: 4.5 G/DL (ref 3.8–4.8)
ALBUMIN/GLOB SERPL: 1.9 {RATIO} (ref 1.2–2.2)
ALP SERPL-CCNC: 88 IU/L (ref 44–121)
ALT SERPL-CCNC: 19 IU/L (ref 0–32)
APPEARANCE UR: CLEAR
AST SERPL-CCNC: 16 IU/L (ref 0–40)
BASOPHILS # BLD AUTO: 0 X10E3/UL (ref 0–0.2)
BASOPHILS NFR BLD AUTO: 0 %
BILIRUB SERPL-MCNC: 0.3 MG/DL (ref 0–1.2)
BILIRUB UR QL STRIP: NEGATIVE
BUN SERPL-MCNC: 12 MG/DL (ref 6–24)
BUN/CREAT SERPL: 20 (ref 9–23)
CALCIUM SERPL-MCNC: 9.4 MG/DL (ref 8.7–10.2)
CHLORIDE SERPL-SCNC: 101 MMOL/L (ref 96–106)
CHOLEST SERPL-MCNC: 221 MG/DL (ref 100–199)
CHOLEST/HDLC SERPL: 6 RATIO (ref 0–4.4)
CO2 SERPL-SCNC: 24 MMOL/L (ref 20–29)
COLOR UR: YELLOW
CREAT SERPL-MCNC: 0.61 MG/DL (ref 0.57–1)
EGFR: 113 ML/MIN/1.73
EOSINOPHIL # BLD AUTO: 0.1 X10E3/UL (ref 0–0.4)
EOSINOPHIL NFR BLD AUTO: 2 %
ERYTHROCYTE [DISTWIDTH] IN BLOOD BY AUTOMATED COUNT: 13.1 % (ref 11.7–15.4)
GLOBULIN SER-MCNC: 2.4 G/DL (ref 1.5–4.5)
GLUCOSE SERPL-MCNC: 120 MG/DL (ref 65–99)
GLUCOSE UR QL: NEGATIVE
HCT VFR BLD AUTO: 43.9 % (ref 34–46.6)
HDLC SERPL-MCNC: 37 MG/DL
HGB BLD-MCNC: 14.2 G/DL (ref 11.1–15.9)
HGB UR QL STRIP: NEGATIVE
IMM GRANULOCYTES # BLD: 0 X10E3/UL (ref 0–0.1)
IMM GRANULOCYTES NFR BLD: 0 %
KETONES UR QL STRIP: NEGATIVE
LDLC SERPL CALC-MCNC: 135 MG/DL (ref 0–99)
LDLC SERPL DIRECT ASSAY-MCNC: 133 MG/DL (ref 0–99)
LEUKOCYTE ESTERASE UR QL STRIP: NEGATIVE
LYMPHOCYTES # BLD AUTO: 2 X10E3/UL (ref 0.7–3.1)
LYMPHOCYTES NFR BLD AUTO: 31 %
MCH RBC QN AUTO: 27.8 PG (ref 26.6–33)
MCHC RBC AUTO-ENTMCNC: 32.3 G/DL (ref 31.5–35.7)
MCV RBC AUTO: 86 FL (ref 79–97)
MICRO URNS: NORMAL
MONOCYTES # BLD AUTO: 0.4 X10E3/UL (ref 0.1–0.9)
MONOCYTES NFR BLD AUTO: 6 %
NEUTROPHILS # BLD AUTO: 3.8 X10E3/UL (ref 1.4–7)
NEUTROPHILS NFR BLD AUTO: 61 %
NITRITE UR QL STRIP: NEGATIVE
PH UR STRIP: 7 [PH] (ref 5–7.5)
PLATELET # BLD AUTO: 356 X10E3/UL (ref 150–450)
POTASSIUM SERPL-SCNC: 4 MMOL/L (ref 3.5–5.2)
PROT SERPL-MCNC: 6.9 G/DL (ref 6–8.5)
PROT UR QL STRIP: NEGATIVE
RBC # BLD AUTO: 5.11 X10E6/UL (ref 3.77–5.28)
SL AMB VLDL CHOLESTEROL CALC: 49 MG/DL (ref 5–40)
SODIUM SERPL-SCNC: 143 MMOL/L (ref 134–144)
SP GR UR: 1.01 (ref 1–1.03)
TRIGL SERPL-MCNC: 272 MG/DL (ref 0–149)
TSH SERPL DL<=0.005 MIU/L-ACNC: 1.85 UIU/ML (ref 0.45–4.5)
UROBILINOGEN UR STRIP-ACNC: 0.2 MG/DL (ref 0.2–1)
WBC # BLD AUTO: 6.2 X10E3/UL (ref 3.4–10.8)

## 2022-09-27 ENCOUNTER — OFFICE VISIT (OUTPATIENT)
Dept: SLEEP CENTER | Facility: CLINIC | Age: 42
End: 2022-09-27
Payer: COMMERCIAL

## 2022-09-27 VITALS
BODY MASS INDEX: 44.41 KG/M2 | WEIGHT: 293 LBS | HEIGHT: 68 IN | SYSTOLIC BLOOD PRESSURE: 136 MMHG | HEART RATE: 101 BPM | DIASTOLIC BLOOD PRESSURE: 94 MMHG

## 2022-09-27 DIAGNOSIS — R06.83 SNORING: ICD-10-CM

## 2022-09-27 DIAGNOSIS — E66.01 CLASS 3 SEVERE OBESITY DUE TO EXCESS CALORIES WITHOUT SERIOUS COMORBIDITY WITH BODY MASS INDEX (BMI) OF 45.0 TO 49.9 IN ADULT (HCC): ICD-10-CM

## 2022-09-27 DIAGNOSIS — G47.33 OBSTRUCTIVE SLEEP APNEA TREATED WITH CONTINUOUS POSITIVE AIRWAY PRESSURE (CPAP): Primary | ICD-10-CM

## 2022-09-27 DIAGNOSIS — G47.19 EXCESSIVE DAYTIME SLEEPINESS: ICD-10-CM

## 2022-09-27 DIAGNOSIS — Z99.89 OBSTRUCTIVE SLEEP APNEA TREATED WITH CONTINUOUS POSITIVE AIRWAY PRESSURE (CPAP): Primary | ICD-10-CM

## 2022-09-27 PROCEDURE — 99214 OFFICE O/P EST MOD 30 MIN: CPT | Performed by: PHYSICIAN ASSISTANT

## 2022-09-27 NOTE — PROGRESS NOTES
Progress Note - [address] [place] [address]  38 y o  female   WBQ:6/98/3229, MRN: [id number]  9/27/2022          Follow Up Evaluation / Problem: Moderate Obstructive Sleep Apnea      Thank you for the opportunity of participating in the evaluation and care of this patient in the Sleep Clinic at [address]  HPI: Lesa Becker is a 38y o  year old female  The patient presents for follow up of obstructive sleep apnea  She initially saw Monica Lucio on 05/11/2022 with the complaints of daytime sleepiness, waking 2-3 times per night to urinate, snoring, and feeling un refreshed  She had had a sleep study done in 2016 which was normal   Monica  then ordered her a home sleep study which was done 06/10/2022  Her home sleep study showed an AHI of 15 2 consistent with moderate obstructive sleep apnea  She was set up with auto CPAP 5-20 cm of H2O using nasal pillows on 07/05/2022  Patient states that for she had difficulty in using the CPAP because of the nasal pillows  She then switched to a different mask which fits under the nose and over the mouth  She finds this mask to be much better tolerated  She states in the last week she has not used her CPAP consistently due to having a cold and cough  Overall, she finds she is able to tolerate the CPAP well and sleeps without difficulty when she wears it  However, she does not feel that her symptoms of daytime sleepiness have necessarily improved  Although, she does state that she is waking up less during the night and her  is noting that she is not snoring when she uses the CPAP        Review of Systems      Genitourinary none   Cardiology none   Gastrointestinal none   Neurology numbness/tingling of an extremity   Constitutional fatigue   Integumentary none   Psychiatry anxiety and depression   Musculoskeletal joint pain   Pulmonary none   ENT none   Endocrine none   Hematological none           Current Outpatient Medications:     amLODIPine (NORVASC) 5 mg tablet, Take 1 tablet (5 mg total) by mouth daily, Disp: 30 tablet, Rfl: 5    Azelastine HCl 137 MCG/SPRAY SOLN, 1-2 puffs each nostril twice a day when necessary nasal congestion, Disp: 90 mL, Rfl: 3    calcium carbonate (OS-MAUREEN) 600 MG tablet, Take 600 mg by mouth daily, Disp: , Rfl:     Cetirizine HCl 10 MG CAPS, Take 1 capsule by mouth daily as needed , Disp: , Rfl:     DULoxetine (CYMBALTA) 60 mg delayed release capsule, Take 1 capsule (60 mg total) by mouth daily, Disp: 90 capsule, Rfl: 1    ibuprofen (MOTRIN) 400 mg tablet, Take 400 mg by mouth every 6 (six) hours as needed, Disp: , Rfl:     LORazepam (ATIVAN) 0 5 mg tablet, Take 1 tablet (0 5 mg total) by mouth every 6 (six) hours as needed for anxiety, Disp: 30 tablet, Rfl: 0    montelukast (SINGULAIR) 10 mg tablet, Take 1 tablet (10 mg total) by mouth daily at bedtime, Disp: 90 tablet, Rfl: 3    Multiple Vitamin (MULTIVITAMIN PO), Take by mouth in the morning, Disp: , Rfl:     EPINEPHrine (EPIPEN) 0 3 mg/0 3 mL SOAJ, Inject 0 3 mL (0 3 mg total) into a muscle once for 1 dose, Disp: 0 6 mL, Rfl: 0    meloxicam (Mobic) 15 mg tablet, Take 1 tablet (15 mg total) by mouth daily for 14 days, Disp: 14 tablet, Rfl: 0    Tuberculin-Allergy Syringes (ALLERGY SYRINGE 1CC/27GX1/2") 27G X 1/2" 1 ML MISC, by Subcutaneous (multi inj) route once a week 1 box of 100 ct, Disp: 100 each, Rfl: 1    Reynolds Sleepiness Scale  Sitting and reading: Moderate chance of dozing  Watching TV: Moderate chance of dozing  Sitting, inactive in a public place (e g  a theatre or a meeting):  Would never doze  As a passenger in a car for an hour without a break: High chance of dozing  Lying down to rest in the afternoon when circumstances permit: Moderate chance of dozing  Sitting and talking to someone: Would never doze  Sitting quietly after a lunch without alcohol: Slight chance of dozing  In a car, while stopped for a few minutes in traffic: Slight chance of dozing  Total score: 11              Vitals:    09/27/22 1606   BP: 136/94   BP Location: Left arm   Patient Position: Sitting   Cuff Size: Large   Pulse: 101   Weight: (!) 141 kg (310 lb)   Height: 5' 8" (1 727 m)       Body mass index is 47 14 kg/m²  EPWORTH SLEEPINESS SCORE  Total score: 11      Past History Since Last Sleep Center Visit:     Patient states she was diagnosed with hypertension recently and started on Norvasc 5 mg daily  She is following with her PCP in regards to her blood pressure  Patient was set up with auto CPAP on 07/05/2022  She initially had a nasal pillow mask which she found difficult to use as she had difficulty in breathing with it  She then switched to a full face which fits under the nose and over the mouth and finds it is much more comfortable and easier for her to breathe with the CPAP  She states she has mostly been using the CPAP nightly since she got the new mask  However, in the past week she has had a cold and has not been able to use the CPAP consistently  She states she has been waking up coughing and taking the mask off  Overall, she states she has no difficulty in sleeping with the CPAP and would like to continue using it  The patient reports that she cleans the equipment appropriately and changes supplies on a regular basis  The review of systems and following portions of the patient's history were reviewed and updated as appropriate: allergies, current medications, past family history, past medical history, past social history, past surgical history, and problem list         OBJECTIVE  Equipment set up date:  07/05/2022  PAP Pressure: Nasal AutoPAP using a lower limit of 5 cm and an upper limit of 20 cm of water pressure  Type of mask used: She was using nasal pillow patient switched to F 30 full face, over the mouth and under the nose mask, which she prefers    DME Provider: [facility]  Denies aerophagia or AM headaches    Physical Exam:     General Appearance:   Alert, cooperative, no distress, appears stated age, obese     Head:   Normocephalic, without obvious abnormality, atraumatic     Eyes:   PERRL, conjunctiva/corneas clear, EOM's intact          Nose:  Nares normal, septum midline, no drainage or sinus tenderness           Throat:  Lips, teeth and gums normal; tongue normal size and midline mucosa moist, uvula normal, tonsils not visualized, Mallampati class 3       Neck:  Supple, symmetrical, trachea midline, no adenopathy; Thyroid: No enlargement, tenderness or nodules; no carotid bruit or JVD     Lungs:      Clear to auscultation bilaterally, respirations unlabored     Heart:   Regular rate and rhythm, S1 and S2 normal, no murmur, rub or gallop       Extremities:  Extremities normal, atraumatic, no cyanosis or edema       Skin:  Skin color, texture, turgor normal, no rashes or lesions       Neurologic:  No focal deficits noted       ASSESSMENT / PLAN    1  Obstructive sleep apnea treated with continuous positive airway pressure (CPAP)  Assessment & Plan:  Patient's compliance report shows she is using her CPAP 77 % of the time but only 53% of the time she is using it 4 hours or more  Patient relates she had an adjustment period when she began CPAP treatment and finding a mask that worked the best for her  Recently, she has been recovering from a cold with a cough and has had difficulty in using her CPAP  She states now that she is feeling better and has a mask that fits well, she feels she will be able to consistently use her CPAP more than 4 hours nightly  I will follow back with her in 3 months to reassess her compliance and efficacy  If she is having any difficulty in using her CPAP at that point, we consider doing a CPAP study  2  Excessive daytime sleepiness  Assessment & Plan:  Patient states her daytime sleepiness has not improved as of yet    Hopefully when she starts consistently being able to use her CPAP machine, she will see an improvement in her daytime sleepiness  We can reassess this at her 3 month visit  3  Class 3 severe obesity due to excess calories without serious comorbidity with body mass index (BMI) of 45 0 to 49 9 in adult [facility])  Assessment & Plan:  Patient is currently working on a weight loss program   She has lost 6 lb since her last appointment  I did advise her weight loss would be beneficial to her overall health and could potentially improve or resolve her obstructive sleep apnea  If she does obtain a significant weight loss we could consider performing another sleep study to reassess for sleep apnea  4  Snoring  Assessment & Plan:  Patient relates his snoring has resolved completely when she is using her CPAP  Counseling / Coordination of Care  Total clinic time spent today 30 minutes in chart review, face-to-face time spent with the patient, coordination of care and documentation  A description of the counseling / coordination of care:     Impressions, Diagnostic results, Prognosis, Instructions for management, Risks and benefits of treatment, Patient and family education, Risk factor reductions and Importance of compliance with treatment    Today I reviewed the patient's compliance data  she has been able to use the equipment 77% of all days recorded  Average usage was 4 or more hours 53% of all days recorded  The patient uses the equipment for an average of 5 hours and 4 minutes per night  The estimated AHI is 2 4 abnormal breathing events per hour  The patient feels they benefit from the use of PAP equipment and would like to continue PAP therapy  Response to treatment has been good  I believe she is on the right track with her CPAP usage  Now that she found a mask that fits her properly and she is getting over a cold from the last week, I think she will be able to use the CPAP more consistently    When she is using the CPAP her AHI is noted to be 2 4  Patient will work on wearing her CPAP nightly, and I will follow back with her in 3 months to reassess  She will continue using this equipment at the settings noted above for the next 3 months  At that timeshe will then return for a routine follow-up evaluation  I have asked the patient to contact the [facility] if she encounters any difficulties prior to that time  The following instructions have been given to the patient today:    Patient Instructions   Continue with CPAP at current settings and with your new mask  When you are using your CPAP, your numbers look good  It is just a matter of using it more consistently  Will follow back with you in 3 months to reassess your compliance and efficacy  At that time if your usage is not improving, we could consider doing a CPAP study  Nursing Support:  When: Monday through Friday 7A-5PM except holidays  Where: Our direct line is [phone]  If you are having a true emergency please call 911  In the event that the line is busy or it is after hours please leave a voice message and we will return your call  Please speak clearly, leaving your full name, birth date, best number to reach you and the reason for your call  Medication refills: We will need the name of the medication, the dosage, the ordering provider, whether you get a 30 or 90 day refill, and the pharmacy name and address  Medications will be ordered by the provider only  Nurses cannot call in prescriptions  Please allow 7 days for medication refills  Physician requested updates: If your provider requested that you call with an update after starting medication, please be ready to provide us the medication and dosage, what time you take your medication, the time you attempt to fall asleep, time you fall asleep, when you wake up, and what time you get out of bed  Sleep Study Results:  We will contact you with sleep study results and/or next steps after the physician has reviewed your testing             RYAN Sheets

## 2022-09-27 NOTE — ASSESSMENT & PLAN NOTE
Patient's compliance report shows she is using her CPAP 77 % of the time but only 53% of the time she is using it 4 hours or more  Patient relates she had an adjustment period when she began CPAP treatment and finding a mask that worked the best for her  Recently, she has been recovering from a cold with a cough and has had difficulty in using her CPAP  She states now that she is feeling better and has a mask that fits well, she feels she will be able to consistently use her CPAP more than 4 hours nightly  I will follow back with her in 3 months to reassess her compliance and efficacy  If she is having any difficulty in using her CPAP at that point, we consider doing a CPAP study

## 2022-09-27 NOTE — ASSESSMENT & PLAN NOTE
Patient is currently working on a weight loss program   She has lost 6 lb since her last appointment  I did advise her weight loss would be beneficial to her overall health and could potentially improve or resolve her obstructive sleep apnea  If she does obtain a significant weight loss we could consider performing another sleep study to reassess for sleep apnea

## 2022-09-27 NOTE — PATIENT INSTRUCTIONS
Continue with CPAP at current settings and with your new mask  When you are using your CPAP, your numbers look good  It is just a matter of using it more consistently  Will follow back with you in 3 months to reassess your compliance and efficacy  At that time if your usage is not improving, we could consider doing a CPAP study  Nursing Support:  When: Monday through Friday 7A-5PM except holidays  Where: Our direct line is [phone]  If you are having a true emergency please call 911  In the event that the line is busy or it is after hours please leave a voice message and we will return your call  Please speak clearly, leaving your full name, birth date, best number to reach you and the reason for your call  Medication refills: We will need the name of the medication, the dosage, the ordering provider, whether you get a 30 or 90 day refill, and the pharmacy name and address  Medications will be ordered by the provider only  Nurses cannot call in prescriptions  Please allow 7 days for medication refills  Physician requested updates: If your provider requested that you call with an update after starting medication, please be ready to provide us the medication and dosage, what time you take your medication, the time you attempt to fall asleep, time you fall asleep, when you wake up, and what time you get out of bed  Sleep Study Results: We will contact you with sleep study results and/or next steps after the physician has reviewed your testing

## 2022-09-27 NOTE — ASSESSMENT & PLAN NOTE
Patient states her daytime sleepiness has not improved as of yet  Hopefully when she starts consistently being able to use her CPAP machine, she will see an improvement in her daytime sleepiness  We can reassess this at her 3 month visit

## 2022-09-30 DIAGNOSIS — Z00.00 ANNUAL PHYSICAL EXAM: ICD-10-CM

## 2022-09-30 RX ORDER — AMLODIPINE BESYLATE 5 MG/1
5 TABLET ORAL DAILY
Qty: 90 TABLET | Refills: 2 | Status: SHIPPED | OUTPATIENT
Start: 2022-09-30

## 2022-10-06 ENCOUNTER — APPOINTMENT (OUTPATIENT)
Dept: RADIOLOGY | Facility: MEDICAL CENTER | Age: 42
End: 2022-10-06
Payer: COMMERCIAL

## 2022-10-06 ENCOUNTER — OFFICE VISIT (OUTPATIENT)
Dept: OBGYN CLINIC | Facility: MEDICAL CENTER | Age: 42
End: 2022-10-06
Payer: COMMERCIAL

## 2022-10-06 VITALS
SYSTOLIC BLOOD PRESSURE: 142 MMHG | DIASTOLIC BLOOD PRESSURE: 83 MMHG | WEIGHT: 293 LBS | HEIGHT: 68 IN | BODY MASS INDEX: 44.41 KG/M2 | HEART RATE: 89 BPM

## 2022-10-06 DIAGNOSIS — M25.561 ACUTE PAIN OF RIGHT KNEE: Primary | ICD-10-CM

## 2022-10-06 DIAGNOSIS — M25.561 ACUTE PAIN OF RIGHT KNEE: ICD-10-CM

## 2022-10-06 DIAGNOSIS — M22.2X1 PATELLOFEMORAL DISORDER OF RIGHT KNEE: ICD-10-CM

## 2022-10-06 DIAGNOSIS — M17.11 PRIMARY OSTEOARTHRITIS OF RIGHT KNEE: ICD-10-CM

## 2022-10-06 PROCEDURE — 73564 X-RAY EXAM KNEE 4 OR MORE: CPT

## 2022-10-06 PROCEDURE — 99203 OFFICE O/P NEW LOW 30 MIN: CPT | Performed by: EMERGENCY MEDICINE

## 2022-10-06 RX ORDER — MELOXICAM 15 MG/1
15 TABLET ORAL DAILY
Qty: 30 TABLET | Refills: 0 | Status: SHIPPED | OUTPATIENT
Start: 2022-10-06 | End: 2022-11-05

## 2022-10-06 NOTE — PROGRESS NOTES
Assessment/Plan:    Diagnoses and all orders for this visit:    Acute pain of right knee  -     XR knee 4+ vw right injury; Future  -     Ambulatory Referral to Physical Therapy; Future    Patellofemoral disorder of right knee  -     meloxicam (Mobic) 15 mg tablet; Take 1 tablet (15 mg total) by mouth daily  -     Ambulatory Referral to Physical Therapy; Future    Primary osteoarthritis of right knee  -     Ambulatory Referral to Physical Therapy; Future    Discussed with patient that her pain is likely due to arthritic and degenerative changes, and discuss the treatment options  At this point in time we will treat try meloxicam and start physical therapy  To consider steroid or Visco injection  We also discussed activity modification and weight loss  Patient has lost 7 lbs from using Noom    Return in about 4 weeks (around 11/3/2022)  Chief Complaint:     Chief Complaint   Patient presents with    Right Knee - Pain, Locking     Pain is in knee and radiates down the calf and behind the knee       Subjective:   Patient ID: Elsa Mathew is a 38 y o  female  Date of onset 9/9/22    NP presents for right knee pain located posterior knee and calf, and now including medial knee  Random pain shoots down calf   Completed about 2 weeks of Mobic 15mg  Treats with massage therapist      Review of Systems    The following portions of the patient's chart were reviewed and updated as appropriate:      Allergie  Allergies   Allergen Reactions    Pollen Extract Allergic Rhinitis   s   Allergen Reactions    Pollen Extract Allergic Rhinitis      Diagnosis Date    Allergic rhinitis     Anxiety     Depression     Family health problem     mother and grandmother h/o blood clots after surgery    Fibroid     Fullness in ear, left     Resolved [id number]    Hypertension     Resolved [id number]    Urinary tract infection     h/o       Past Surgical History:   Procedure Laterality Date    CHOLECYSTECTOMY      HYSTERECTOMY      VT TOTAL ABDOM HYSTERECTOMY N/A 6/18/2019    Procedure: Supracervical IWONA, removal of bilat tubes, removal of IUD, cystoscopy;  Surgeon: Sharita Garg MD;  Location: [facility];  Service: Gynecology    TOOTH EXTRACTION         Social History     Socioeconomic History    Marital status: /Civil Union     Spouse name: Not on file    Number of children: Not on file    Years of education: Not on file    Highest education level: Not on file   Occupational History    Not on file   Tobacco Use    Smoking status: Never Smoker    Smokeless tobacco: Never Used   Vaping Use    Vaping Use: Never used   Substance and Sexual Activity    Alcohol use: Yes     Comment: occassional    Drug use: No    Sexual activity: Yes     Partners: Male     Birth control/protection: Female Sterilization   Other Topics Concern    Not on file   Social History Narrative    Always uses seat belt    Caffeine use    [employer])     Social Determinants of Health     Financial Resource Strain: Not on file   Food Insecurity: Not on file   Transportation Needs: Not on file   Physical Activity: Inactive    Days of Exercise per Week: 0 days   [employer] of Exercise per Session: 0 min   Stress: No Stress Concern Present    Feeling of Stress :  Only a little   Social Connections: Not on file   Intimate Partner Violence: Not At Risk    Fear of Current or Ex-Partner: No    Emotionally Abused: No    Physically Abused: No    Sexually Abused: No   Housing Stability: Not on file       Family History   Problem Relation Age of Onset    Anxiety disorder Mother     Other Mother         Blood clots    Hypertension Mother     Alcohol abuse Father     Hypertension Brother     Anxiety disorder Maternal Grandmother     Other Maternal Grandmother         Blood clots    Lung cancer Maternal Grandfather     Heart disease Family         cardiac disorder    Cancer Family         lung ca    Diabetes Family     Cancer Family     Ovarian cancer Maternal Aunt 76    Alcohol abuse Maternal Aunt 72    Prostate cancer Maternal Uncle     No Known Problems Paternal Grandmother     No Known Problems Paternal Grandfather     No Known Problems Paternal Aunt        Medications:    Current Outpatient Medications:     amLODIPine (NORVASC) 5 mg tablet, TAKE 1 TABLET (5 MG TOTAL) BY MOUTH DAILY  , Disp: 90 tablet, Rfl: 2    Azelastine HCl 137 MCG/SPRAY SOLN, 1-2 puffs each nostril twice a day when necessary nasal congestion, Disp: 90 mL, Rfl: 3    calcium carbonate (OS-MAUREEN) 600 MG tablet, Take 600 mg by mouth daily, Disp: , Rfl:     Cetirizine HCl 10 MG CAPS, Take 1 capsule by mouth daily as needed , Disp: , Rfl:     DULoxetine (CYMBALTA) 60 mg delayed release capsule, Take 1 capsule (60 mg total) by mouth daily, Disp: 90 capsule, Rfl: 1    ibuprofen (MOTRIN) 400 mg tablet, Take 400 mg by mouth every 6 (six) hours as needed, Disp: , Rfl:     LORazepam (ATIVAN) 0 5 mg tablet, Take 1 tablet (0 5 mg total) by mouth every 6 (six) hours as needed for anxiety, Disp: 30 tablet, Rfl: 0    meloxicam (Mobic) 15 mg tablet, Take 1 tablet (15 mg total) by mouth daily, Disp: 30 tablet, Rfl: 0    montelukast (SINGULAIR) 10 mg tablet, Take 1 tablet (10 mg total) by mouth daily at bedtime, Disp: 90 tablet, Rfl: 3    Multiple Vitamin (MULTIVITAMIN PO), Take by mouth in the morning, Disp: , Rfl:     EPINEPHrine (EPIPEN) 0 3 mg/0 3 mL SOAJ, Inject 0 3 mL (0 3 mg total) into a muscle once for 1 dose, Disp: 0 6 mL, Rfl: 0    Tuberculin-Allergy Syringes (ALLERGY SYRINGE 1CC/27GX1/2") 27G X 1/2" 1 ML MISC, by Subcutaneous (multi inj) route once a week 1 box of 100 ct, Disp: 100 each, Rfl: 1    Patient Active Problem List   Diagnosis    Depression, recurrent (HCC)    Generalized anxiety disorder    S/P abdominal supracervical subtotal hysterectomy    Allergic rhinitis    Disturbance in sleep behavior    Class 3 severe obesity due to excess calories without serious comorbidity with body mass index (BMI) of 45 0 to 49 9 in adult (HCC)    Muscle strain of left thigh    Obstructive sleep apnea treated with continuous positive airway pressure (CPAP)    Primary sleep disturbance    Excessive daytime sleepiness    Nocturia    Primary hypertension    Snoring       Objective:  /83   Pulse 89   Ht 5' 8" (1 727 m)   Wt (!) 141 kg (310 lb)   LMP 05/15/2019 (Approximate)   BMI 47 14 kg/m²     Right Knee Exam     Tenderness   The patient is experiencing tenderness in the patella  Range of Motion   Extension: normal     Other   Erythema: absent    Comments:  + crepitus anteriorly            Physical Exam      Neurologic Exam    Procedures    I have personally reviewed pertinent films in PACS and my interpretation is x-rays right knee reveal diffuse moderate degenerative changes with no acute fracture or dislocation  Mariusz Cox

## 2022-10-06 NOTE — PATIENT INSTRUCTIONS
You may use Advil (ibuprofen) 600mg every 6 hours or at least twice per day OR Aleve (naproxen) 250-500mg every 12 hours as needed for pain and inflammation  You may also take Tylenol 500mg every 4-6 hours as needed OR max 1,000mg per dose up to 3 times per day for a total of 3,000mg per day  Check with your primary care physician to see if these medications are safe to take and to make sure they do not interfere with your other medications and medical issues  OR     While taking meloxicam do not take any other NSAIDs such as Advil, Motrin, ibuprofen, Celebrex, naproxen or Aleve  However you may take Tylenol    You may also take Tylenol 500mg every 4-6 hours as needed OR max 1,000mg per dose up to 3 times per day for a total of 3,000mg per day

## 2022-10-18 ENCOUNTER — OFFICE VISIT (OUTPATIENT)
Dept: FAMILY MEDICINE CLINIC | Facility: CLINIC | Age: 42
End: 2022-10-18
Payer: COMMERCIAL

## 2022-10-18 VITALS
TEMPERATURE: 98.4 F | HEART RATE: 68 BPM | SYSTOLIC BLOOD PRESSURE: 124 MMHG | WEIGHT: 293 LBS | DIASTOLIC BLOOD PRESSURE: 82 MMHG | RESPIRATION RATE: 20 BRPM | HEIGHT: 68 IN | OXYGEN SATURATION: 99 % | BODY MASS INDEX: 44.41 KG/M2

## 2022-10-18 DIAGNOSIS — I10 PRIMARY HYPERTENSION: Primary | ICD-10-CM

## 2022-10-18 DIAGNOSIS — Z23 NEED FOR VACCINATION: ICD-10-CM

## 2022-10-18 DIAGNOSIS — E78.2 MIXED HYPERLIPIDEMIA: ICD-10-CM

## 2022-10-18 DIAGNOSIS — R73.9 HYPERGLYCEMIA: ICD-10-CM

## 2022-10-18 PROCEDURE — 90471 IMMUNIZATION ADMIN: CPT

## 2022-10-18 PROCEDURE — 90682 RIV4 VACC RECOMBINANT DNA IM: CPT

## 2022-10-18 PROCEDURE — 99214 OFFICE O/P EST MOD 30 MIN: CPT | Performed by: FAMILY MEDICINE

## 2022-10-18 NOTE — PROGRESS NOTES
Assessment/Plan:  Problem List Items Addressed This Visit        Cardiovascular and Mediastinum    Primary hypertension - Primary     The patient's blood pressure is improved with the addition of the amlodipine 5 mg daily  We will continue her on the same dose  She will continue to work on her diet and exercise and losing weight  We will see her back in the office as scheduled  Relevant Orders    Comprehensive metabolic panel    Hemoglobin A1C    LDL cholesterol, direct    Lipid panel       Other    Hyperglycemia     The patient's blood sugar was elevated on her current lab work  She will go for the follow-up testing as ordered and she will work on improving her diet and exercise  Relevant Orders    Comprehensive metabolic panel    Hemoglobin A1C    Mixed hyperlipidemia     The patient's lipids are elevated on her current lab work  She is going to work on improving her diet and exercise  Will repeat her levels again as ordered  Relevant Orders    Comprehensive metabolic panel    Hemoglobin A1C    LDL cholesterol, direct    Lipid panel      Other Visit Diagnoses     Need for vaccination        Relevant Orders    influenza vaccine, quadrivalent, recombinant, PF, 0 5 mL, for patients 18 yr+ (FLUBLOK) (Completed)          Return in about 3 months (around 1/18/2023) for Recheck  I spent [] minutes during the visit reviewing the history from the patient, performing the examination, discussing the findings with the patient, providing counseling and education, and making a plan  I spent [] minutes ordering referrals and testing and documenting  Subjective:   Chief Complaint   Patient presents with   • Follow-up     1 month follow-up        Patient ID: Ryan Herbert is a 38 y o  female presents today for follow-up of her hypertension  Ryan Herbert is a 38 y o  female who presents for a follow up of her HTN  At her last visit, we started her on Amlodipine 5 mg daily    She is here today for follow up  She is doing better with this  The patient denies any chest pain, shortness of breath, or palpitations  There is no edema  There are no headaches or visual changes  There is no lightheadedness, dizziness, or fainting spells  The patient currently denies any nausea, vomiting, or GERD symptoms  she has normal bowel movements and normal urine output  she has a normal appetite  There are no problems with the medications  She is working on her diet  Hypertension  This is a chronic problem  The current episode started more than 1 month ago  The problem has been gradually improving since onset  Pertinent negatives include no anxiety, blurred vision, chest pain, headaches, malaise/fatigue, neck pain, orthopnea, palpitations, peripheral edema, PND, shortness of breath or sweats       The following portions of the patient's history were reviewed and updated as appropriate: allergies, current medications, past family history, past medical history, past social history, past surgical history and problem list   Patient Active Problem List   Diagnosis   • Depression, recurrent ([facility] 75 )   • Generalized anxiety disorder   • S/P abdominal supracervical subtotal hysterectomy   • Allergic rhinitis   • Disturbance in sleep behavior   • Class 3 severe obesity due to excess calories without serious comorbidity with body mass index (BMI) of 45 0 to 49 9 in adult ([facility] )   • Muscle strain of left thigh   • Obstructive sleep apnea treated with continuous positive airway pressure (CPAP)   • Primary sleep disturbance   • Excessive daytime sleepiness   • Nocturia   • Primary hypertension   • Snoring   • Hyperglycemia   • Mixed hyperlipidemia     Past Medical History:   Diagnosis Date   • Allergic rhinitis    • Anxiety    • Depression    • Family health problem     mother and grandmother h/o blood clots after surgery   • Fibroid    • Fullness in ear, left     Resolved [id number]   • Hypertension     Resolved [id number] • Urinary tract infection     h/o     Past Surgical History:   Procedure Laterality Date   • CHOLECYSTECTOMY     • HYSTERECTOMY     • MT TOTAL ABDOM HYSTERECTOMY N/A 6/18/2019    Procedure: Supracervical IWONA, removal of bilat tubes, removal of IUD, cystoscopy;  Surgeon: Nathan Butler MD;  Location: [facility];  Service: Gynecology   • TOOTH EXTRACTION       Allergies   Allergen Reactions   • Pollen Extract Allergic Rhinitis     Family History   Problem Relation Age of Onset   • Anxiety disorder Mother    • Other Mother         Blood clots   • Hypertension Mother    • Alcohol abuse Father    • Hypertension Brother    • Anxiety disorder Maternal Grandmother    • Other Maternal Grandmother         Blood clots   • Lung cancer Maternal Grandfather    • Heart disease Family         cardiac disorder   • Cancer Family         lung ca   • Diabetes Family    • Cancer Family    • Ovarian cancer Maternal Aunt 68   • Alcohol abuse Maternal Aunt 65   • Prostate cancer Maternal Uncle    • No Known Problems Paternal Grandmother    • No Known Problems Paternal Grandfather    • No Known Problems Paternal Aunt      Social History     Socioeconomic History   • Marital status: /Civil Union     Spouse name: Not on file   • Number of children: Not on file   • Years of education: Not on file   • Highest education level: Not on file   Occupational History   • Not on file   Tobacco Use   • Smoking status: Never Smoker   • Smokeless tobacco: Never Used   Vaping Use   • Vaping Use: Never used   Substance and Sexual Activity   • Alcohol use: Yes     Comment: rare   • Drug use: No   • Sexual activity: Yes     Partners: Male     Birth control/protection: Female Sterilization   Other Topics Concern   • Not on file   Social History Narrative    Always uses seat belt    Caffeine use    [employer] ([employer])     Social Determinants of Health     Financial Resource Strain: Not on file   Food Insecurity: Not on file Transportation Needs: Not on file   Physical Activity: Inactive   • Days of Exercise per Week: 0 days   • Minutes of Exercise per Session: 0 min   Stress: No Stress Concern Present   • Feeling of Stress : Only a little   Social Connections: Not on file   Intimate Partner Violence: Not At Risk   • Fear of Current or Ex-Partner: No   • Emotionally Abused: No   • Physically Abused: No   • Sexually Abused: No   Housing Stability: Not on file     Current Outpatient Medications on File Prior to Visit   Medication Sig Dispense Refill   • amLODIPine (NORVASC) 5 mg tablet TAKE 1 TABLET (5 MG TOTAL) BY MOUTH DAILY  90 tablet 2   • Azelastine HCl 137 MCG/SPRAY SOLN 1-2 puffs each nostril twice a day when necessary nasal congestion 90 mL 3   • calcium carbonate (OS-MAUREEN) 600 MG tablet Take 600 mg by mouth daily     • Cetirizine HCl 10 MG CAPS Take 1 capsule by mouth daily as needed      • DULoxetine (CYMBALTA) 60 mg delayed release capsule Take 1 capsule (60 mg total) by mouth daily 90 capsule 1   • EPINEPHrine (EPIPEN) 0 3 mg/0 3 mL SOAJ Inject 0 3 mL (0 3 mg total) into a muscle once for 1 dose 0 6 mL 0   • ibuprofen (MOTRIN) 400 mg tablet Take 400 mg by mouth every 6 (six) hours as needed     • LORazepam (ATIVAN) 0 5 mg tablet Take 1 tablet (0 5 mg total) by mouth every 6 (six) hours as needed for anxiety 30 tablet 0   • meloxicam (Mobic) 15 mg tablet Take 1 tablet (15 mg total) by mouth daily 30 tablet 0   • montelukast (SINGULAIR) 10 mg tablet Take 1 tablet (10 mg total) by mouth daily at bedtime 90 tablet 3   • Multiple Vitamin (MULTIVITAMIN PO) Take by mouth in the morning     • Tuberculin-Allergy Syringes (ALLERGY SYRINGE 1CC/27GX1/2") 27G X 1/2" 1 ML MISC by Subcutaneous (multi inj) route once a week 1 box of 100 ct 100 each 1     No current facility-administered medications on file prior to visit  Review of Systems   Constitutional: Negative  Negative for malaise/fatigue  HENT: Negative  Eyes: Negative  Negative for blurred vision  Respiratory: Negative  Negative for shortness of breath  Cardiovascular: Negative  Negative for chest pain, palpitations, orthopnea and PND  Gastrointestinal: Negative  Endocrine: Negative  Genitourinary: Negative  Musculoskeletal: Negative  Negative for neck pain  Skin: Negative  Allergic/Immunologic: Negative  Neurological: Negative  Negative for headaches  Hematological: Negative  Psychiatric/Behavioral: Negative  Objective:  Vitals:    10/18/22 1707 10/18/22 1722   BP: 136/86 124/82   BP Location: Left arm    Patient Position: Sitting    Cuff Size: Large    Pulse: 83 68   Resp: 20    Temp: 98 4 °F (36 9 °C)    TempSrc: Tympanic    SpO2: 99%    Weight: (!) 141 kg (311 lb 6 4 oz)    Height: 5' 8" (1 727 m)      Body mass index is 47 35 kg/m²  Physical Exam  Constitutional:       Appearance: She is well-developed  HENT:      Head: Normocephalic and atraumatic  Mouth/Throat:      Pharynx: No oropharyngeal exudate  Eyes:      Conjunctiva/sclera: Conjunctivae normal       Pupils: Pupils are equal, round, and reactive to light  Neck:      Thyroid: No thyromegaly  Vascular: No JVD  Trachea: No tracheal deviation  Cardiovascular:      Rate and Rhythm: Normal rate and regular rhythm  Heart sounds: Normal heart sounds  No murmur heard  No friction rub  No gallop  Pulmonary:      Effort: Pulmonary effort is normal  No respiratory distress  Breath sounds: Normal breath sounds  No stridor  No wheezing or rales  Chest:      Chest wall: No tenderness  Abdominal:      General: Bowel sounds are normal  There is no distension  Palpations: Abdomen is soft  There is no mass  Tenderness: There is no abdominal tenderness  There is no guarding or rebound  Musculoskeletal:         General: No tenderness or deformity  Normal range of motion  Cervical back: Normal range of motion     Lymphadenopathy: Cervical: No cervical adenopathy  Skin:     General: Skin is warm and dry  Neurological:      Mental Status: She is alert and oriented to person, place, and time  Cranial Nerves: No cranial nerve deficit  Motor: No abnormal muscle tone  Coordination: Coordination normal       Deep Tendon Reflexes: Reflexes are normal and symmetric  Reflexes normal          Office Visit on 09/07/2022   Component Date Value   • White Blood Cell Count 09/23/2022 6 2    • Red Blood Cell Count 09/23/2022 5 11    • Hemoglobin 09/23/2022 14 2    • HCT 09/23/2022 43 9    • MCV 09/23/2022 86    • MCH 09/23/2022 27 8    • MCHC 09/23/2022 32 3    • RDW 09/23/2022 13 1    • Platelet Count 15/88/0210 356    • Neutrophils 09/23/2022 61    • Lymphocytes 09/23/2022 31    • Monocytes 09/23/2022 6    • Eosinophils 09/23/2022 2    • Basophils PCT 09/23/2022 0    • Neutrophils (Absolute) 09/23/2022 3 8    • Lymphocytes (Absolute) 09/23/2022 2 0    • Monocytes (Absolute) 09/23/2022 0 4    • Eosinophils (Absolute) 09/23/2022 0 1    • Basophils ABS 09/23/2022 0 0    • Immature Granulocytes 09/23/2022 0    • Immature Granulocytes (A* 09/23/2022 0 0    • Glucose, Random 09/23/2022 120 (A)   • BUN 09/23/2022 12    • Creatinine 09/23/2022 0 61    • eGFR 09/23/2022 113    • SL AMB BUN/CREATININE RA* 09/23/2022 20    • Sodium 09/23/2022 143    • Potassium 09/23/2022 4 0    • Chloride 09/23/2022 101    • CO2 09/23/2022 24    • CALCIUM 09/23/2022 9 4    • Protein, Total 09/23/2022 6 9    • Albumin 09/23/2022 4 5    • Globulin, Total 09/23/2022 2 4    • Albumin/Globulin Ratio 09/23/2022 1 9    • TOTAL BILIRUBIN 09/23/2022 0 3    • Alk Phos Isoenzymes 09/23/2022 88    • AST 09/23/2022 16    • ALT 09/23/2022 19    • LDL Direct 09/23/2022 133 (A)   • Cholesterol, Total 09/23/2022 221 (A)   • Triglycerides 09/23/2022 272 (A)   • HDL 09/23/2022 37 (A)   • VLDL Cholesterol Calcula* 09/23/2022 49 (A)   • LDL Calculated 09/23/2022 135 (A)   • T  Chol/HDL Ratio 09/23/2022 6 0 (A)   • TSH 09/23/2022 1 850    • Specific Gravity 09/23/2022 1 015    • Ph 09/23/2022 7 0    • Color UA 09/23/2022 Yellow    • Urine Appearance 09/23/2022 Clear    • Leukocyte Esterase 09/23/2022 Negative    • Protein 09/23/2022 Negative    • Glucose, 24 HR Urine 09/23/2022 Negative    • Ketone, Urine 09/23/2022 Negative    • Blood, Urine 09/23/2022 Negative    • Bilirubin, Urine 09/23/2022 Negative    • Urobilinogen Urine 09/23/2022 0 2    • SL AMB NITRITES URINE, Q* 09/23/2022 Negative    • Microscopic Examination 09/23/2022 Comment

## 2022-10-23 PROBLEM — R73.9 HYPERGLYCEMIA: Status: ACTIVE | Noted: 2022-10-23

## 2022-10-23 PROBLEM — E78.2 MIXED HYPERLIPIDEMIA: Status: ACTIVE | Noted: 2022-10-23

## 2022-10-23 NOTE — ASSESSMENT & PLAN NOTE
The patient's lipids are elevated on her current lab work  She is going to work on improving her diet and exercise  Will repeat her levels again as ordered

## 2022-10-23 NOTE — ASSESSMENT & PLAN NOTE
The patient's blood sugar was elevated on her current lab work  She will go for the follow-up testing as ordered and she will work on improving her diet and exercise

## 2022-10-23 NOTE — ASSESSMENT & PLAN NOTE
The patient's blood pressure is improved with the addition of the amlodipine 5 mg daily  We will continue her on the same dose  She will continue to work on her diet and exercise and losing weight  We will see her back in the office as scheduled

## 2022-10-26 ENCOUNTER — EVALUATION (OUTPATIENT)
Dept: PHYSICAL THERAPY | Facility: CLINIC | Age: 42
End: 2022-10-26
Payer: COMMERCIAL

## 2022-10-26 DIAGNOSIS — M25.561 ACUTE PAIN OF RIGHT KNEE: ICD-10-CM

## 2022-10-26 DIAGNOSIS — G89.29 CHRONIC PAIN OF RIGHT KNEE: Primary | ICD-10-CM

## 2022-10-26 DIAGNOSIS — M25.561 CHRONIC PAIN OF RIGHT KNEE: Primary | ICD-10-CM

## 2022-10-26 PROCEDURE — 97161 PT EVAL LOW COMPLEX 20 MIN: CPT | Performed by: PHYSICAL THERAPIST

## 2022-10-26 PROCEDURE — 97110 THERAPEUTIC EXERCISES: CPT | Performed by: PHYSICAL THERAPIST

## 2022-10-26 NOTE — PROGRESS NOTES
PT Evaluation     Today's date: 10/26/2022  Patient name: Mitra Flores  : 1980  MRN: [id number]  Referring provider: Annie Longo MD  Dx:   Encounter Diagnosis     ICD-10-CM    1  Chronic pain of right knee  M25 561     G89 29                   Assessment  Assessment details: Eva Feliz is a 38year old female presenting to physical therapy with right knee pain  She also has some symptoms suggesting patellofemoral pain syndrome as a potential cause of her pain  Patient presents with pain, decreased strength, decreased range of motion and decreased tolerance to activity  Due to these impairments patient has difficulty performing activities of daily living, recreational activities and engaging in social activities  Patient would benefit from skilled physical therapy services to address these impairments and to maximize function  Thank you for the referral    Impairments: abnormal gait, abnormal muscle firing, abnormal or restricted ROM, activity intolerance, impaired balance, impaired physical strength, lacks appropriate home exercise program, pain with function and weight-bearing intolerance  Understanding of Dx/Px/POC: excellent  Goals  Impairment Goals:  1 ) Pt will have decreased sx at worst by 50% in 2-4 weeks  2 ) Pt will have improved knee range of motion to WNL without pain in 3-4 weeks  3 ) Pt will have improved knee strength by 1/2 MMT in 4-6 weeks  4 ) Pt will have decreased tenderness to palpation to gastrocnemius and lateral joint line in 3-4 weeks  Functional Goals:  1 ) Pt will be independent in their home exercise program in 1 week  2 ) Pt will be able to traverse stairs without pain in 3-4 weeks  3 ) Pt will be able to walk for 10 minutes without pain in 3-4 weeks  4 ) Pt will have an improved FOTO score of 75/100 in 6-8 weeks  5 ) Pt will be able to complete a full day at work without pain in 4-6 weeks      Plan  Patient would benefit from: skilled PT  Planned therapy interventions: joint mobilization, manual therapy, patient education, therapeutic exercise, gait training, flexibility, home exercise program, behavior modification and neuromuscular re-education  Frequency: 2x week  Duration in weeks: 8        Subjective Evaluation    History of Present Illness  Mechanism of injury: Meghan Galicia is a 38year old female presenting to physical therapy with right knee pain  She was diagnosed with arthritis of the knee  She was also told that visco injections to the knee in the future could be a potential avenue for relief  Her pain is local to the right knee and sometimes in the backside of the knee and calf area  She can get random pain shooting down the calf at times  Probably happened about 6 weeks ago after stepping awkwardly in the hallway at school  She does report a moment where her knee feels like it locked up but this has not happened in a few weeks  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  Her soreness is overall around the knee and in the lateral aspect of the patella  She denies clicking and popping  Her pain is more noticeable in the morning and gets worse as the day goes on  After a long day at work the pain is worse, it is activity dependent  Eases: Tylenol, Meloxicam  Aggs: stairs, walking, squatting, first thing in the morning, weight bearing, as the day goes on    Pain  Current pain rating: 3  At best pain ratin  At worst pain ratin  Quality: dull ache, sharp and tight  Aggravating factors: stair climbing, walking, standing, running and lifting  Progression: improved    Treatments  Previous treatment: massage  Patient Goals  Patient goals for therapy: decreased pain, increased strength, increased motion, return to sport/leisure activities and return to work          Objective     Observations     Right Knee   Negative for atrophy, deformity, edema and effusion  Palpation     Right   Tenderness of the lateral gastrocnemius, medial gastrocnemius and rectus femoris  Tenderness     Right Knee   Tenderness in the lateral joint line and lateral patella  No tenderness in the LCL (distal), LCL (proximal), MCL (distal), MCL (proximal), medial joint line and medial patella  Active Range of Motion     Right Knee   Hyperextension   Flexion: 105 degrees with pain  Extension: 10 degrees     Passive Range of Motion     Right Knee   Flexion: 110 degrees     Mobility   Patellar Mobility:   Left Knee   WFL: medial, lateral, superior and inferior  Right Knee   WFL: medial, lateral, superior and inferior    Strength/Myotome Testing     Left Hip   Planes of Motion   Flexion: 4  Extension: 4-  Abduction: 4-  Adduction: 4+    Right Hip   Planes of Motion   Flexion: 4  Extension: 4-  Abduction: 4-  Adduction: 4+    Left Knee   Flexion: 4+  Extension: 4+    Right Knee   Flexion: 4+  Extension: 4+    Tests     Right Knee   Positive medial Zofia  Negative anterior Lachman, lateral Zofia, patellar compression, posterior drawer, Thessaly's test at 5 degrees, valgus stress test at 0 degrees, valgus stress test at 30 degrees, varus stress test at 0 degrees and varus stress test at 30 degrees  Functional Assessment      Squat    Pain and right valgus                Precautions: HTN, depression      Manuals 10/26                                                                Neuro Re-Ed                                                                                                        Ther Ex             Standing calf s' 10x10''            Seated hamstring s' 10x10''            Prone knee flexion s' 10x10''            SLR 2x10            Clamshells 2x10                                                   Ther Activity                                       Gait Training                                       Modalities

## 2022-11-02 ENCOUNTER — OFFICE VISIT (OUTPATIENT)
Dept: PHYSICAL THERAPY | Facility: CLINIC | Age: 42
End: 2022-11-02

## 2022-11-02 DIAGNOSIS — M25.561 CHRONIC PAIN OF RIGHT KNEE: Primary | ICD-10-CM

## 2022-11-02 DIAGNOSIS — G89.29 CHRONIC PAIN OF RIGHT KNEE: Primary | ICD-10-CM

## 2022-11-02 DIAGNOSIS — M25.561 ACUTE PAIN OF RIGHT KNEE: ICD-10-CM

## 2022-11-02 NOTE — PROGRESS NOTES
Daily Note     Today's date: 2022  Patient name: Carlos Elizabeth  : 1980  MRN: [id number]  Referring provider: Luz Elena Freitas MD  Dx:   Encounter Diagnosis     ICD-10-CM    1  Chronic pain of right knee  M25 561     G89 29    2  Acute pain of right knee  M25 561                   Subjective: Brenna Rod explains that her pain is high this afternoon, she has been consistent with her HEP to this point  Objective: See treatment diary below      Assessment: Tolerated treatment well  Patient demonstrated fatigue post treatment and exhibited good technique with therapeutic exercises  Good release of calf and hamstring mobility post manuals  Added in resistance during clamshells and bridges in supine  Updated HEP to reflect changes made this session  Plan: Continue per plan of care        Precautions: HTN, depression      Manuals 10/26 11/2           R Knee STM calf/quad  RN                                                  Neuro Re-Ed                                                                                                        Ther Ex             Standing calf s' 10x10'' 10x10''           Seated hamstring s' 10x10'' 10x10''           Prone knee flexion s' 10x10'' 10x10''           SLR 2x10 2x10           Clamshells 2x10 2x10 GTB           Bridges  2x10           LAQ  2x10, 3#           Standing HS curls  2x10, 3#           Ther Activity                                       Gait Training                                       Modalities

## 2022-11-03 DIAGNOSIS — M22.2X1 PATELLOFEMORAL DISORDER OF RIGHT KNEE: ICD-10-CM

## 2022-11-04 RX ORDER — MELOXICAM 15 MG/1
15 TABLET ORAL DAILY
Qty: 30 TABLET | Refills: 0 | Status: SHIPPED | OUTPATIENT
Start: 2022-11-04 | End: 2022-12-04

## 2022-11-09 ENCOUNTER — OFFICE VISIT (OUTPATIENT)
Dept: PHYSICAL THERAPY | Facility: CLINIC | Age: 42
End: 2022-11-09

## 2022-11-09 DIAGNOSIS — M25.561 CHRONIC PAIN OF RIGHT KNEE: Primary | ICD-10-CM

## 2022-11-09 DIAGNOSIS — M25.561 ACUTE PAIN OF RIGHT KNEE: ICD-10-CM

## 2022-11-09 DIAGNOSIS — G89.29 CHRONIC PAIN OF RIGHT KNEE: Primary | ICD-10-CM

## 2022-11-09 NOTE — PROGRESS NOTES
Daily Note     Today's date: 2022  Patient name: Kerrie Ahn  : 1980  MRN: [id number]  Referring provider: Lv Heck MD  Dx:   Encounter Diagnosis     ICD-10-CM    1  Chronic pain of right knee  M25 561     G89 29    2  Acute pain of right knee  M25 561                   Subjective: Brayan Hardin explains that her pain is doing much better, she does note that her neck has been bothering her which is new  Objective: See treatment diary below      Assessment: Tolerated treatment well  Patient demonstrated fatigue post treatment and exhibited good technique with therapeutic exercises  Brayan Hardin presented with much less discomfort and restrictions noted in quadriceps and calf this afternoon  Plan on transitioning to neck nv given knee improvement  Plan: Continue per plan of care        Precautions: HTN, depression      Manuals 10/26 11/2 11/9          R Knee STM calf/quad  RN RN                                                 Neuro Re-Ed                                                                                                        Ther Ex             Standing calf s' 10x10'' 10x10'' 10x10''          Seated hamstring s' 10x10'' 10x10'' 10x10''          Prone knee flexion s' 10x10'' 10x10'' 10x10''          SLR 2x10 2x10 2x10          Clamshells 2x10 2x10 GTB 2x10 GTB          Bridges  2x10 2x10          LAQ  2x10, 3# 2x10, 3#          Standing HS curls  2x10, 3# 2x10, 3#          Bike                                                    Ther Activity                                       Gait Training                                       Modalities

## 2022-11-14 ENCOUNTER — OFFICE VISIT (OUTPATIENT)
Dept: PHYSICAL THERAPY | Facility: CLINIC | Age: 42
End: 2022-11-14

## 2022-11-14 DIAGNOSIS — M25.561 ACUTE PAIN OF RIGHT KNEE: ICD-10-CM

## 2022-11-14 DIAGNOSIS — M25.561 CHRONIC PAIN OF RIGHT KNEE: ICD-10-CM

## 2022-11-14 DIAGNOSIS — M54.2 NECK PAIN: Primary | ICD-10-CM

## 2022-11-14 DIAGNOSIS — G89.29 CHRONIC PAIN OF RIGHT KNEE: ICD-10-CM

## 2022-11-14 NOTE — PROGRESS NOTES
PT Evaluation     Today's date: 2022  Patient name: Jane Del Toro  : 1980  MRN: [id number]  Referring provider: Marilee Alexandre DO  Dx:   Encounter Diagnosis     ICD-10-CM    1  Neck pain  M54 2    2  Chronic pain of right knee  M25 561     G89 29    3  Acute pain of right knee  M25 561        Start Time: 1545  Stop Time: 1630  Total time in clinic (min): 45 minutes    Assessment  Assessment details: Kishore Kaminski is a 38year old female presenting to physical therapy with neck pain  Upon examination she is likely dealing with issues with a lack of deep cervical flexor strength and endurance and tightness of her suboccipitals and surrounding cervical spine musculature  Patient presents with pain, decreased strength, decreased range of motion, decreased postural awareness and decreased tolerance to activity  Due to these impairments patient has difficulty performing activities of daily living, recreational activities and engaging in social activities  Patient would benefit from skilled physical therapy services to address these impairments and to maximize function  Thank you for the referral    Impairments: abnormal muscle firing, abnormal or restricted ROM, activity intolerance, impaired physical strength, lacks appropriate home exercise program and pain with function  Understanding of Dx/Px/POC: excellent  Goals  Impairment Goals:  1 ) Pt will have decreased sx at worst by 50% in 2-4 weeks  2 ) Pt will have improved cervical active range of motion to WNL without pain in 4-6 weeks  3 ) Pt will have decreased tenderness to palpation to upper trapezius and levator scapulae by 50% in 3-4 weeks  4 ) Pt will improved scapular strength by 1/2 MMT in 4-6 weeks  Functional Goals:  1 ) Pt will be independent in their home exercise program in 1 week  2 ) Pt will be able to turn head in car to see blind spot in 4-6 weeks  3 ) Pt will be able to complete all ADL's without neck pain in 6-8 weeks    4 ) Pt will have an improved FOTO score of 75/100 in 6-8 weeks  Plan  Patient would benefit from: skilled PT  Planned therapy interventions: joint mobilization, manual therapy, patient education, therapeutic exercise, gait training, flexibility, home exercise program, behavior modification, neuromuscular re-education, graded exercise and graded activity  Frequency: 2x week  Duration in weeks: 8  Plan of Care beginning date: 2022  Plan of Care expiration date: 2023  Treatment plan discussed with: patient        Subjective Evaluation    History of Present Illness  Mechanism of injury: Nader Lopez is a 38year old female presenting to physical therapy with improved right knee pain  She would like to explore options for treatment of her neck  She notes that this has been going on for about a year  She goes to massage for her neck about once a month, she gets relief from this  This is mostly left sided in the neck and shoulder region and she gets tension headaches frequently, almost every day  Denies vision changes, dizziness, radiating pain from the neck  Her pain can also be located in the mid and lower back as well  Her neck is stiff in the morning, rotating towards the right makes it worse  She notes that stretches and heat make her muscles feel relaxed  Aggs: looking into blind spot in car, ice, stress  Eases: stretches, heat, lidocaine patch    Pain  Current pain ratin  At best pain ratin  At worst pain ratin  Quality: dull ache, sharp and tight  Aggravating factors: stair climbing, walking, standing, running and lifting  Progression: improved    Treatments  Previous treatment: massage  Patient Goals  Patient goals for therapy: decreased pain, increased strength, increased motion, return to sport/leisure activities and return to work          Objective     Static Posture     Head  Forward  Shoulders  Rounded  Observations     Right Knee   Negative for atrophy, deformity, edema and effusion  Palpation   Left   Muscle spasm in the upper trapezius  Tenderness of the levator scapulae, middle trapezius, pectoralis major, pectoralis minor, rhomboids, scalenes, sternocleidomastoid, suboccipitals and upper trapezius  Right   Muscle spasm in the upper trapezius  Tenderness of the cervical paraspinals, lateral gastrocnemius, levator scapulae, medial gastrocnemius, pectoralis major, pectoralis minor, rectus femoris, rhomboids, scalenes, sternocleidomastoid, suboccipitals and upper trapezius  Tenderness     Right Knee   Tenderness in the lateral joint line and lateral patella  No tenderness in the LCL (distal), LCL (proximal), MCL (distal), MCL (proximal), medial joint line and medial patella  Neurological Testing     Sensation   Cervical/Thoracic   Left   Intact: light touch    Right   Intact: light touch    Active Range of Motion   Cervical/Thoracic Spine       Cervical    Flexion:  Restriction level: minimal  Extension:  Restriction level: minimal  Left lateral flexion:  Restriction level: moderate  Right lateral flexion:  Restriction level minimal  Left rotation:  Restriction level: moderate  Right rotation:  with pain Restriction level: moderate    Right Knee   Hyperextension   Flexion: 105 degrees with pain  Extension: 10 degrees     Passive Range of Motion     Right Knee   Flexion: 110 degrees     Mobility   Patellar Mobility:   Left Knee   WFL: medial, lateral, superior and inferior       Right Knee   WFL: medial, lateral, superior and inferior    Strength/Myotome Testing     Left Shoulder     Isolated Muscles   Lower trapezius: 4-   Middle trapezius: 4-     Right Shoulder     Isolated Muscles   Lower trapezius: 4-   Middle trapezius: 4-     Left Hip   Planes of Motion   Flexion: 4  Extension: 4-  Abduction: 4-  Adduction: 4+    Right Hip   Planes of Motion   Flexion: 4  Extension: 4-  Abduction: 4-  Adduction: 4+    Left Knee   Flexion: 4+  Extension: 4+    Right Knee   Flexion: 4+  Extension: 4+    Tests   Cervical   Positive cervical distraction test and neck flexor muscle endurance test   Negative VBI  Thoracic   Negative slump test      Left Shoulder   Negative ULTT1  Right Shoulder   Negative ULTT1  Right Knee   Positive medial Zofia  Negative anterior Lachman, lateral Zofia, patellar compression, posterior drawer, Thessaly's test at 5 degrees, valgus stress test at 0 degrees, valgus stress test at 30 degrees, varus stress test at 0 degrees and varus stress test at 30 degrees  Functional Assessment      Squat    Pain and right valgus     TMJ   Jaw observations: within normal limits             Precautions: HTN, depression      Manuals 10/26            SOR RN            STM B UT/Obey RN                                      Neuro Re-Ed                                                                                                        Ther Ex             Supine chin tuck 10x10''            Upper trap/Lev s' 10x10''                                                                                          Ther Activity                                       Gait Training                                       Modalities

## 2022-11-21 ENCOUNTER — APPOINTMENT (OUTPATIENT)
Dept: PHYSICAL THERAPY | Facility: CLINIC | Age: 42
End: 2022-11-21

## 2022-12-02 DIAGNOSIS — M22.2X1 PATELLOFEMORAL DISORDER OF RIGHT KNEE: ICD-10-CM

## 2022-12-02 RX ORDER — MELOXICAM 15 MG/1
15 TABLET ORAL DAILY
Qty: 30 TABLET | Refills: 0 | Status: SHIPPED | OUTPATIENT
Start: 2022-12-02 | End: 2023-01-01

## 2022-12-27 ENCOUNTER — OFFICE VISIT (OUTPATIENT)
Dept: SLEEP CENTER | Facility: CLINIC | Age: 42
End: 2022-12-27

## 2022-12-27 VITALS
BODY MASS INDEX: 44.41 KG/M2 | DIASTOLIC BLOOD PRESSURE: 82 MMHG | WEIGHT: 293 LBS | SYSTOLIC BLOOD PRESSURE: 134 MMHG | HEART RATE: 88 BPM | HEIGHT: 68 IN

## 2022-12-27 DIAGNOSIS — E66.01 CLASS 3 SEVERE OBESITY DUE TO EXCESS CALORIES WITHOUT SERIOUS COMORBIDITY WITH BODY MASS INDEX (BMI) OF 45.0 TO 49.9 IN ADULT (HCC): ICD-10-CM

## 2022-12-27 DIAGNOSIS — G47.33 OBSTRUCTIVE SLEEP APNEA TREATED WITH CONTINUOUS POSITIVE AIRWAY PRESSURE (CPAP): Primary | ICD-10-CM

## 2022-12-27 DIAGNOSIS — Z99.89 OBSTRUCTIVE SLEEP APNEA TREATED WITH CONTINUOUS POSITIVE AIRWAY PRESSURE (CPAP): Primary | ICD-10-CM

## 2022-12-27 NOTE — PROGRESS NOTES
Progress Note - [address] [place] [address]  38 y o  female   QNQ:6/36/2225, MRN: [id number]  12/27/2022          Follow Up Evaluation / Problem:     Obstructive Sleep Apnea      Thank you for the opportunity of participating in the evaluation and care of this patient in the Sleep Clinic at [facility]  HPI: Julissa Miramontes is a 38y o  year old female  The patient presents for follow up of moderate obstructive sleep apnea  She had a home sleep study performed on 6/10/2022 which showed moderate obstructive sleep apnea with an DOMINGUEZ of 15 2  She was set up with an auto CPAP on 7/5/2022  At first, she had difficulty in getting used to the mask  Currently, she is using it consistently  She is still experiencing daytime sleepiness but is using her CPAP without difficulty  She is here to discuss compliance and effectiveness of treatment  Review of Systems      Genitourinary hot flashes at night   Cardiology none   Gastrointestinal none   Neurology frequent headaches, awaken with headache and numbness/tingling of an extremity   Constitutional fatigue   Integumentary none   Psychiatry anxiety and depression   Musculoskeletal none   Pulmonary snoring   ENT none   Endocrine none   Hematological none           Current Outpatient Medications:   •  amLODIPine (NORVASC) 5 mg tablet, TAKE 1 TABLET (5 MG TOTAL) BY MOUTH DAILY  , Disp: 90 tablet, Rfl: 2  •  Azelastine HCl 137 MCG/SPRAY SOLN, 1-2 puffs each nostril twice a day when necessary nasal congestion, Disp: 90 mL, Rfl: 3  •  calcium carbonate (OS-MAUREEN) 600 MG tablet, Take 600 mg by mouth daily, Disp: , Rfl:   •  Cetirizine HCl 10 MG CAPS, Take 1 capsule by mouth daily as needed , Disp: , Rfl:   •  DULoxetine (CYMBALTA) 60 mg delayed release capsule, Take 1 capsule (60 mg total) by mouth daily, Disp: 90 capsule, Rfl: 1  •  EPINEPHrine (EPIPEN) 0 3 mg/0 3 mL SOAJ, Inject 0 3 mL (0 3 mg total) into a muscle once for 1 dose, Disp: 0 6 mL, Rfl: 0  •  ibuprofen (MOTRIN) 400 mg tablet, Take 400 mg by mouth every 6 (six) hours as needed, Disp: , Rfl:   •  LORazepam (ATIVAN) 0 5 mg tablet, Take 1 tablet (0 5 mg total) by mouth every 6 (six) hours as needed for anxiety, Disp: 30 tablet, Rfl: 0  •  meloxicam (MOBIC) 15 mg tablet, TAKE 1 TABLET (15 MG TOTAL) BY MOUTH DAILY  , Disp: 30 tablet, Rfl: 0  •  montelukast (SINGULAIR) 10 mg tablet, Take 1 tablet (10 mg total) by mouth daily at bedtime, Disp: 90 tablet, Rfl: 3  •  Multiple Vitamin (MULTIVITAMIN PO), Take by mouth in the morning, Disp: , Rfl:   •  Tuberculin-Allergy Syringes (ALLERGY SYRINGE 1CC/27GX1/2") 27G X 1/2" 1 ML MISC, by Subcutaneous (multi inj) route once a week 1 box of 100 ct, Disp: 100 each, Rfl: 1    [place] Sleepiness Scale  Sitting and reading: Moderate chance of dozing  Watching TV: Moderate chance of dozing  Sitting, inactive in a public place (e g  a theatre or a meeting): Slight chance of dozing  As a passenger in a car for an hour without a break: High chance of dozing  Lying down to rest in the afternoon when circumstances permit: High chance of dozing  Sitting and talking to someone: Would never doze  Sitting quietly after a lunch without alcohol: Slight chance of dozing  In a car, while stopped for a few minutes in traffic: Slight chance of dozing  Total score: 13              Vitals:    12/27/22 1103   BP: 134/82   BP Location: Left arm   Patient Position: Sitting   Cuff Size: Large   Pulse: 88   Weight: (!) 142 kg (312 lb)   Height: 5' 8" (1 727 m)       Body mass index is 47 44 kg/m²  EPWORTH SLEEPINESS SCORE  Total score: 13      Past History Since Last Sleep Center Visit:     States since she has gotten over her cold and found a mask that she likes she is able to consistently use her CPAP  She states she missed a few days over Thanksgiving as she traveled and forgot to take it with her    Unfortunately, even with consistent usage she still feels daytime sleepiness  She has a follow-up appoint with her PCP on January 9 and is going to discuss her daytime sleepiness  Her PCP ordered blood work which she will have done prior to her office visit  He does not have any history of cardiac disease, but has found out several of her family members have an enlarged heart  She is going to discuss this with her PCP and see if any cardiac testing is warranted  The patient reports that she cleans the equipment appropriately and changes supplies on a regular basis  The review of systems and following portions of the patient's history were reviewed and updated as appropriate: allergies, current medications, past family history, past medical history, past social history, past surgical history, and problem list         OBJECTIVE  Equipment set up date:  7/5/22  PAP Pressure: Nasal AutoPAP using a lower limit of 5 cm and an upper limit of 20 cm of water pressure  Type of mask used: full face F30  DME Provider: Leighann Turner  Denies aerophagia or AM headaches    Physical Exam:     General Appearance:   Alert, cooperative, no distress, appears stated age, obese     Head:   Normocephalic, without obvious abnormality, atraumatic     Eyes:  conjunctiva/corneas clear                        Lungs:      Clear to auscultation bilaterally, respirations unlabored     Heart:   Regular rate and rhythm, S1 and S2 normal, no murmur, rub or gallop       Extremities:  Extremities normal, atraumatic, no cyanosis or edema       Skin:  Skin color, texture, turgor normal, no rashes or lesions       Neurologic:  No focal deficits noted       ASSESSMENT / PLAN    1  Obstructive sleep apnea treated with continuous positive airway pressure (CPAP)  Assessment & Plan:  Was diagnosed with moderate obstructive sleep apnea on a home sleep study obtained on 6/10/2022  She was set up with CPAP on 7/5/2022    She had to adjust to using her mask, but now is using her CPAP consistently  Unfortunately, she is still experiencing daytime sleepiness  She has a follow-up appoint with her PCP on January 9 and will discuss this with her  The patient believes her daytime sleepiness may be coming from her anxiety or depression not being treated adequately  Also, she has discovered some recent cardiac family history and will discuss that with her doctor as well to see if any testing is warranted  I advised her from a sleep medicine standpoint she is doing excellent  She will follow-up with us in 1 year or sooner if she has any concerns  Orders:  -     PAP DME Resupply/Reorder    2  Class 3 severe obesity due to excess calories without serious comorbidity with body mass index (BMI) of 45 0 to 49 9 in [facility])  Assessment & Plan:  We discussed that weight loss could improve her overall health and potentially improve or resolve obstructive sleep apnea  Counseling / Coordination of Care  Total clinic time spent today 30 minutes in chart review, face-to-face time spent with the patient, coordination of care and documentation  A description of the counseling / coordination of care:     Impressions, Diagnostic results, Prognosis, Instructions for management, Risks and benefits of treatment, Patient and family education, Risk factor reductions and Importance of compliance with treatment    Today I reviewed the patient's compliance data  she has been able to use the equipment 87% of all days recorded  Average usage was 4 or more hours 83% of all days recorded  The patient uses the equipment for an average of 5 hours and 55 minutes per night  The estimated AHI is 1 8 abnormal breathing events per hour  The patient feels they benefit from the use of PAP equipment and would like to continue PAP therapy  Response to treatment has been good  A prescription for supplies has been provided to last for the next year      She will continue using this equipment at the settings noted above for the next 12 months  At that timeshe will then return for a routine follow-up evaluation  I have asked the patient to contact the Sleep [address] [facility] if she encounters any difficulties prior to that time  The following instructions have been given to the patient today:    Patient Instructions   Your compliance report looks excellent  Continue using your CPAP nightly  Follow-up with your PCP in regards to your daytime sleepiness  Follow-up in 1 year or sooner if you have any concerns  Nursing Support:  When: Monday through Friday 7A-5PM except holidays  Where: Our direct line is [phone]  If you are having a true emergency please call 911  In the event that the line is busy or it is after hours please leave a voice message and we will return your call  Please speak clearly, leaving your full name, birth date, best number to reach you and the reason for your call  Medication refills: We will need the name of the medication, the dosage, the ordering provider, whether you get a 30 or 90 day refill, and the pharmacy name and address  Medications will be ordered by the provider only  Nurses cannot call in prescriptions  Please allow 7 days for medication refills  Physician requested updates: If your provider requested that you call with an update after starting medication, please be ready to provide us the medication and dosage, what time you take your medication, the time you attempt to fall asleep, time you fall asleep, when you wake up, and what time you get out of bed  Sleep Study Results: We will contact you with sleep study results and/or next steps after the physician has reviewed your testing             RYAN Anthony 15

## 2022-12-27 NOTE — ASSESSMENT & PLAN NOTE
Was diagnosed with moderate obstructive sleep apnea on a home sleep study obtained on 6/10/2022  She was set up with CPAP on 7/5/2022  She had to adjust to using her mask, but now is using her CPAP consistently  Unfortunately, she is still experiencing daytime sleepiness  She has a follow-up appoint with her PCP on January 9 and will discuss this with her  The patient believes her daytime sleepiness may be coming from her anxiety or depression not being treated adequately  Also, she has discovered some recent cardiac family history and will discuss that with her doctor as well to see if any testing is warranted  I advised her from a sleep medicine standpoint she is doing excellent  She will follow-up with us in 1 year or sooner if she has any concerns

## 2022-12-27 NOTE — PATIENT INSTRUCTIONS
Your compliance report looks excellent  Continue using your CPAP nightly  Follow-up with your PCP in regards to your daytime sleepiness  Follow-up in 1 year or sooner if you have any concerns  Nursing Support:  When: Monday through Friday 7A-5PM except holidays  Where: Our direct line is [phone]  If you are having a true emergency please call 911  In the event that the line is busy or it is after hours please leave a voice message and we will return your call  Please speak clearly, leaving your full name, birth date, best number to reach you and the reason for your call  Medication refills: We will need the name of the medication, the dosage, the ordering provider, whether you get a 30 or 90 day refill, and the pharmacy name and address  Medications will be ordered by the provider only  Nurses cannot call in prescriptions  Please allow 7 days for medication refills  Physician requested updates: If your provider requested that you call with an update after starting medication, please be ready to provide us the medication and dosage, what time you take your medication, the time you attempt to fall asleep, time you fall asleep, when you wake up, and what time you get out of bed  Sleep Study Results: We will contact you with sleep study results and/or next steps after the physician has reviewed your testing

## 2022-12-27 NOTE — ASSESSMENT & PLAN NOTE
We discussed that weight loss could improve her overall health and potentially improve or resolve obstructive sleep apnea

## 2022-12-29 ENCOUNTER — TELEPHONE (OUTPATIENT)
Dept: SLEEP CENTER | Facility: CLINIC | Age: 42
End: 2022-12-29

## 2022-12-29 LAB
ALBUMIN SERPL-MCNC: 4.4 G/DL (ref 3.8–4.8)
ALBUMIN/GLOB SERPL: 1.6 {RATIO} (ref 1.2–2.2)
ALP SERPL-CCNC: 92 IU/L (ref 44–121)
ALT SERPL-CCNC: 17 IU/L (ref 0–32)
AST SERPL-CCNC: 15 IU/L (ref 0–40)
BILIRUB SERPL-MCNC: 0.3 MG/DL (ref 0–1.2)
BUN SERPL-MCNC: 11 MG/DL (ref 6–24)
BUN/CREAT SERPL: 16 (ref 9–23)
CALCIUM SERPL-MCNC: 9.8 MG/DL (ref 8.7–10.2)
CHLORIDE SERPL-SCNC: 102 MMOL/L (ref 96–106)
CHOLEST SERPL-MCNC: 252 MG/DL (ref 100–199)
CHOLEST/HDLC SERPL: 5.1 RATIO (ref 0–4.4)
CO2 SERPL-SCNC: 24 MMOL/L (ref 20–29)
CREAT SERPL-MCNC: 0.7 MG/DL (ref 0.57–1)
DME PARACHUTE DELIVERY DATE REQUESTED: NORMAL
DME PARACHUTE ITEM DESCRIPTION: NORMAL
DME PARACHUTE ORDER STATUS: NORMAL
DME PARACHUTE SUPPLIER NAME: NORMAL
DME PARACHUTE SUPPLIER PHONE: NORMAL
EGFR: 109 ML/MIN/1.73
EST. AVERAGE GLUCOSE BLD GHB EST-MCNC: 134 MG/DL
GLOBULIN SER-MCNC: 2.7 G/DL (ref 1.5–4.5)
GLUCOSE SERPL-MCNC: 118 MG/DL (ref 70–99)
HBA1C MFR BLD: 6.3 % (ref 4.8–5.6)
HDLC SERPL-MCNC: 49 MG/DL
LDLC SERPL CALC-MCNC: 150 MG/DL (ref 0–99)
LDLC SERPL DIRECT ASSAY-MCNC: 159 MG/DL (ref 0–99)
POTASSIUM SERPL-SCNC: 4.1 MMOL/L (ref 3.5–5.2)
PROT SERPL-MCNC: 7.1 G/DL (ref 6–8.5)
SL AMB VLDL CHOLESTEROL CALC: 53 MG/DL (ref 5–40)
SODIUM SERPL-SCNC: 140 MMOL/L (ref 134–144)
TRIGL SERPL-MCNC: 286 MG/DL (ref 0–149)

## 2022-12-30 ENCOUNTER — HOSPITAL ENCOUNTER (OUTPATIENT)
Dept: MAMMOGRAPHY | Facility: MEDICAL CENTER | Age: 42
Discharge: HOME | End: 2022-12-30

## 2022-12-30 VITALS — WEIGHT: 293 LBS | BODY MASS INDEX: 44.41 KG/M2 | HEIGHT: 68 IN

## 2022-12-30 DIAGNOSIS — Z12.31 ENCOUNTER FOR SCREENING MAMMOGRAM FOR BREAST CANCER: ICD-10-CM

## 2022-12-31 DIAGNOSIS — M22.2X1 PATELLOFEMORAL DISORDER OF RIGHT KNEE: ICD-10-CM

## 2023-01-03 LAB
DME PARACHUTE DELIVERY DATE ACTUAL: NORMAL
DME PARACHUTE DELIVERY DATE REQUESTED: NORMAL
DME PARACHUTE ITEM DESCRIPTION: NORMAL
DME PARACHUTE ORDER STATUS: NORMAL
DME PARACHUTE SUPPLIER NAME: NORMAL
DME PARACHUTE SUPPLIER PHONE: NORMAL

## 2023-01-03 RX ORDER — MELOXICAM 15 MG/1
15 TABLET ORAL DAILY
Qty: 30 TABLET | Refills: 0 | Status: SHIPPED | OUTPATIENT
Start: 2023-01-03 | End: 2023-02-02

## 2023-01-10 ENCOUNTER — TELEPHONE (OUTPATIENT)
Dept: FAMILY MEDICINE CLINIC | Facility: CLINIC | Age: 43
End: 2023-01-10

## 2023-01-19 ENCOUNTER — OFFICE VISIT (OUTPATIENT)
Dept: FAMILY MEDICINE CLINIC | Facility: CLINIC | Age: 43
End: 2023-01-19

## 2023-01-19 VITALS
WEIGHT: 293 LBS | DIASTOLIC BLOOD PRESSURE: 80 MMHG | OXYGEN SATURATION: 97 % | TEMPERATURE: 98 F | SYSTOLIC BLOOD PRESSURE: 124 MMHG | BODY MASS INDEX: 44.41 KG/M2 | RESPIRATION RATE: 18 BRPM | HEART RATE: 68 BPM | HEIGHT: 68 IN

## 2023-01-19 DIAGNOSIS — F41.1 GENERALIZED ANXIETY DISORDER: ICD-10-CM

## 2023-01-19 DIAGNOSIS — I10 PRIMARY HYPERTENSION: ICD-10-CM

## 2023-01-19 DIAGNOSIS — F33.9 DEPRESSION, RECURRENT (HCC): ICD-10-CM

## 2023-01-19 DIAGNOSIS — E78.2 MIXED HYPERLIPIDEMIA: ICD-10-CM

## 2023-01-19 DIAGNOSIS — F33.9 DEPRESSION, RECURRENT (HCC): Primary | ICD-10-CM

## 2023-01-19 DIAGNOSIS — R73.03 PREDIABETES: ICD-10-CM

## 2023-01-19 RX ORDER — ROSUVASTATIN CALCIUM 10 MG/1
10 TABLET, COATED ORAL DAILY
Qty: 30 TABLET | Refills: 5 | Status: SHIPPED | OUTPATIENT
Start: 2023-01-19 | End: 2023-02-11

## 2023-01-19 RX ORDER — DULOXETIN HYDROCHLORIDE 30 MG/1
30 CAPSULE, DELAYED RELEASE ORAL DAILY
Qty: 30 CAPSULE | Refills: 1 | Status: SHIPPED | OUTPATIENT
Start: 2023-01-19 | End: 2023-01-20

## 2023-01-19 RX ORDER — LORAZEPAM 0.5 MG/1
0.5 TABLET ORAL EVERY 6 HOURS PRN
Qty: 30 TABLET | Refills: 0 | Status: SHIPPED | OUTPATIENT
Start: 2023-01-19

## 2023-01-19 NOTE — PATIENT INSTRUCTIONS
When you are ready to try weaning off the medication we can  try weaning you off of the duloxetine as follows:  Week 1:  Take 30 mg on Monday and Wed, and 60 mg all other days  Week 2: Take 30 mg Mon, Wed, Friday and Sun,  And 60 mg the other days  Week 3: 30 mg daily  Week 4:  Skip Mon, 30 mg other days  Week 5:  Skip the medication Mon and Wed  And take 30 mg the other days  - Continue to eliminate 30 mg daily gradually until off of this  Please call the office if the anxiety symptoms come back  Start the sertraline while weaning off this

## 2023-01-19 NOTE — PROGRESS NOTES
Assessment/Plan:  Problem List Items Addressed This Visit        Cardiovascular and Mediastinum    Primary hypertension     The patient's blood pressure is well controlled with her current medication  We have made no changes today  We will continue to monitor closely  We will see her back as scheduled  Other    Depression, recurrent ([facility] [address] ) - Primary     The patient is having worsening depression-we are going to wean her off the duloxetine and we will start her on sertraline 50 mg daily  We will wean her off the duloxetine as follows: When you are ready to try weaning off the medication we can  try weaning you off of the duloxetine as follows:  Week 1:  Take 30 mg on Monday and Wed, and 60 mg all other days  Week 2: Take 30 mg Mon, Wed, Friday and Sun,  And 60 mg the other days  Week 3: 30 mg daily  Week 4:  Skip Mon, 30 mg other days  Week 5:  Skip the medication Mon and Wed  And take 30 mg the other days  - Continue to eliminate 30 mg daily gradually until off of this  Please call the office if the anxiety symptoms come back  Start the sertraline while weaning off this  We will monitor her very closely and we will see her back as scheduled  Relevant Medications    sertraline (Zoloft) 50 mg tablet    LORazepam (ATIVAN) 0 5 mg tablet    Generalized anxiety disorder    Relevant Medications    sertraline (Zoloft) 50 mg tablet    LORazepam (ATIVAN) 0 5 mg tablet    Mixed hyperlipidemia    Relevant Medications    rosuvastatin (CRESTOR) 10 MG tablet    Other Relevant Orders    Comprehensive metabolic panel    LDL cholesterol, direct    Lipid panel    Prediabetes    Relevant Orders    Comprehensive metabolic panel    Hemoglobin A1C       Return in about 2 months (around 3/19/2023) for Recheck     I spent 15 minutes during the visit reviewing the history from the patient, performing the examination, discussing the findings with the patient, providing counseling and education, and making a plan  I spent 15 minutes ordering referrals and testing and documenting  Subjective:   Chief Complaint   Patient presents with   • Follow-up     Routine 3 month  Review lab results  • Anxiety     Pt would like to discuss duloxetine 60mg, she reports she doesn't feel like it is working  Patient ID: Shereen Méndez is a 38 y o  female presents today for increased depression and anxiety  Daria Levy is a 38 y o  female who presents today for routine checkup of her hypertension, depression, anxiety  She has been having worsening depression and anxiety symptoms over the last several weeks despite being on her medication  She is interested in possibly getting her medication changed  She feels that the anxiety and the depression has been worse over the last several weeks  Her mother and her uncle have enlarged hearts and she is concerned  The patient denies any chest pain, shortness of breath, or palpitations  There is no edema  There are no headaches or visual changes  There is no lightheadedness, dizziness, or fainting spells  She is having a hard time focusing and gets upset at certain things  She has been on Lexapro in the past and that lost effectiveness  There are no suicidal thoughts  She is down more and she does not want to do anything  She can's sit still  She has not seen a therapist before and is looking into scheduling  Hypertension  This is a chronic problem  The current episode started more than 1 year ago  The problem is unchanged  The problem is controlled  Associated symptoms include anxiety  Pertinent negatives include no blurred vision, chest pain, headaches, malaise/fatigue, neck pain, orthopnea, palpitations, peripheral edema, PND, shortness of breath or sweats  The current treatment provides significant improvement  Anxiety  Symptoms include decreased concentration  Patient reports no chest pain, palpitations, shortness of breath or suicidal ideas           The following portions of the patient's history were reviewed and updated as appropriate: allergies, current medications, past family history, past medical history, past social history, past surgical history and problem list   Patient Active Problem List   Diagnosis   • Depression, recurrent ([facility] )   • Generalized anxiety disorder   • S/P abdominal supracervical subtotal hysterectomy   • Allergic rhinitis   • Disturbance in sleep behavior   • Class 3 severe obesity due to excess calories without serious comorbidity with body mass index (BMI) of 45 0 to 49 9 in adult ([facility] )   • Muscle strain of left thigh   • Obstructive sleep apnea treated with continuous positive airway pressure (CPAP)   • Primary sleep disturbance   • Excessive daytime sleepiness   • Nocturia   • Primary hypertension   • Snoring   • Prediabetes   • Mixed hyperlipidemia     Past Medical History:   Diagnosis Date   • Allergic rhinitis    • Anxiety    • Depression    • Family health problem     mother and grandmother h/o blood clots after surgery   • Fibroid    • Fullness in ear, left     Resolved [id number]   • Hypertension     Resolved [id number]   • Urinary tract infection     h/o     Past Surgical History:   Procedure Laterality Date   • CHOLECYSTECTOMY     • HYSTERECTOMY     • NM TOTAL ABDOMINAL HYSTERECT W/WO RMVL TUBE OVARY N/A 6/18/2019    Procedure: Supracervical IWONA, removal of bilat tubes, removal of IUD, cystoscopy;  Surgeon: Anne Alvarez MD;  Location: [facility] OR;  Service: Gynecology   • TOOTH EXTRACTION       Allergies   Allergen Reactions   • Pollen Extract Allergic Rhinitis     Family History   Problem Relation Age of Onset   • Anxiety disorder Mother    • Other Mother         Blood clots   • Hypertension Mother    • Hyperlipidemia Mother    • Alcohol abuse Father    • Hypertension Brother    • Anxiety disorder Maternal Grandmother    • Other Maternal Grandmother         Blood clots   • Lung cancer Maternal Grandfather    • No Known Problems Paternal Grandmother    • No Known Problems Paternal Grandfather    • Ovarian cancer Maternal Aunt 76   • Alcohol abuse Maternal Aunt 72   • Prostate cancer Maternal Uncle    • No Known Problems Paternal Aunt    • Heart disease Family         cardiac disorder   • Cancer Family         lung ca   • Diabetes Family    • Cancer Family      Social History     Socioeconomic History   • Marital status: /Civil Union     Spouse name: Not on file   • Number of children: Not on file   • Years of education: Not on file   • Highest education level: Not on file   Occupational History   • Not on file   Tobacco Use   • Smoking status: Never   • Smokeless tobacco: Never   Vaping Use   • Vaping Use: Never used   Substance and Sexual Activity   • Alcohol use: Yes     Comment: rare   • Drug use: No   • Sexual activity: Yes     Partners: Male     Birth control/protection: Female Sterilization   Other Topics Concern   • Not on file   Social History Narrative    Always uses seat belt    Caffeine use    Christianity [employer] ([employer])     Social Determinants of Health     Financial Resource Strain: Not on file   Food Insecurity: Not on file   Transportation Needs: Not on file   Physical Activity: Inactive   • Days of Exercise per Week: 0 days   • Minutes of Exercise per Session: 0 min   Stress: No Stress Concern Present   • Feeling of Stress : Only a little   Social Connections: Not on file   Intimate Partner Violence: Not At Risk   • Fear of Current or Ex-Partner: No   • Emotionally Abused: No   • Physically Abused: No   • Sexually Abused: No   Housing Stability: Not on file     Current Outpatient Medications on File Prior to Visit   Medication Sig Dispense Refill   • amLODIPine (NORVASC) 5 mg tablet TAKE 1 TABLET (5 MG TOTAL) BY MOUTH DAILY   90 tablet 2   • Azelastine HCl 137 MCG/SPRAY SOLN 1-2 puffs each nostril twice a day when necessary nasal congestion 90 mL 3   • calcium carbonate (OS-MAUREEN) 600 MG tablet Take 600 mg by mouth daily     • Cetirizine HCl 10 MG CAPS Take 1 capsule by mouth daily as needed      • DULoxetine (CYMBALTA) 60 mg delayed release capsule Take 1 capsule (60 mg total) by mouth daily 90 capsule 1   • EPINEPHrine (EPIPEN) 0 3 mg/0 3 mL SOAJ Inject 0 3 mL (0 3 mg total) into a muscle once for 1 dose 0 6 mL 0   • ibuprofen (MOTRIN) 400 mg tablet Take 400 mg by mouth every 6 (six) hours as needed     • meloxicam (MOBIC) 15 mg tablet TAKE 1 TABLET (15 MG TOTAL) BY MOUTH DAILY  30 tablet 0   • montelukast (SINGULAIR) 10 mg tablet Take 1 tablet (10 mg total) by mouth daily at bedtime 90 tablet 3   • Multiple Vitamin (MULTIVITAMIN PO) Take by mouth in the morning     • Tuberculin-Allergy Syringes (ALLERGY SYRINGE 1CC/27GX1/2") 27G X 1/2" 1 ML MISC by Subcutaneous (multi inj) route once a week 1 box of 100 ct 100 each 1     No current facility-administered medications on file prior to visit  Review of Systems   Constitutional: Negative  Negative for malaise/fatigue  HENT: Negative  Eyes: Negative  Negative for blurred vision  Respiratory: Negative  Negative for shortness of breath  Cardiovascular: Negative  Negative for chest pain, palpitations, orthopnea and PND  Gastrointestinal: Negative  Endocrine: Negative  Genitourinary: Negative  Musculoskeletal: Negative  Negative for neck pain  Skin: Negative  Allergic/Immunologic: Negative  Neurological: Negative  Negative for headaches  Hematological: Negative  Psychiatric/Behavioral: Positive for decreased concentration and dysphoric mood  Negative for suicidal ideas  Objective:  Vitals:    01/19/23 1607 01/19/23 1636   BP: 130/98 124/80   BP Location: Left arm    Patient Position: Sitting    Cuff Size: Large    Pulse: 98 68   Resp: 18    Temp: 98 °F (36 7 °C)    TempSrc: Tympanic    SpO2: 97%    Weight: (!) 143 kg (315 lb 9 6 oz)    Height: 5' 8" (1 727 m)      Body mass index is 47 99 kg/m²       Physical Exam  Constitutional:       Appearance: She is well-developed  HENT:      Head: Normocephalic and atraumatic  Mouth/Throat:      Pharynx: No oropharyngeal exudate  Eyes:      Conjunctiva/sclera: Conjunctivae normal       Pupils: Pupils are equal, round, and reactive to light  Neck:      Thyroid: No thyromegaly  Vascular: No JVD  Trachea: No tracheal deviation  Cardiovascular:      Rate and Rhythm: Normal rate and regular rhythm  Heart sounds: Normal heart sounds  No murmur heard  No friction rub  No gallop  Pulmonary:      Effort: Pulmonary effort is normal  No respiratory distress  Breath sounds: Normal breath sounds  No stridor  No wheezing or rales  Chest:      Chest wall: No tenderness  Abdominal:      General: Bowel sounds are normal  There is no distension  Palpations: Abdomen is soft  There is no mass  Tenderness: There is no abdominal tenderness  There is no guarding or rebound  Musculoskeletal:         General: No tenderness or deformity  Normal range of motion  Cervical back: Normal range of motion  Lymphadenopathy:      Cervical: No cervical adenopathy  Skin:     General: Skin is warm and dry  Neurological:      Mental Status: She is alert and oriented to person, place, and time  Cranial Nerves: No cranial nerve deficit  Motor: No abnormal muscle tone  Coordination: Coordination normal       Deep Tendon Reflexes: Reflexes are normal and symmetric  Reflexes normal             BMI Counseling: Body mass index is 47 99 kg/m²  The BMI is above normal  Nutrition recommendations include decreasing portion sizes, encouraging healthy choices of fruits and vegetables, decreasing fast food intake, consuming healthier snacks, limiting drinks that contain sugar, moderation in carbohydrate intake, increasing intake of lean protein, reducing intake of saturated and trans fat and reducing intake of cholesterol   Exercise recommendations include exercising 3-5 times per week and strength training exercises  No pharmacotherapy was ordered  Patient referred to PCP  Rationale for BMI follow-up plan is due to patient being overweight or obese

## 2023-01-20 RX ORDER — DULOXETIN HYDROCHLORIDE 30 MG/1
30 CAPSULE, DELAYED RELEASE ORAL DAILY
Qty: 30 CAPSULE | Refills: 2 | Status: SHIPPED | OUTPATIENT
Start: 2023-01-20

## 2023-01-23 NOTE — ASSESSMENT & PLAN NOTE
The patient's blood pressure is well controlled with her current medication  We have made no changes today  We will continue to monitor closely  We will see her back as scheduled

## 2023-01-23 NOTE — ASSESSMENT & PLAN NOTE
The patient is having worsening depression-we are going to wean her off the duloxetine and we will start her on sertraline 50 mg daily  We will wean her off the duloxetine as follows: When you are ready to try weaning off the medication we can  try weaning you off of the duloxetine as follows:  Week 1:  Take 30 mg on Monday and Wed, and 60 mg all other days  Week 2: Take 30 mg Mon, Wed, Friday and Sun,  And 60 mg the other days  Week 3: 30 mg daily  Week 4:  Skip Mon, 30 mg other days  Week 5:  Skip the medication Mon and Wed  And take 30 mg the other days  - Continue to eliminate 30 mg daily gradually until off of this  Please call the office if the anxiety symptoms come back  Start the sertraline while weaning off this  We will monitor her very closely and we will see her back as scheduled

## 2023-01-31 DIAGNOSIS — M22.2X1 PATELLOFEMORAL DISORDER OF RIGHT KNEE: ICD-10-CM

## 2023-01-31 RX ORDER — MELOXICAM 15 MG/1
15 TABLET ORAL DAILY
Qty: 30 TABLET | Refills: 0 | Status: SHIPPED | OUTPATIENT
Start: 2023-01-31 | End: 2023-03-02

## 2023-02-11 DIAGNOSIS — E78.2 MIXED HYPERLIPIDEMIA: ICD-10-CM

## 2023-02-11 RX ORDER — ROSUVASTATIN CALCIUM 10 MG/1
TABLET, COATED ORAL
Qty: 90 TABLET | Refills: 2 | Status: SHIPPED | OUTPATIENT
Start: 2023-02-11

## 2023-02-12 DIAGNOSIS — F33.9 DEPRESSION, RECURRENT (HCC): ICD-10-CM

## 2023-02-12 DIAGNOSIS — F41.1 GENERALIZED ANXIETY DISORDER: ICD-10-CM

## 2023-02-12 RX ORDER — DULOXETIN HYDROCHLORIDE 30 MG/1
CAPSULE, DELAYED RELEASE ORAL
Qty: 90 CAPSULE | Refills: 1 | Status: SHIPPED | OUTPATIENT
Start: 2023-02-12

## 2023-05-01 ENCOUNTER — OFFICE VISIT (OUTPATIENT)
Dept: FAMILY MEDICINE CLINIC | Facility: CLINIC | Age: 43
End: 2023-05-01

## 2023-05-01 VITALS
SYSTOLIC BLOOD PRESSURE: 136 MMHG | WEIGHT: 293 LBS | BODY MASS INDEX: 44.41 KG/M2 | TEMPERATURE: 98.6 F | OXYGEN SATURATION: 98 % | RESPIRATION RATE: 18 BRPM | HEIGHT: 68 IN | HEART RATE: 91 BPM | DIASTOLIC BLOOD PRESSURE: 82 MMHG

## 2023-05-01 DIAGNOSIS — M25.531 BILATERAL WRIST PAIN: ICD-10-CM

## 2023-05-01 DIAGNOSIS — M25.532 BILATERAL WRIST PAIN: ICD-10-CM

## 2023-05-01 DIAGNOSIS — G56.03 BILATERAL CARPAL TUNNEL SYNDROME: ICD-10-CM

## 2023-05-01 DIAGNOSIS — F33.9 DEPRESSION, RECURRENT (HCC): ICD-10-CM

## 2023-05-01 DIAGNOSIS — F41.1 GENERALIZED ANXIETY DISORDER: Primary | ICD-10-CM

## 2023-05-01 RX ORDER — ARM BRACE
EACH MISCELLANEOUS DAILY
Qty: 2 EACH | Refills: 0 | Status: SHIPPED | OUTPATIENT
Start: 2023-05-01

## 2023-05-01 NOTE — ASSESSMENT & PLAN NOTE
The patient feels much calmer and has much less anxiety taking the sertraline 50 mg daily and being off of the duloxetine  We will continue her on this current dose  We will see her back in the office as scheduled

## 2023-05-01 NOTE — ASSESSMENT & PLAN NOTE
The patient has now completely weaned off of the duloxetine  She is doing very well on the sertraline overall  She has noticed a huge improvement in her depression  We will maintain her on the sertraline 50 mg daily as prescribed  We will continue to monitor her closely

## 2023-05-01 NOTE — PROGRESS NOTES
Assessment/Plan:  Problem List Items Addressed This Visit        Nervous and Auditory    Bilateral carpal tunnel syndrome     I believe that the patient's wrist pain and numbness is related to bilateral carpal tunnel syndrome which just started up recently  We will do a trial of the carpal tunnel wrist braces at night and Voltaren as well as stretching exercises  If no improvement with conservative treatment, we will refer the patient for an EMG and further follow-up  We will follow-up with her accordingly  Relevant Medications    Elastic Bandages & Supports (B & B Carpal Tunnel Brace) MISC    Diclofenac Sodium (VOLTAREN) 1 %       Other    Depression, recurrent (HCC)     The patient has now completely weaned off of the duloxetine  She is doing very well on the sertraline overall  She has noticed a huge improvement in her depression  We will maintain her on the sertraline 50 mg daily as prescribed  We will continue to monitor her closely  Generalized anxiety disorder - Primary     The patient feels much calmer and has much less anxiety taking the sertraline 50 mg daily and being off of the duloxetine  We will continue her on this current dose  We will see her back in the office as scheduled  Other Visit Diagnoses     Bilateral wrist pain        Relevant Medications    Diclofenac Sodium (VOLTAREN) 1 %          Return in about 3 months (around 8/1/2023) for Recheck  I spent 15 minutes during the visit reviewing the history from the patient, performing the examination, discussing the findings with the patient, providing counseling and education, and making a plan  I spent 15 minutes ordering referrals and testing and documenting  Subjective:   Chief Complaint   Patient presents with    2 month follow-up     Patient reports improvements to her depression with the changes to her Cymbalta and the addition of the Sertraline      Carpal Tunnel     Patient suspects carpal tunnel in both wrists, worse in the left side  Patient reports that the pain and numbness has been ongoing over the last 2 weeks - note it's worse in the mornings and with increased activity  Patient ID: Antonette Adler is a 38 y o  female presents today for follow-up of her anxiety and depression  Antonette Adler is a 38 y o  female who presents today for follow-up of her anxiety and depression  At her last visit we decided to wean her off of the duloxetine and switch her to sertraline 50 mg daily  She is currently on the sertraline alone and is doing very well with this  Her anxiety is much less and she feels much calmer  There are no depression symptoms  She is doing much better on the Sertraline and she is calmer  There is less depression   She is getting over a GI virus  She is getting her appetite back and she is feeling better  She started with symptoms with pain I the left wrist  In the thumb and the first 3 fingers on both side  There is numbness and tingling  She thought it was because of the way she was sleeping  She has had this for a while on and off  There is weakness   She is right hand dominant   She has tried Ibuprofen or Tylenol with relief with wrapping  It hurts the most in the am when she gets up  She is off the Duloxetine all together  Anxiety  Presents for follow-up visit  Patient reports no chest pain, compulsions, confusion, decreased concentration, depressed mood, dizziness, dry mouth, excessive worry, feeling of choking, hyperventilation, impotence, insomnia, irritability, malaise, muscle tension, nausea, nervous/anxious behavior, obsessions, palpitations, panic, restlessness, shortness of breath or suicidal ideas           The following portions of the patient's history were reviewed and updated as appropriate: allergies, current medications, past family history, past medical history, past social history, past surgical history and problem list   Patient Active Problem List   Diagnosis    Depression, recurrent ([facility] )    Generalized anxiety disorder    S/P abdominal supracervical subtotal hysterectomy    Allergic rhinitis    Disturbance in sleep behavior    Class 3 severe obesity due to excess calories without serious comorbidity with body mass index (BMI) of 45 0 to 49 9 in adult ([facility] )    Muscle strain of left thigh    Obstructive sleep apnea treated with continuous positive airway pressure (CPAP)    Primary sleep disturbance    Excessive daytime sleepiness    Nocturia    Primary hypertension    Snoring    Prediabetes    Mixed hyperlipidemia    Bilateral carpal tunnel syndrome     Past Medical History:   Diagnosis Date    Allergic rhinitis     Anxiety     Depression     Family health problem     mother and grandmother h/o blood clots after surgery    Fibroid     Fullness in ear, left     Resolved [id number]    Hypertension     Resolved [id number]    Urinary tract infection     h/o     Past Surgical History:   Procedure Laterality Date    CHOLECYSTECTOMY      HYSTERECTOMY      [place] TOTAL ABDOMINAL HYSTERECT W/WO RMVL TUBE OVARY N/A 6/18/2019    Procedure: Supracervical IWONA, removal of bilat tubes, removal of IUD, cystoscopy;  Surgeon: Roderick Yang MD;  Location: [facility];  Service: Gynecology    TOOTH EXTRACTION       Allergies   Allergen Reactions    Pollen Extract Allergic Rhinitis     Family History   Problem Relation Age of Onset    Anxiety disorder Mother     Other Mother         Blood clots    Hypertension Mother     Hyperlipidemia Mother     Alcohol abuse Father     Hypertension Brother     Anxiety disorder Maternal Grandmother     Other Maternal Grandmother         Blood clots    Lung cancer Maternal Grandfather     No Known Problems Paternal Grandmother     No Known Problems Paternal Grandfather     Ovarian cancer Maternal Aunt 76    Alcohol abuse Maternal Aunt 72    Prostate cancer Maternal Uncle     No Known Problems Paternal Aunt  Heart disease Family         cardiac disorder    Cancer Family         lung ca    Diabetes Family     Cancer Family      Social History     Socioeconomic History    Marital status: /Civil Union     Spouse name: Not on file    Number of children: Not on file    Years of education: Not on file    Highest education level: Not on file   Occupational History    Not on file   Tobacco Use    Smoking status: Never    Smokeless tobacco: Never   Vaping Use    Vaping Use: Never used   Substance and Sexual Activity    Alcohol use: Yes     Comment: rare    Drug use: No    Sexual activity: Yes     Partners: Male     Birth control/protection: Female Sterilization   Other Topics Concern    Not on file   Social History Narrative    Always uses seat belt    Caffeine use    [employer] ([employer])     Social Determinants of Health     Financial Resource Strain: Not on file   Food Insecurity: Not on file   Transportation Needs: Not on file   Physical Activity: Inactive    Days of Exercise per Week: 0 days   [employer] of Exercise per Session: 0 min   Stress: No Stress Concern Present    Feeling of Stress : Only a little   Social Connections: Not on file   Intimate Partner Violence: Not At Risk    Fear of Current or Ex-Partner: No    Emotionally Abused: No    Physically Abused: No    Sexually Abused: No   Housing Stability: Not on file     Current Outpatient Medications on File Prior to Visit   Medication Sig Dispense Refill    amLODIPine (NORVASC) 5 mg tablet TAKE 1 TABLET (5 MG TOTAL) BY MOUTH DAILY   90 tablet 2    Azelastine HCl 137 MCG/SPRAY SOLN 1-2 PUFFS EACH NOSTRIL TWICE A DAY WHEN NECESSARY NASAL CONGESTION 90 mL 1    calcium carbonate (OS-MAUREEN) 600 MG tablet Take 600 mg by mouth daily      Cetirizine HCl 10 MG CAPS Take 1 capsule by mouth daily as needed       EPINEPHrine (EPIPEN) 0 3 mg/0 3 mL SOAJ Inject 0 3 mL (0 3 mg total) into a muscle once for 1 dose 0 6 mL 0    "ibuprofen (MOTRIN) 400 mg tablet Take 400 mg by mouth every 6 (six) hours as needed      LORazepam (ATIVAN) 0 5 mg tablet Take 1 tablet (0 5 mg total) by mouth every 6 (six) hours as needed for anxiety 30 tablet 0    meloxicam (MOBIC) 15 mg tablet TAKE 1 TABLET (15 MG TOTAL) BY MOUTH DAILY  30 tablet 0    montelukast (SINGULAIR) 10 mg tablet TAKE 1 TABLET BY MOUTH DAILY AT BEDTIME 90 tablet 1    Multiple Vitamin (MULTIVITAMIN PO) Take by mouth in the morning      rosuvastatin (CRESTOR) 10 MG tablet TAKE 1 TABLET BY MOUTH EVERY DAY 90 tablet 2    sertraline (Zoloft) 50 mg tablet Take 1 tablet (50 mg total) by mouth daily 90 tablet 3    [DISCONTINUED] DULoxetine (CYMBALTA) 30 mg delayed release capsule TAKE 1 CAPSULE BY MOUTH EVERY DAY 90 capsule 1    [DISCONTINUED] DULoxetine (CYMBALTA) 60 mg delayed release capsule Take 1 capsule (60 mg total) by mouth daily 90 capsule 1    Tuberculin-Allergy Syringes (ALLERGY SYRINGE 1CC/27GX1/2\") 27G X 1/2\" 1 ML MISC by Subcutaneous (multi inj) route once a week 1 box of 100 ct 100 each 1     No current facility-administered medications on file prior to visit  Review of Systems   Constitutional: Negative  Negative for irritability  HENT: Negative  Eyes: Negative  Respiratory: Negative  Negative for shortness of breath  Cardiovascular: Negative  Negative for chest pain and palpitations  Gastrointestinal: Negative  Negative for nausea  Endocrine: Negative  Genitourinary: Negative  Negative for impotence  Musculoskeletal: Negative  Skin: Negative  Allergic/Immunologic: Negative  Neurological: Negative  Negative for dizziness  Hematological: Negative  Psychiatric/Behavioral: Negative  Negative for confusion, decreased concentration and suicidal ideas  The patient is not nervous/anxious and does not have insomnia          Objective:  Vitals:    05/01/23 1527   BP: 136/82   BP Location: Left arm   Patient Position: Sitting   Cuff " "Size: Large   Pulse: 91   Resp: 18   Temp: 98 6 °F (37 °C)   TempSrc: Tympanic   SpO2: 98%   Weight: (!) 142 kg (313 lb)   Height: 5' 8\" (1 727 m)     Body mass index is 47 59 kg/m²  Physical Exam  Constitutional:       Appearance: She is well-developed  HENT:      Head: Normocephalic and atraumatic  Mouth/Throat:      Pharynx: No oropharyngeal exudate  Eyes:      Conjunctiva/sclera: Conjunctivae normal       Pupils: Pupils are equal, round, and reactive to light  Neck:      Thyroid: No thyromegaly  Vascular: No JVD  Trachea: No tracheal deviation  Cardiovascular:      Rate and Rhythm: Normal rate and regular rhythm  Heart sounds: Normal heart sounds  No murmur heard  No friction rub  No gallop  Pulmonary:      Effort: Pulmonary effort is normal  No respiratory distress  Breath sounds: Normal breath sounds  No stridor  No wheezing or rales  Chest:      Chest wall: No tenderness  Abdominal:      General: Bowel sounds are normal  There is no distension  Palpations: Abdomen is soft  There is no mass  Tenderness: There is no abdominal tenderness  There is no guarding or rebound  Musculoskeletal:         General: No tenderness or deformity  Normal range of motion  Cervical back: Normal range of motion  Lymphadenopathy:      Cervical: No cervical adenopathy  Skin:     General: Skin is warm and dry  Neurological:      Mental Status: She is alert and oriented to person, place, and time  Cranial Nerves: No cranial nerve deficit  Motor: No abnormal muscle tone  Coordination: Coordination normal       Deep Tendon Reflexes: Reflexes are normal and symmetric   Reflexes normal                 "

## 2023-05-02 NOTE — ASSESSMENT & PLAN NOTE
I believe that the patient's wrist pain and numbness is related to bilateral carpal tunnel syndrome which just started up recently  We will do a trial of the carpal tunnel wrist braces at night and Voltaren as well as stretching exercises  If no improvement with conservative treatment, we will refer the patient for an EMG and further follow-up  We will follow-up with her accordingly

## 2023-05-19 ENCOUNTER — OFFICE VISIT (OUTPATIENT)
Dept: URGENT CARE | Facility: CLINIC | Age: 43
End: 2023-05-19

## 2023-05-19 VITALS
HEART RATE: 100 BPM | RESPIRATION RATE: 20 BRPM | DIASTOLIC BLOOD PRESSURE: 92 MMHG | OXYGEN SATURATION: 97 % | SYSTOLIC BLOOD PRESSURE: 140 MMHG | TEMPERATURE: 98.8 F

## 2023-05-19 DIAGNOSIS — J02.9 PHARYNGITIS, UNSPECIFIED ETIOLOGY: Primary | ICD-10-CM

## 2023-05-19 LAB — S PYO AG THROAT QL: NEGATIVE

## 2023-05-19 RX ORDER — AMOXICILLIN 500 MG/1
500 TABLET, FILM COATED ORAL 2 TIMES DAILY
Qty: 20 TABLET | Refills: 0 | Status: SHIPPED | OUTPATIENT
Start: 2023-05-19 | End: 2023-05-29

## 2023-05-19 NOTE — PROGRESS NOTES
[facility]    NAME: Syd Spaulding is a 38 y o  female  : 1980    MRN: [id number]  DATE: May 19, 2023  TIME: 9:28 AM    Assessment and Plan   Pharyngitis, unspecified etiology [J02 9]  1  Pharyngitis, unspecified etiology  POCT rapid strepA    Throat culture    amoxicillin (AMOXIL) 500 MG tablet          Patient Instructions   Patient Instructions   Take antibiotic as instructed  Throat swab sent for culture as rapid strep in office negative  If throat culture is negative, you may stop antibiotic  Warm salt water gargles, throat lozenges, Chloraseptic spray, Tylenol and/or ibuprofen (if not contraindicated) per bottle instructions for comfort as needed  Warm tea with honey may also be soothing  Follow-up with primary care if not resolving over the next 7 to 10 days  May need more evaluation and can be done in the care in our office  If you would develop severe worsening of throat pain, hot potato voice, inability to swallow saliva to the point of drooling due to throat swelling please proceed immediately to emergency room for further evaluation  Chief Complaint     Chief Complaint   Patient presents with   • Sore Throat     Pt C/O sore throat, fatigue and chills that started Wednesday  History of Present Illness   Syd Spaulding presents to the clinic c/o  57-year-old female comes in with complaint of sore throat  Started: Wednesday  Had unusual chills on Wed  Allergies are really bad lately  Associated signs and symptoms: Fever, chills, no URI symptoms  Modifying factors: Allergy meds, chloroseptic, dayquil, nyquil  Known Exposures: Works as a teacher  Review of Systems   Review of Systems   Constitutional: Positive for activity change, appetite change, chills, diaphoresis, fatigue and fever  HENT: Positive for congestion, sore throat and trouble swallowing   Negative for ear discharge, ear pain, postnasal drip, rhinorrhea, sinus pressure, sinus "pain and voice change  Has had increased allergy symptoms the last few weeks  Sore throat new and not improving  Respiratory: Positive for cough  Negative for apnea, choking, chest tightness, shortness of breath, wheezing and stridor  Minimal cough   Cardiovascular: Negative  Skin: Negative for rash  Hematological: Positive for adenopathy  Current Medications     Long-Term Medications   Medication Sig Dispense Refill   • amLODIPine (NORVASC) 5 mg tablet TAKE 1 TABLET (5 MG TOTAL) BY MOUTH DAILY  90 tablet 2   • calcium carbonate (OS-MAUREEN) 600 MG tablet Take 600 mg by mouth daily     • Cetirizine HCl 10 MG CAPS Take 1 capsule by mouth daily as needed      • Diclofenac Sodium (VOLTAREN) 1 % Apply 2 g topically 4 (four) times a day 100 g 0   • montelukast (SINGULAIR) 10 mg tablet TAKE 1 TABLET BY MOUTH DAILY AT BEDTIME 90 tablet 1   • rosuvastatin (CRESTOR) 10 MG tablet TAKE 1 TABLET BY MOUTH EVERY DAY 90 tablet 2   • sertraline (Zoloft) 50 mg tablet Take 1 tablet (50 mg total) by mouth daily 90 tablet 3   • Tuberculin-Allergy Syringes (ALLERGY SYRINGE 1CC/27GX1/2\") 27G X 1/2\" 1 ML MISC by Subcutaneous (multi inj) route once a week 1 box of 100 ct 100 each 1   • EPINEPHrine (EPIPEN) 0 3 mg/0 3 mL SOAJ Inject 0 3 mL (0 3 mg total) into a muscle once for 1 dose (Patient not taking: Reported on 5/19/2023) 0 6 mL 0   • LORazepam (ATIVAN) 0 5 mg tablet Take 1 tablet (0 5 mg total) by mouth every 6 (six) hours as needed for anxiety 30 tablet 0   • meloxicam (MOBIC) 15 mg tablet TAKE 1 TABLET (15 MG TOTAL) BY MOUTH DAILY   30 tablet 0       Current Allergies     Allergies as of 05/19/2023 - Reviewed 05/19/2023   Allergen Reaction Noted   • Pollen extract Allergic Rhinitis 07/20/2021          The following portions of the patient's history were reviewed and updated as appropriate: allergies, current medications, past family history, past medical history, past social history, past surgical history " and problem list   Past Medical History:   Diagnosis Date   • Allergic rhinitis    • Anxiety    • Depression    • Family health problem     mother and grandmother h/o blood clots after surgery   • Fibroid    • Fullness in ear, left     Resolved [id number]   • Hypertension     Resolved [id number]   • Urinary tract infection     h/o     Past Surgical History:   Procedure Laterality Date   • CHOLECYSTECTOMY     • HYSTERECTOMY     • [place] TOTAL ABDOMINAL HYSTERECT W/WO RMVL TUBE OVARY N/A 6/18/2019    Procedure: Supracervical IWONA, removal of bilat tubes, removal of IUD, cystoscopy;  Surgeon: Damián Fagan MD;  Location: [facility];  Service: Gynecology   • TOOTH EXTRACTION       Family History   Problem Relation Age of Onset   • Anxiety disorder Mother    • Other Mother         Blood clots   • Hypertension Mother    • Hyperlipidemia Mother    • Alcohol abuse Father    • Hypertension Brother    • Anxiety disorder Maternal Grandmother    • Other Maternal Grandmother         Blood clots   • Lung cancer Maternal Grandfather    • No Known Problems Paternal Grandmother    • No Known Problems Paternal Grandfather    • Ovarian cancer Maternal Aunt 68   • Alcohol abuse Maternal Aunt 65   • Prostate cancer Maternal Uncle    • No Known Problems Paternal Aunt    • Heart disease Family         cardiac disorder   • Cancer Family         lung ca   • Diabetes Family    • Cancer Family        Objective   /92 (BP Location: Left arm, Patient Position: Sitting, Cuff Size: Large)   Pulse 100   Temp 98 8 °F (37 1 °C) (Tympanic)   Resp 20   LMP 05/15/2019 (Approximate)   SpO2 97%   Patient's last menstrual period was 05/15/2019 (approximate)  Physical Exam     Physical Exam  Vitals and nursing note reviewed  Constitutional:       General: She is not in acute distress  Appearance: She is well-developed  She is ill-appearing  She is not toxic-appearing or diaphoretic  Comments: Well-developed well-nourished    Appears mildly ill  No conversational dyspnea or trismus  HENT:      Head: Normocephalic and atraumatic  Right Ear: Tympanic membrane, ear canal and external ear normal       Left Ear: Tympanic membrane, ear canal and external ear normal       Nose: Nose normal  No congestion or rhinorrhea  Mouth/Throat:      Mouth: Mucous membranes are moist       Pharynx: Uvula midline  Pharyngeal swelling, oropharyngeal exudate, posterior oropharyngeal erythema and uvula swelling present  Tonsils: No tonsillar exudate or tonsillar abscesses  1+ on the right  1+ on the left  Comments: Petechiae on soft palate  Eyes:      General: No scleral icterus  Right eye: No discharge  Left eye: No discharge  Extraocular Movements: Extraocular movements intact  Conjunctiva/sclera: Conjunctivae normal       Pupils: Pupils are equal, round, and reactive to light  Cardiovascular:      Rate and Rhythm: Regular rhythm  Heart sounds: No murmur heard  No friction rub  No gallop  Pulmonary:      Effort: Pulmonary effort is normal  No respiratory distress  Breath sounds: Normal breath sounds  No stridor  No wheezing, rhonchi or rales  Musculoskeletal:      Cervical back: Normal range of motion and neck supple  Tenderness present  No rigidity  Lymphadenopathy:      Cervical: Cervical adenopathy present  Skin:     General: Skin is warm and dry  Findings: No erythema or rash  Neurological:      General: No focal deficit present  Mental Status: She is alert and oriented to person, place, and time     Psychiatric:         Mood and Affect: Mood normal          Behavior: Behavior normal

## 2023-05-19 NOTE — PATIENT INSTRUCTIONS
Take antibiotic as instructed  Throat swab sent for culture as rapid strep in office negative  If throat culture is negative, you may stop antibiotic  Warm salt water gargles, throat lozenges, Chloraseptic spray, Tylenol and/or ibuprofen (if not contraindicated) per bottle instructions for comfort as needed  Warm tea with honey may also be soothing  Follow-up with primary care if not resolving over the next 7 to 10 days  May need more evaluation and can be done in the care in our office  If you would develop severe worsening of throat pain, hot potato voice, inability to swallow saliva to the point of drooling due to throat swelling please proceed immediately to emergency room for further evaluation

## 2023-05-21 LAB — B-HEM STREP SPEC QL CULT: NORMAL

## 2023-05-22 LAB — B-HEM STREP SPEC QL CULT: NEGATIVE

## 2023-06-14 ENCOUNTER — PATIENT MESSAGE (OUTPATIENT)
Dept: FAMILY MEDICINE CLINIC | Facility: CLINIC | Age: 43
End: 2023-06-14

## 2023-06-14 DIAGNOSIS — G56.02 LEFT CARPAL TUNNEL SYNDROME: Primary | ICD-10-CM

## 2023-06-14 NOTE — LETTER
[address] [address]  Phone#  [phone]  Fax#  [phone]                 [place]  [address] [facility] [address]  Phone: [phone]  Fax: [phone] Date: 2023      Name: Sheyla Diaz [address]  [place] [id number] [phone]    MRN: [id number]  : 1980  SEX: F         [employer] # SUBSCRIBER ID   PrimaryReece Stands [id number]       [id number]   [id number]      Secondary:                              EMG 1 Limb       (Order ID: [id number])     Diagnosis:  Left carpal tunnel syndrome (G56 02 [ICD-10-CM])  Priority:  Routine      Scheduling Instructions: Call [facility] to schedule at [phone]    Location: Left upper  Reason for Exam: numbness left hand and wrist  Possible Diagnosis: median neuropathy (carpal tunnel syndrome)     Ordered by: Divine Barriga DO  Authorized by:  Divine Barriga DO   (NPI: [id number])

## 2023-06-25 DIAGNOSIS — Z00.00 ANNUAL PHYSICAL EXAM: ICD-10-CM

## 2023-06-25 RX ORDER — AMLODIPINE BESYLATE 5 MG/1
5 TABLET ORAL DAILY
Qty: 90 TABLET | Refills: 2 | Status: SHIPPED | OUTPATIENT
Start: 2023-06-25

## 2023-07-12 ENCOUNTER — TELEPHONE (OUTPATIENT)
Dept: OBGYN CLINIC | Facility: HOSPITAL | Age: 43
End: 2023-07-12

## 2023-07-12 DIAGNOSIS — M17.11 PRIMARY OSTEOARTHRITIS OF RIGHT KNEE: ICD-10-CM

## 2023-07-12 DIAGNOSIS — M25.561 ACUTE PAIN OF RIGHT KNEE: Primary | ICD-10-CM

## 2023-07-12 DIAGNOSIS — M22.2X1 PATELLOFEMORAL DISORDER OF RIGHT KNEE: ICD-10-CM

## 2023-07-12 NOTE — TELEPHONE ENCOUNTER
Caller: Patient  Doctor/office: Palak Villarreal   #: [phone]       Patient called to start auth/delivery for VISCO/EUFLEXXA/Durolane injections    Body Part: left knee  Pain Level (scale 1-10): 7   Date of last Gel Injection: N/A     Educated patient on Visco procedure.  Medications must first be authorized and delivered to our office BEFORE we can schedule

## 2023-08-09 ENCOUNTER — OFFICE VISIT (OUTPATIENT)
Dept: OBGYN CLINIC | Facility: CLINIC | Age: 43
End: 2023-08-09
Payer: COMMERCIAL

## 2023-08-09 VITALS — BODY MASS INDEX: 44.41 KG/M2 | WEIGHT: 293 LBS | HEIGHT: 68 IN

## 2023-08-09 DIAGNOSIS — M65.4 DE QUERVAIN'S TENOSYNOVITIS: Primary | ICD-10-CM

## 2023-08-09 PROCEDURE — 99214 OFFICE O/P EST MOD 30 MIN: CPT | Performed by: ORTHOPAEDIC SURGERY

## 2023-08-09 RX ORDER — ACETAMINOPHEN 500 MG
500 TABLET ORAL EVERY 6 HOURS PRN
COMMUNITY

## 2023-08-09 NOTE — PROGRESS NOTES
[address]  [address]  [address] [address] [phone]       Carlton Mendez  [id number]  1980    ORTHOPAEDIC SURGERY OUTPATIENT NOTE  8/9/2023      HISTORY:  37 y.o. female wrist pain. She reports pain over the radial aspect of the wrist and the base of the thumb for several months. She denies fall or trauma. She is right-hand dominant. She does report intermittent tingling in bilateral index fingers and thumbs. She saw her family doctor and was referred for an EMG which showed very mild minimal carpal tunnel/median neuropathy. She has pain with gripping and grasping of the left hand. She denies nighttime symptoms in the hands. She says the right side symptoms are very mild.     Past Medical History:   Diagnosis Date   • Allergic rhinitis    • Anxiety    • Depression    • Family health problem     mother and grandmother h/o blood clots after surgery   • Fibroid    • Fullness in ear, left     Resolved [id number]   • Hypertension     Resolved [id number]   • Sleep apnea    • Urinary tract infection     h/o       Past Surgical History:   Procedure Laterality Date   • CHOLECYSTECTOMY     • HYSTERECTOMY     • SC TOTAL ABDOMINAL HYSTERECT W/WO RMVL TUBE OVARY N/A 6/18/2019    Procedure: Supracervical IWONA, removal of bilat tubes, removal of IUD, cystoscopy;  Surgeon: Jean-Paul Tena MD;  Location: [facility];  Service: Gynecology   • TOOTH EXTRACTION         Social History     Socioeconomic History   • Marital status: /Civil Union     Spouse name: Not on file   • Number of children: Not on file   • Years of education: Not on file   • Highest education level: Not on file   Occupational History   • Not on file   Tobacco Use   • Smoking status: Never   • Smokeless tobacco: Never   Vaping Use   • Vaping Use: Never used   Substance and Sexual Activity   • Alcohol use: Yes     Comment: rare   • Drug use: No   • Sexual activity: Yes Partners: Male     Birth control/protection: Female Sterilization   Other Topics Concern   • Not on file   Social History Narrative    Always uses seat belt    Caffeine use    Mosque Sikhism ([employer])     Social Determinants of Health     Financial Resource Strain: Low Risk  (1/18/2021)    Overall Financial Resource Strain (CARDIA)    • Difficulty of Paying Living Expenses: Not hard at all   Food Insecurity: No Food Insecurity (1/18/2021)    Hunger Vital Sign    • Worried About Running Out of Food in the Last Year: Never true    • Ran Out of Food in the Last Year: Never true   Transportation Needs: No Transportation Needs (1/18/2021)    PRAPARE - Transportation    • Lack of Transportation (Medical): No    • Lack of Transportation (Non-Medical): No   Physical Activity: Inactive (9/7/2022)    Exercise Vital Sign    • Days of Exercise per Week: 0 days    • Minutes of Exercise per Session: 0 min   Stress: No Stress Concern Present (9/7/2022)    [facility]    • Feeling of Stress : Only a little   Social Connections: Socially Integrated (4/20/2021)    Social Connection and Isolation Panel [NHANES]    • Frequency of Communication with Friends and Family: More than three times a week    • Frequency of Social Gatherings with Friends and Family: Once a week    • Attends Yarsani Services: More than 4 times per year    • Active Member of Clubs or Organizations:  Yes    • Attends Club or Organization Meetings: More than 4 times per year    • Marital Status:    Intimate Partner Violence: Not At Risk (10/18/2022)    Humiliation, Afraid, Rape, and Kick questionnaire    • Fear of Current or Ex-Partner: No    • Emotionally Abused: No    • Physically Abused: No    • Sexually Abused: No   Housing Stability: Not on file       Family History   Problem Relation Age of Onset   • Anxiety disorder Mother    • Other Mother         Blood clots   • Hypertension Mother    • Hyperlipidemia Mother    • Alcohol abuse Father    • Hypertension Brother    • Anxiety disorder Maternal Grandmother    • Other Maternal Grandmother         Blood clots   • Lung cancer Maternal Grandfather    • No Known Problems Paternal Grandmother    • No Known Problems Paternal Grandfather    • Ovarian cancer Maternal Aunt 68   • Alcohol abuse Maternal Aunt 72   • Prostate cancer Maternal Uncle    • No Known Problems Paternal Aunt    • Heart disease Family         cardiac disorder   • Cancer Family         lung ca   • Diabetes Family    • Cancer Family         Patient's Medications   New Prescriptions    No medications on file   Previous Medications    ACETAMINOPHEN (TYLENOL) 500 MG TABLET    Take 500 mg by mouth every 6 (six) hours as needed for mild pain As needed    AMLODIPINE (NORVASC) 5 MG TABLET    TAKE 1 TABLET (5 MG TOTAL) BY MOUTH DAILY. AZELASTINE  MCG/SPRAY SOLN    1-2 sprays twice a day    CALCIUM CARBONATE (OS-MAUREEN) 600 MG TABLET    Take 600 mg by mouth daily    CETIRIZINE HCL 10 MG CAPS    Take 1 capsule by mouth daily as needed     DICLOFENAC SODIUM (VOLTAREN) 1 %    Apply 2 g topically 4 (four) times a day    ELASTIC BANDAGES & SUPPORTS (B & B CARPAL TUNNEL BRACE) MISC    Use daily Bilateral carpal tunnel wrist braces. EPINEPHRINE (EPIPEN) 0.3 MG/0.3 ML SOAJ    Inject 0.3 mL (0.3 mg total) into a muscle once for 1 dose    IBUPROFEN (MOTRIN) 400 MG TABLET    Take 400 mg by mouth if needed    LORAZEPAM (ATIVAN) 0.5 MG TABLET    Take 1 tablet (0.5 mg total) by mouth every 6 (six) hours as needed for anxiety    MELOXICAM (MOBIC) 15 MG TABLET    TAKE 1 TABLET (15 MG TOTAL) BY MOUTH DAILY.     MONTELUKAST (SINGULAIR) 10 MG TABLET    Take 1 tablet (10 mg total) by mouth daily at bedtime    MULTIPLE VITAMIN (MULTIVITAMIN PO)    Take by mouth in the morning    ROSUVASTATIN (CRESTOR) 10 MG TABLET    TAKE 1 TABLET BY MOUTH EVERY DAY    SERTRALINE (ZOLOFT) 50 MG TABLET Take 1 tablet (50 mg total) by mouth daily    TUBERCULIN-ALLERGY SYRINGES (ALLERGY SYRINGE 1CC/27GX1/2") 27G X 1/2" 1 ML MISC    by Subcutaneous (multi inj) route once a week 1 box of 100 ct   Modified Medications    No medications on file   Discontinued Medications    No medications on file       Allergies   Allergen Reactions   • Pollen Extract Allergic Rhinitis        Ht 5' 8" (1.727 m)   Wt (!) 142 kg (313 lb)   LMP 05/15/2019 (Approximate)   BMI 47.59 kg/m²      REVIEW OF SYSTEMS:  Constitutional: Negative. HEENT: Negative. Respiratory: Negative. Skin: Negative. Neurological: Negative. Psychiatric/Behavioral: Negative. Musculoskeletal: Negative except for that mentioned in the HPI. Ht 5' 8" (1.727 m)   Wt (!) 142 kg (313 lb)   LMP 05/15/2019 (Approximate)   BMI 47.59 kg/m²   Gen: No acute distress, resting comfortably in bed  HEENT: Eyes clear, moist mucus membranes, hearing intact  Respiratory: No audible wheezing or stridor  Cardiovascular: Well Perfused peripherally, 2+ distal pulse  Abdomen: nondistended, no peritoneal signs     PHYSICAL EXAM:    Right hand:  Negative Tinel's  Negative Durkan's  Negative Phalen's  Median, radial, ulnar nerve sensation intact  No thenar atrophy    Left hand  Negative Tinel's  Positive Durkan's  Negative Phalen's  Positive Finkelstein's  Tender to palpation at the first dorsal compartment  Sensation intact in median, radial, ulnar nerves  No thenar atrophy  Negative axial grind CMC    IMAGING: No new imaging    ASSESSMENT AND PLAN:  37 y.o. female with left hand de Quervain's tenosynovitis. Discussed with the patient conservative management with a thumb spica brace and Voltaren gel. She will wear the brace when she is active. We did discuss injection. Her carpal tunnel symptoms are mild and intermittent and most of her pain is coming from the first dorsal compartment. Her EMG was not very significant for carpal tunnel/median neuropathy.   Will see her back in 6 weeks if she has not improved.     Scribe Attestation      I,:  Oli Holden PA-C am acting as a scribe while in the presence of the attending physician.:       I,:  Lawson Lin personally performed the services described in this documentation    as scribed in my presence.:

## 2023-08-11 ENCOUNTER — PROCEDURE VISIT (OUTPATIENT)
Dept: OBGYN CLINIC | Facility: MEDICAL CENTER | Age: 43
End: 2023-08-11

## 2023-08-11 VITALS — HEIGHT: 68 IN | WEIGHT: 293 LBS | BODY MASS INDEX: 44.41 KG/M2

## 2023-08-11 DIAGNOSIS — G89.29 CHRONIC PAIN OF LEFT KNEE: Primary | ICD-10-CM

## 2023-08-11 DIAGNOSIS — M17.12 PRIMARY OSTEOARTHRITIS OF LEFT KNEE: ICD-10-CM

## 2023-08-11 DIAGNOSIS — M25.562 CHRONIC PAIN OF LEFT KNEE: Primary | ICD-10-CM

## 2023-08-11 NOTE — PROGRESS NOTES
Assessment/Plan:    Diagnoses and all orders for this visit:    Chronic pain of left knee  -     Large joint arthrocentesis: L knee    Primary osteoarthritis of left knee  -     Large joint arthrocentesis: L knee      Patient presents for viscosupplementation of the left knee  No follow-ups on file. Subjective:   Patient ID: Jose Angel Posey is a 37 y.o. female. HPI  Last visit 10/2022  Patient had called since last evaluation to have viscosupplementation injections of the left knee  Review of Systems    The following portions of the patient's chart were reviewed and updated as appropriate: Allergy:    Allergies   Allergen Reactions   • Pollen Extract Allergic Rhinitis       Medications:    Current Outpatient Medications:   •  acetaminophen (TYLENOL) 500 mg tablet, Take 500 mg by mouth every 6 (six) hours as needed for mild pain As needed, Disp: , Rfl:   •  amLODIPine (NORVASC) 5 mg tablet, TAKE 1 TABLET (5 MG TOTAL) BY MOUTH DAILY. , Disp: 90 tablet, Rfl: 2  •  Azelastine HCl 137 MCG/SPRAY SOLN, 1-2 sprays twice a day, Disp: 90 mL, Rfl: 3  •  calcium carbonate (OS-MAUREEN) 600 MG tablet, Take 600 mg by mouth daily, Disp: , Rfl:   •  Cetirizine HCl 10 MG CAPS, Take 1 capsule by mouth daily as needed , Disp: , Rfl:   •  Diclofenac Sodium (VOLTAREN) 1 %, Apply 2 g topically 4 (four) times a day, Disp: 100 g, Rfl: 1  •  Elastic Bandages & Supports (B & B Carpal Tunnel Brace) MISC, Use daily Bilateral carpal tunnel wrist braces. , Disp: 2 each, Rfl: 0  •  ibuprofen (MOTRIN) 400 mg tablet, Take 400 mg by mouth if needed, Disp: , Rfl:   •  LORazepam (ATIVAN) 0.5 mg tablet, Take 1 tablet (0.5 mg total) by mouth every 6 (six) hours as needed for anxiety (Patient taking differently: Take 0.5 mg by mouth if needed for anxiety), Disp: 30 tablet, Rfl: 0  •  montelukast (SINGULAIR) 10 mg tablet, Take 1 tablet (10 mg total) by mouth daily at bedtime, Disp: 90 tablet, Rfl: 3  •  Multiple Vitamin (MULTIVITAMIN PO), Take by mouth in the morning, Disp: , Rfl:   •  rosuvastatin (CRESTOR) 10 MG tablet, TAKE 1 TABLET BY MOUTH EVERY DAY, Disp: 90 tablet, Rfl: 2  •  sertraline (Zoloft) 50 mg tablet, Take 1 tablet (50 mg total) by mouth daily, Disp: 90 tablet, Rfl: 3  •  Tuberculin-Allergy Syringes (ALLERGY SYRINGE 1CC/27GX1/2") 27G X 1/2" 1 ML MISC, by Subcutaneous (multi inj) route once a week 1 box of 100 ct, Disp: 100 each, Rfl: 1  •  EPINEPHrine (EPIPEN) 0.3 mg/0.3 mL SOAJ, Inject 0.3 mL (0.3 mg total) into a muscle once for 1 dose, Disp: 0.6 mL, Rfl: 0  •  meloxicam (MOBIC) 15 mg tablet, TAKE 1 TABLET (15 MG TOTAL) BY MOUTH DAILY. , Disp: 30 tablet, Rfl: 0    Patient Active Problem List   Diagnosis   • Depression, recurrent ([address])   • Generalized anxiety disorder   • S/P abdominal supracervical subtotal hysterectomy   • Allergic rhinitis   • Disturbance in sleep behavior   • Class 3 severe obesity due to excess calories without serious comorbidity with body mass index (BMI) of 45.0 to 49.9 in adult [facility])   • Muscle strain of left thigh   • Obstructive sleep apnea treated with continuous positive airway pressure (CPAP)   • Primary sleep disturbance   • Excessive daytime sleepiness   • Nocturia   • Primary hypertension   • Snoring   • Prediabetes   • Mixed hyperlipidemia   • Bilateral carpal tunnel syndrome   • De Quervain's tenosynovitis       Objective:  Ht 5' 8" (1.727 m)   Wt (!) 142 kg (313 lb)   LMP 05/15/2019 (Approximate)   BMI 47.59 kg/m²     Left Knee Exam     Other   Erythema: absent            Physical Exam      Neurologic Exam    Large joint arthrocentesis: L knee  Universal Protocol:  Consent: Verbal consent obtained. Risks and benefits: risks, benefits and alternatives were discussed  Consent given by: patient  Time out: Immediately prior to procedure a "time out" was called to verify the correct patient, procedure, equipment, support staff and site/side marked as required.   Timeout called at: 8/11/2023 3:06 PM.  Patient understanding: patient states understanding of the procedure being performed  Test results: test results available and properly labeled  Site marked: the operative site was marked  Patient identity confirmed: verbally with patient    Supporting Documentation  Indications: pain   Procedure Details  Location: knee - L knee  Preparation: Patient was prepped and draped in the usual sterile fashion  Needle size: 22 G  Ultrasound guidance: no  Approach: anterolateral    Patient tolerance: patient tolerated the procedure well with no immediate complications  Dressing:  Sterile dressing applied    No erythema of knees          I have personally reviewed the written report of the pertinent studies.              Past Medical History:   Diagnosis Date   • Allergic rhinitis    • Anxiety    • Depression    • Family health problem     mother and grandmother h/o blood clots after surgery   • Fibroid    • Fullness in ear, left     Resolved [id number]   • Hypertension     Resolved [id number]   • Sleep apnea    • Urinary tract infection     h/o       Past Surgical History:   Procedure Laterality Date   • CHOLECYSTECTOMY     • HYSTERECTOMY     • [place] TOTAL ABDOMINAL HYSTERECT W/WO RMVL TUBE OVARY N/A 6/18/2019    Procedure: Supracervical IWONA, removal of bilat tubes, removal of IUD, cystoscopy;  Surgeon: Slava Helton MD;  Location: [facility];  Service: Gynecology   • TOOTH EXTRACTION         Social History     Socioeconomic History   • Marital status: /Civil Union     Spouse name: Not on file   • Number of children: Not on file   • Years of education: Not on file   • Highest education level: Not on file   Occupational History   • Not on file   Tobacco Use   • Smoking status: Never   • Smokeless tobacco: Never   Vaping Use   • Vaping Use: Never used   Substance and Sexual Activity   • Alcohol use: Yes     Comment: rare   • Drug use: No   • Sexual activity: Yes     Partners: Male     Birth control/protection: Female Sterilization Other Topics Concern   • Not on file   Social History Narrative    Always uses seat belt    Caffeine use    Quaker Yazidism ([employer])     Social Determinants of Health     Financial Resource Strain: Low Risk  (1/18/2021)    Overall Financial Resource Strain (CARDIA)    • Difficulty of Paying Living Expenses: Not hard at all   Food Insecurity: No Food Insecurity (1/18/2021)    Hunger Vital Sign    • Worried About Running Out of Food in the Last Year: Never true    • Ran Out of Food in the Last Year: Never true   Transportation Needs: No Transportation Needs (1/18/2021)    PRAPARE - Transportation    • Lack of Transportation (Medical): No    • Lack of Transportation (Non-Medical): No   Physical Activity: Inactive (9/7/2022)    Exercise Vital Sign    • Days of Exercise per Week: 0 days    • Minutes of Exercise per Session: 0 min   Stress: No Stress Concern Present (9/7/2022)    [facility]    • Feeling of Stress : Only a little   Social Connections: Socially Integrated (4/20/2021)    Social Connection and Isolation Panel [NHANES]    • Frequency of Communication with Friends and Family: More than three times a week    • Frequency of Social Gatherings with Friends and Family: Once a week    • Attends Druze Services: More than 4 times per year    • Active Member of Clubs or Organizations:  Yes    • Attends Club or Organization Meetings: More than 4 times per year    • Marital Status:    Intimate Partner Violence: Not At Risk (10/18/2022)    Humiliation, Afraid, Rape, and Kick questionnaire    • Fear of Current or Ex-Partner: No    • Emotionally Abused: No    • Physically Abused: No    • Sexually Abused: No   Housing Stability: Not on file       Family History   Problem Relation Age of Onset   • Anxiety disorder Mother    • Other Mother         Blood clots   • Hypertension Mother    • Hyperlipidemia Mother    • Alcohol abuse Father • Hypertension Brother    • Anxiety disorder Maternal Grandmother    • Other Maternal Grandmother         Blood clots   • Lung cancer Maternal Grandfather    • No Known Problems Paternal Grandmother    • No Known Problems Paternal Grandfather    • Ovarian cancer Maternal Aunt 68   • Alcohol abuse Maternal Aunt 72   • Prostate cancer Maternal Uncle    • No Known Problems Paternal Aunt    • Heart disease Family         cardiac disorder   • Cancer Family         lung ca   • Diabetes Family    • Cancer Family

## 2023-08-14 ENCOUNTER — ANNUAL EXAM (OUTPATIENT)
Dept: GYNECOLOGY | Facility: CLINIC | Age: 43
End: 2023-08-14
Payer: COMMERCIAL

## 2023-08-14 VITALS
BODY MASS INDEX: 44.41 KG/M2 | SYSTOLIC BLOOD PRESSURE: 124 MMHG | DIASTOLIC BLOOD PRESSURE: 72 MMHG | WEIGHT: 293 LBS | HEIGHT: 68 IN

## 2023-08-14 DIAGNOSIS — F41.1 GENERALIZED ANXIETY DISORDER: ICD-10-CM

## 2023-08-14 DIAGNOSIS — E78.2 MIXED HYPERLIPIDEMIA: ICD-10-CM

## 2023-08-14 DIAGNOSIS — F41.9 ANXIETY AND DEPRESSION: ICD-10-CM

## 2023-08-14 DIAGNOSIS — E66.01 CLASS 3 SEVERE OBESITY DUE TO EXCESS CALORIES WITHOUT SERIOUS COMORBIDITY WITH BODY MASS INDEX (BMI) OF 45.0 TO 49.9 IN ADULT (HCC): ICD-10-CM

## 2023-08-14 DIAGNOSIS — Z12.31 VISIT FOR SCREENING MAMMOGRAM: ICD-10-CM

## 2023-08-14 DIAGNOSIS — I10 ESSENTIAL HYPERTENSION: ICD-10-CM

## 2023-08-14 DIAGNOSIS — Z01.419 WOMEN'S ANNUAL ROUTINE GYNECOLOGICAL EXAMINATION: ICD-10-CM

## 2023-08-14 DIAGNOSIS — Z90.711 S/P ABDOMINAL SUPRACERVICAL SUBTOTAL HYSTERECTOMY: ICD-10-CM

## 2023-08-14 DIAGNOSIS — F32.A ANXIETY AND DEPRESSION: ICD-10-CM

## 2023-08-14 PROCEDURE — S0612 ANNUAL GYNECOLOGICAL EXAMINA: HCPCS | Performed by: OBSTETRICS & GYNECOLOGY

## 2023-08-14 NOTE — PROGRESS NOTES
Assessment   Annual well woman exam  Status post subtotal supracervical hysterectomy for uterine fibroids  Morbid obesity BMI 49  Mixed hyperlipidemia  Essential hypertension  History anxiety       Plan   Screening mammography ordered  Reviewed self breast exam techniques  Normal Pap in , next Pap due in    All questions answered. Subjective here for annual exam     Shady Quinones is a 37 y.o. female nulliparous who presents for annual exam.  Status post abdominal supracervical hysterectomy several years ago for uterine fibroids and abnormal bleeding symptoms. Denies any pelvic pain symptoms. No pain with sex denies any discharge odor or itching or bleeding. The patient reports that there is not domestic violence in her life. Current contraception: status post hysterectomy  History of abnormal Pap smear: no  Family history of uterine or ovarian cancer: yes -maternal aunt  Regular self breast exam: yes  History of abnormal mammogram: no  Family history of breast cancer: yes -mother  History of abnormal lipids: yes -hyperlipidemia  Menstrual History:  OB History        0    Para   0    Term   0       0    AB   0    Living   0       SAB   0    IAB   0    Ectopic   0    Multiple   0    Live Births   0                Menarche age: 13  Patient's last menstrual period was 05/15/2019 (approximate). Review of Systems  Pertinent items are noted in HPI.       Objective no acute distress   /72   Ht 5' 7.5" (1.715 m)   Wt (!) 145 kg (318 lb 9.6 oz)   LMP 05/15/2019 (Approximate)   BMI 49.16 kg/m²     General:   alert and oriented, in no acute distress, alert, appears stated age and cooperative   Heart: regular rate and rhythm, S1, S2 normal, no murmur, click, rub or gallop   Lungs: clear to auscultation bilaterally   Abdomen: soft, non-tender, without masses or organomegaly   Vulva: normal, Bartholin's, Urethra, Avenel's normal, female escutcheon   Vagina: normal mucosa, normal discharge, no palpable nodules   Cervix: anteverted, no cervical motion tenderness, no lesions and nulliparous appearance   Uterus: surgically absent   Adnexa: normal adnexa and no mass, fullness, tenderness   Bilateral breast exam in the sitting and supine position with chaperone present, no visible or palpable breast lesions identified. No breast masses noted. No supraclavicular or axillary lymphadenopathy noted. No nipple discharge. Reviewed self-breast exam techniques.    Rectal exam,  deferred

## 2023-08-15 DIAGNOSIS — Z12.11 COLON CANCER SCREENING: Primary | ICD-10-CM

## 2023-09-02 LAB
ALBUMIN SERPL-MCNC: 4.4 G/DL (ref 3.9–4.9)
ALBUMIN/GLOB SERPL: 1.8 {RATIO} (ref 1.2–2.2)
ALP SERPL-CCNC: 85 IU/L (ref 44–121)
ALT SERPL-CCNC: 15 IU/L (ref 0–32)
AST SERPL-CCNC: 15 IU/L (ref 0–40)
BILIRUB SERPL-MCNC: 0.3 MG/DL (ref 0–1.2)
BUN SERPL-MCNC: 8 MG/DL (ref 6–24)
BUN/CREAT SERPL: 13 (ref 9–23)
CALCIUM SERPL-MCNC: 9.2 MG/DL (ref 8.7–10.2)
CHLORIDE SERPL-SCNC: 103 MMOL/L (ref 96–106)
CHOLEST SERPL-MCNC: 165 MG/DL (ref 100–199)
CHOLEST/HDLC SERPL: 3.4 RATIO (ref 0–4.4)
CO2 SERPL-SCNC: 21 MMOL/L (ref 20–29)
CREAT SERPL-MCNC: 0.6 MG/DL (ref 0.57–1)
EGFR: 113 ML/MIN/1.73
EST. AVERAGE GLUCOSE BLD GHB EST-MCNC: 134 MG/DL
GLOBULIN SER-MCNC: 2.5 G/DL (ref 1.5–4.5)
GLUCOSE SERPL-MCNC: 121 MG/DL (ref 70–99)
HBA1C MFR BLD: 6.3 % (ref 4.8–5.6)
HDLC SERPL-MCNC: 48 MG/DL
LDLC SERPL CALC-MCNC: 81 MG/DL (ref 0–99)
LDLC SERPL DIRECT ASSAY-MCNC: 81 MG/DL (ref 0–99)
POTASSIUM SERPL-SCNC: 4.4 MMOL/L (ref 3.5–5.2)
PROT SERPL-MCNC: 6.9 G/DL (ref 6–8.5)
SL AMB VLDL CHOLESTEROL CALC: 36 MG/DL (ref 5–40)
SODIUM SERPL-SCNC: 141 MMOL/L (ref 134–144)
TRIGL SERPL-MCNC: 214 MG/DL (ref 0–149)

## 2023-09-08 ENCOUNTER — OFFICE VISIT (OUTPATIENT)
Dept: FAMILY MEDICINE CLINIC | Facility: CLINIC | Age: 43
End: 2023-09-08
Payer: COMMERCIAL

## 2023-09-08 VITALS
WEIGHT: 293 LBS | HEIGHT: 68 IN | HEART RATE: 89 BPM | BODY MASS INDEX: 44.41 KG/M2 | TEMPERATURE: 97.8 F | DIASTOLIC BLOOD PRESSURE: 96 MMHG | OXYGEN SATURATION: 98 % | RESPIRATION RATE: 14 BRPM | SYSTOLIC BLOOD PRESSURE: 142 MMHG

## 2023-09-08 DIAGNOSIS — R73.03 PREDIABETES: Primary | ICD-10-CM

## 2023-09-08 DIAGNOSIS — F41.1 GENERALIZED ANXIETY DISORDER: ICD-10-CM

## 2023-09-08 DIAGNOSIS — I10 PRIMARY HYPERTENSION: ICD-10-CM

## 2023-09-08 DIAGNOSIS — E78.2 MIXED HYPERLIPIDEMIA: ICD-10-CM

## 2023-09-08 PROCEDURE — 99214 OFFICE O/P EST MOD 30 MIN: CPT | Performed by: FAMILY MEDICINE

## 2023-09-08 RX ORDER — LORAZEPAM 0.5 MG/1
0.5 TABLET ORAL EVERY 6 HOURS PRN
Qty: 30 TABLET | Refills: 0 | Status: SHIPPED | OUTPATIENT
Start: 2023-09-08

## 2023-09-08 RX ORDER — METFORMIN HYDROCHLORIDE 500 MG/1
500 TABLET, EXTENDED RELEASE ORAL
Qty: 30 TABLET | Refills: 5 | Status: SHIPPED | OUTPATIENT
Start: 2023-09-08

## 2023-09-08 NOTE — PROGRESS NOTES
Assessment/Plan:  Problem List Items Addressed This Visit        Cardiovascular and Mediastinum    Primary hypertension     The patient's blood pressure was mildly elevated in the office today but she has been having normal readings at home. We will maintain her on the current dose of her medication and she will continue work on diet and exercise. She will monitor her blood pressure at home and will call us with readings consistently greater than 140/90. Other    Generalized anxiety disorder     The patient's anxiety is currently stable. We have given her small supply of lorazepam to use as needed for breakthrough panic attacks which she will use very sparingly. She will continue with her current medications. We will see her back as scheduled. Relevant Medications    LORazepam (ATIVAN) 0.5 mg tablet    Mixed hyperlipidemia     The patient's lipids have improved significantly with the rosuvastatin. We will continue her on this current dose along with diet and exercise. We will see her back in the office as scheduled. Prediabetes - Primary     The patient does have evidence of prediabetes on her current lab work. Her blood sugar and hemoglobin A1c have not improved despite diet and exercise. We will start her on the once daily metformin as indicated which may also help with weight loss. We will monitor her closely. We will see her back as scheduled and she will get a CMP and hemoglobin A1c prior to that visit. Relevant Medications    metFORMIN (GLUCOPHAGE-XR) 500 mg 24 hr tablet    Other Relevant Orders    Comprehensive metabolic panel    Hemoglobin A1C       Return in about 3 months (around 12/8/2023) for Recheck. I spent 15 minutes during the visit reviewing the history from the patient, performing the examination, discussing the findings with the patient, providing counseling and education, and making a plan.  I spent 15 minutes ordering referrals and testing and documenting. Subjective:   Chief Complaint   Patient presents with   • Follow-up     3 month        Patient ID: Freida Zuniga is a 37 y.o. female presents today for a routine follow up  Freida Zuniga is a 37 y.o. female who presents today for follow-up of her hypertension, hyperlipidemia, depression, and anxiety. She is here to review her recent lab work. She is feeling well overall. She is trying to do a better job with her diet and exercise. The patient denies any chest pain, shortness of breath, or palpitations. There is no edema. There are no headaches or visual changes. There is no lightheadedness, dizziness, or fainting spells. The patient currently denies any nausea, vomiting, or GERD symptoms. she has normal bowel movements and normal urine output. she has a normal appetite. She is doing well on the current dose of her sertraline. She is not as anxious, there is no depression- there is decreased ambition. There are no panic attacks. Hypertension  This is a chronic problem. The current episode started more than 1 year ago. The problem is unchanged. The problem is controlled. Pertinent negatives include no anxiety, blurred vision, chest pain, headaches, malaise/fatigue, neck pain, orthopnea, palpitations, peripheral edema, PND, shortness of breath or sweats.      The following portions of the patient's history were reviewed and updated as appropriate: allergies, current medications, past family history, past medical history, past social history, past surgical history and problem list.  Patient Active Problem List   Diagnosis   • Depression, recurrent ([address])   • Generalized anxiety disorder   • S/P abdominal supracervical subtotal hysterectomy   • Allergic rhinitis   • Disturbance in sleep behavior   • Class 3 severe obesity due to excess calories without serious comorbidity with body mass index (BMI) of 45.0 to 49.9 in adult [facility])   • Muscle strain of left thigh   • Obstructive sleep apnea treated with continuous positive airway pressure (CPAP)   • Primary sleep disturbance   • Excessive daytime sleepiness   • Nocturia   • Primary hypertension   • Snoring   • Prediabetes   • Mixed hyperlipidemia   • Bilateral carpal tunnel syndrome   • De Quervain's tenosynovitis     Past Medical History:   Diagnosis Date   • Allergic rhinitis    • Anxiety    • Depression    • Family health problem     mother and grandmother h/o blood clots after surgery   • Fibroid    • Fullness in ear, left     Resolved [id number]   • Hypertension     Resolved [id number]   • Sleep apnea    • Urinary tract infection     h/o     Past Surgical History:   Procedure Laterality Date   • CHOLECYSTECTOMY     • HYSTERECTOMY     • HI TOTAL ABDOMINAL HYSTERECT W/WO RMVL TUBE OVARY N/A 6/18/2019    Procedure: Supracervical IWONA, removal of bilat tubes, removal of IUD, cystoscopy;  Surgeon: Irma Montano MD;  Location: [facility] OR;  Service: Gynecology   • TOOTH EXTRACTION       Allergies   Allergen Reactions   • Pollen Extract Allergic Rhinitis     Family History   Problem Relation Age of Onset   • Anxiety disorder Mother    • Other Mother         Blood clots   • Hypertension Mother    • Hyperlipidemia Mother    • Alcohol abuse Father    • Hypertension Brother    • Anxiety disorder Maternal Grandmother    • Other Maternal Grandmother         Blood clots   • Lung cancer Maternal Grandfather    • No Known Problems Paternal Grandmother    • No Known Problems Paternal Grandfather    • Ovarian cancer Maternal Aunt 68   • Alcohol abuse Maternal Aunt 65   • Prostate cancer Maternal Uncle    • No Known Problems Paternal Aunt    • Heart disease Family         cardiac disorder   • Cancer Family         lung ca   • Diabetes Family    • Cancer Family      Social History     Socioeconomic History   • Marital status: /Civil Union     Spouse name: Not on file   • Number of children: Not on file   • Years of education: Not on file   • Highest education level: Not on file   Occupational History   • Not on file   Tobacco Use   • Smoking status: Never     Passive exposure: Past   • Smokeless tobacco: Never   Vaping Use   • Vaping Use: Never used   Substance and Sexual Activity   • Alcohol use: Yes     Comment: rare   • Drug use: No   • Sexual activity: Yes     Partners: Male     Birth control/protection: Female Sterilization   Other Topics Concern   • Not on file   Social History Narrative    Always uses seat belt    Caffeine use    [employer] ([employer])     Social Determinants of Health     Financial Resource Strain: Low Risk  (1/18/2021)    Overall Financial Resource Strain (CARDIA)    • Difficulty of Paying Living Expenses: Not hard at all   Food Insecurity: No Food Insecurity (1/18/2021)    Hunger Vital Sign    • Worried About Running Out of Food in the Last Year: Never true    • Ran Out of Food in the Last Year: Never true   Transportation Needs: No Transportation Needs (1/18/2021)    PRAPARE - Transportation    • Lack of Transportation (Medical): No    • Lack of Transportation (Non-Medical): No   Physical Activity: Inactive (9/7/2022)    Exercise Vital Sign    • Days of Exercise per Week: 0 days    • Minutes of Exercise per Session: 0 min   Stress: No Stress Concern Present (9/7/2022)    [facility]    • Feeling of Stress : Only a little   Social Connections: Socially Integrated (4/20/2021)    Social Connection and Isolation Panel [NHANES]    • Frequency of Communication with Friends and Family: More than three times a week    • Frequency of Social Gatherings with Friends and Family: Once a week    • Attends L[employer] day Services: More than 4 times per year    • Active Member of Clubs or Organizations:  Yes    • Attends Club or Organization Meetings: More than 4 times per year    • Marital Status:    Intimate Partner Violence: Not At Risk (9/8/2023)    Humiliation, Afraid, Rape, and Kick questionnaire    • Fear of Current or Ex-Partner: No    • Emotionally Abused: No    • Physically Abused: No    • Sexually Abused: No   Housing Stability: Not on file     Current Outpatient Medications on File Prior to Visit   Medication Sig Dispense Refill   • acetaminophen (TYLENOL) 500 mg tablet Take 500 mg by mouth every 6 (six) hours as needed for mild pain As needed     • amLODIPine (NORVASC) 5 mg tablet TAKE 1 TABLET (5 MG TOTAL) BY MOUTH DAILY. 90 tablet 2   • Azelastine HCl 137 MCG/SPRAY SOLN 1-2 sprays twice a day 90 mL 3   • calcium carbonate (OS-MAUREEN) 600 MG tablet Take 600 mg by mouth daily     • Cetirizine HCl 10 MG CAPS Take 1 capsule by mouth daily as needed      • Diclofenac Sodium (VOLTAREN) 1 % Apply 2 g topically 4 (four) times a day 100 g 1   • Elastic Bandages & Supports (B & B Carpal Tunnel Brace) MISC Use daily Bilateral carpal tunnel wrist braces. 2 each 0   • EPINEPHrine (EPIPEN) 0.3 mg/0.3 mL SOAJ Inject 0.3 mL (0.3 mg total) into a muscle once for 1 dose 0.6 mL 0   • ibuprofen (MOTRIN) 400 mg tablet Take 400 mg by mouth if needed     • meloxicam (MOBIC) 15 mg tablet TAKE 1 TABLET (15 MG TOTAL) BY MOUTH DAILY. 30 tablet 0   • montelukast (SINGULAIR) 10 mg tablet Take 1 tablet (10 mg total) by mouth daily at bedtime 90 tablet 3   • Multiple Vitamin (MULTIVITAMIN PO) Take by mouth in the morning     • rosuvastatin (CRESTOR) 10 MG tablet TAKE 1 TABLET BY MOUTH EVERY DAY 90 tablet 2   • sertraline (Zoloft) 50 mg tablet Take 1 tablet (50 mg total) by mouth daily 90 tablet 3   • Tuberculin-Allergy Syringes (ALLERGY SYRINGE 1CC/27GX1/2") 27G X 1/2" 1 ML MISC by Subcutaneous (multi inj) route once a week 1 box of 100 ct 100 each 1     No current facility-administered medications on file prior to visit. Review of Systems   Constitutional: Negative. Negative for malaise/fatigue. HENT: Negative. Eyes: Negative. Negative for blurred vision. Respiratory: Negative.   Negative for shortness of breath. Cardiovascular: Negative. Negative for chest pain, palpitations, orthopnea and PND. Gastrointestinal: Negative. Endocrine: Negative. Genitourinary: Negative. Musculoskeletal: Negative. Negative for neck pain. Skin: Negative. Allergic/Immunologic: Negative. Neurological: Negative. Negative for headaches. Hematological: Negative. Psychiatric/Behavioral: Negative. Objective:  Vitals:    09/08/23 1650   BP: 142/96   BP Location: Left arm   Patient Position: Sitting   Cuff Size: Large   Pulse: 89   Resp: 14   Temp: 97.8 °F (36.6 °C)   SpO2: 98%   Weight: (!) 146 kg (322 lb 3.2 oz)   Height: 5' 7.5" (1.715 m)     Body mass index is 49.72 kg/m². Physical Exam  Constitutional:       Appearance: She is well-developed. HENT:      Head: Normocephalic and atraumatic. Mouth/Throat:      Pharynx: No oropharyngeal exudate. Eyes:      Conjunctiva/sclera: Conjunctivae normal.      Pupils: Pupils are equal, round, and reactive to light. Neck:      Thyroid: No thyromegaly. Vascular: No JVD. Trachea: No tracheal deviation. Cardiovascular:      Rate and Rhythm: Normal rate and regular rhythm. Heart sounds: Normal heart sounds. No murmur heard. No friction rub. No gallop. Pulmonary:      Effort: Pulmonary effort is normal. No respiratory distress. Breath sounds: Normal breath sounds. No stridor. No wheezing or rales. Chest:      Chest wall: No tenderness. Abdominal:      General: Bowel sounds are normal. There is no distension. Palpations: Abdomen is soft. There is no mass. Tenderness: There is no abdominal tenderness. There is no guarding or rebound. Musculoskeletal:         General: No tenderness or deformity. Normal range of motion. Cervical back: Normal range of motion. Lymphadenopathy:      Cervical: No cervical adenopathy. Skin:     General: Skin is warm and dry.    Neurological:      Mental Status: She is alert and oriented to person, place, and time. Cranial Nerves: No cranial nerve deficit. Motor: No abnormal muscle tone. Coordination: Coordination normal.      Deep Tendon Reflexes: Reflexes are normal and symmetric. Reflexes normal.            Recent Results (from the past 1008 hour(s))   Comprehensive metabolic panel    Collection Time: 09/01/23  8:33 AM   Result Value Ref Range    Glucose, Random 121 (H) 70 - 99 mg/dL    BUN 8 6 - 24 mg/dL    Creatinine 0.60 0.57 - 1.00 mg/dL    eGFR 113 >59 mL/min/1.73    SL AMB BUN/CREATININE RATIO 13 9 - 23    Sodium 141 134 - 144 mmol/L    Potassium 4.4 3.5 - 5.2 mmol/L    Chloride 103 96 - 106 mmol/L    CO2 21 20 - 29 mmol/L    CALCIUM 9.2 8.7 - 10.2 mg/dL    Protein, Total 6.9 6.0 - 8.5 g/dL    Albumin 4.4 3.9 - 4.9 g/dL    Globulin, Total 2.5 1.5 - 4.5 g/dL    Albumin/Globulin Ratio 1.8 1.2 - 2.2    TOTAL BILIRUBIN 0.3 0.0 - 1.2 mg/dL    Alk Phos Isoenzymes 85 44 - 121 IU/L    AST 15 0 - 40 IU/L    ALT 15 0 - 32 IU/L   LDL cholesterol, direct    Collection Time: 09/01/23  8:33 AM   Result Value Ref Range    LDL Direct 81 0 - 99 mg/dL   Lipid panel    Collection Time: 09/01/23  8:33 AM   Result Value Ref Range    Cholesterol, Total 165 100 - 199 mg/dL    Triglycerides 214 (H) 0 - 149 mg/dL    HDL 48 >39 mg/dL    VLDL Cholesterol Calculated 36 5 - 40 mg/dL    LDL Calculated 81 0 - 99 mg/dL    T.  Chol/HDL Ratio 3.4 0.0 - 4.4 ratio   Hemoglobin A1C    Collection Time: 09/01/23  8:33 AM   Result Value Ref Range    Hemoglobin A1C 6.3 (H) 4.8 - 5.6 %    Estimated Average Glucose 134 mg/dL

## 2023-09-13 NOTE — ASSESSMENT & PLAN NOTE
The patient's anxiety is currently stable. We have given her small supply of lorazepam to use as needed for breakthrough panic attacks which she will use very sparingly. She will continue with her current medications. We will see her back as scheduled.

## 2023-09-13 NOTE — ASSESSMENT & PLAN NOTE
The patient's blood pressure was mildly elevated in the office today but she has been having normal readings at home. We will maintain her on the current dose of her medication and she will continue work on diet and exercise. She will monitor her blood pressure at home and will call us with readings consistently greater than 140/90.

## 2023-09-13 NOTE — ASSESSMENT & PLAN NOTE
The patient's lipids have improved significantly with the rosuvastatin. We will continue her on this current dose along with diet and exercise. We will see her back in the office as scheduled.

## 2023-10-02 DIAGNOSIS — R73.03 PREDIABETES: ICD-10-CM

## 2023-10-02 RX ORDER — METFORMIN HYDROCHLORIDE 500 MG/1
500 TABLET, EXTENDED RELEASE ORAL
Qty: 90 TABLET | Refills: 2 | Status: SHIPPED | OUTPATIENT
Start: 2023-10-02

## 2023-11-06 DIAGNOSIS — E78.2 MIXED HYPERLIPIDEMIA: ICD-10-CM

## 2023-11-06 RX ORDER — ROSUVASTATIN CALCIUM 10 MG/1
TABLET, COATED ORAL
Qty: 90 TABLET | Refills: 2 | Status: SHIPPED | OUTPATIENT
Start: 2023-11-06

## 2023-12-11 ENCOUNTER — OFFICE VISIT (OUTPATIENT)
Dept: FAMILY MEDICINE CLINIC | Facility: CLINIC | Age: 43
End: 2023-12-11
Payer: COMMERCIAL

## 2023-12-11 VITALS
DIASTOLIC BLOOD PRESSURE: 84 MMHG | OXYGEN SATURATION: 98 % | WEIGHT: 293 LBS | HEIGHT: 68 IN | TEMPERATURE: 98.3 F | SYSTOLIC BLOOD PRESSURE: 132 MMHG | RESPIRATION RATE: 16 BRPM | BODY MASS INDEX: 44.41 KG/M2 | HEART RATE: 68 BPM

## 2023-12-11 DIAGNOSIS — R73.03 PREDIABETES: ICD-10-CM

## 2023-12-11 DIAGNOSIS — E66.01 CLASS 3 SEVERE OBESITY DUE TO EXCESS CALORIES WITHOUT SERIOUS COMORBIDITY WITH BODY MASS INDEX (BMI) OF 45.0 TO 49.9 IN ADULT (HCC): ICD-10-CM

## 2023-12-11 DIAGNOSIS — I10 PRIMARY HYPERTENSION: Primary | ICD-10-CM

## 2023-12-11 DIAGNOSIS — S63.502A LEFT WRIST SPRAIN, INITIAL ENCOUNTER: ICD-10-CM

## 2023-12-11 DIAGNOSIS — E78.2 MIXED HYPERLIPIDEMIA: ICD-10-CM

## 2023-12-11 PROCEDURE — 99214 OFFICE O/P EST MOD 30 MIN: CPT | Performed by: FAMILY MEDICINE

## 2023-12-11 NOTE — PROGRESS NOTES
Assessment/Plan:  Problem List Items Addressed This Visit        Cardiovascular and Mediastinum    Primary hypertension - Primary     The patient's blood pressure is well-controlled with her current medication. She will continue to work on her diet and exercise and losing weight. We will see her back in the office as scheduled. Musculoskeletal and Integument    Left wrist sprain, initial encounter     The patient suffered a mild sprain of her left wrist.  We will check the x-ray as a precaution. We will have her continue with alternate between ice and heat to the area and the Voltaren gel as needed. I advised her to give it a few more weeks. She will follow-up if there is no improvement or if the condition worsens. Relevant Orders    XR wrist 3+ vw left       Other    Class 3 severe obesity due to excess calories without serious comorbidity with body mass index (BMI) of 45.0 to 49.9 in [facility])     The patient will continue to work on her diet and exercise to lose weight. We will see her back in the office as scheduled. Mixed hyperlipidemia     The patient will continue on the current dose of her rosuvastatin. She will do this along with diet and exercise. We will see her back as scheduled. Prediabetes     The patient will continue with the current dose of the metformin. We have made no changes today. We will see her back as scheduled. Return in about 4 months (around 4/11/2024) for Recheck. I spent 15 minutes during the visit reviewing the history from the patient, performing the examination, discussing the findings with the patient, providing counseling and education, and making a plan. I spent 10 minutes ordering referrals and testing and documenting. Chief Complaint   Patient presents with   • Follow-up     3 month, not able to have labs done but will go during [place] break.  Left wrist hurts because accouple of weeks ago hurt it       Subjective: Chief Complaint   Patient presents with   • Follow-up     3 month, not able to have labs done but will go during [place] break. Left wrist hurts because accouple of weeks ago hurt it        Patient ID: Harrison Vu is a 37 y.o. female presents today for a routine checkup. Harrison Vu is a 37 y.o. female who presents today for follow-up of her hypertension, hyperlipidemia, prediabetes, and depression. She is doing well on her medications overall. The patient has been having pain in the left wrist - for 3 weeks- she fell on a hay truck- she slipped and was stuck between hat and the truck. She has had pain in the wrist since then. There is no swelling in the wrist- she iced it. There is no numbness or tingling. There is no change- it is better than when it happened. She is right hand dominant. It is achy. There is no radiation. She tried Voltaren gel. There are no problems with the Metformin. She lost 2 lbs. The patient denies any chest pain, shortness of breath, or palpitations. There is no edema. There are no headaches or visual changes. There is no lightheadedness, dizziness, or fainting spells. She needs to get her Blood work. There are no side effects with any of the medications. Hypertension  This is a chronic problem. The current episode started more than 1 year ago. The problem is unchanged. The problem is controlled. Pertinent negatives include no anxiety, blurred vision, chest pain, headaches, malaise/fatigue, neck pain, orthopnea, palpitations, peripheral edema, PND, shortness of breath or sweats.      The following portions of the patient's history were reviewed and updated as appropriate: allergies, current medications, past family history, past medical history, past social history, past surgical history and problem list.  Patient Active Problem List   Diagnosis   • Depression, recurrent ([address])   • Generalized anxiety disorder   • S/P abdominal supracervical subtotal hysterectomy   • Allergic rhinitis   • Disturbance in sleep behavior   • Class 3 severe obesity due to excess calories without serious comorbidity with body mass index (BMI) of 45.0 to 49.9 in adult (HCC)   • Muscle strain of left thigh   • Obstructive sleep apnea treated with continuous positive airway pressure (CPAP)   • Primary sleep disturbance   • Excessive daytime sleepiness   • Nocturia   • Primary hypertension   • Snoring   • Prediabetes   • Mixed hyperlipidemia   • Bilateral carpal tunnel syndrome   • De Quervain's tenosynovitis   • Left wrist sprain, initial encounter     Past Medical History:   Diagnosis Date   • Allergic rhinitis    • Anxiety    • Depression    • Family health problem     mother and grandmother h/o blood clots after surgery   • Fibroid    • Fullness in ear, left     Resolved [id number]   • Hypertension     Resolved [id number]   • Sleep apnea    • Urinary tract infection     h/o     Past Surgical History:   Procedure Laterality Date   • CHOLECYSTECTOMY     • HYSTERECTOMY     • NC TOTAL ABDOMINAL HYSTERECT W/WO RMVL TUBE OVARY N/A 6/18/2019    Procedure: Supracervical IWONA, removal of bilat tubes, removal of IUD, cystoscopy;  Surgeon: Lilibeth Lambert MD;  Location: [facility] OR;  Service: Gynecology   • TOOTH EXTRACTION       Allergies   Allergen Reactions   • Pollen Extract Allergic Rhinitis     Family History   Problem Relation Age of Onset   • Anxiety disorder Mother    • Other Mother         Blood clots   • Hypertension Mother    • Hyperlipidemia Mother    • Alcohol abuse Father    • Hypertension Brother    • Anxiety disorder Maternal Grandmother    • Other Maternal Grandmother         Blood clots   • Lung cancer Maternal Grandfather    • No Known Problems Paternal Grandmother    • No Known Problems Paternal Grandfather    • Ovarian cancer Maternal Aunt 68   • Alcohol abuse Maternal Aunt 72   • Prostate cancer Maternal Uncle    • No Known Problems Paternal Aunt    • Heart disease Family cardiac disorder   • Cancer Family         lung ca   • Diabetes Family    • Cancer Family      Social History     Socioeconomic History   • Marital status: /Civil Union     Spouse name: Not on file   • Number of children: Not on file   • Years of education: Not on file   • Highest education level: Not on file   Occupational History   • Not on file   Tobacco Use   • Smoking status: Never     Passive exposure: Past   • Smokeless tobacco: Never   Vaping Use   • Vaping Use: Never used   Substance and Sexual Activity   • Alcohol use: Yes     Comment: rare   • Drug use: No   • Sexual activity: Yes     Partners: Male     Birth control/protection: Female Sterilization   Other Topics Concern   • Not on file   Social History Narrative    Always uses seat belt    Caffeine use    [employer] ([employer])     Social Determinants of Health     Financial Resource Strain: Low Risk  (1/18/2021)    Overall Financial Resource Strain (CARDIA)    • Difficulty of Paying Living Expenses: Not hard at all   Food Insecurity: No Food Insecurity (1/18/2021)    Hunger Vital Sign    • Worried About Running Out of Food in the Last Year: Never true    • Ran Out of Food in the Last Year: Never true   Transportation Needs: No Transportation Needs (1/18/2021)    PRAPARE - Transportation    • Lack of Transportation (Medical): No    • Lack of Transportation (Non-Medical): No   Physical Activity: Inactive (9/7/2022)    Exercise Vital Sign    • Days of Exercise per Week: 0 days    • Minutes of Exercise per Session: 0 min   Stress: No Stress Concern Present (9/7/2022)    [facility]    • Feeling of Stress :  Only a little   Social Connections: Socially Integrated (4/20/2021)    Social Connection and Isolation Panel [NHANES]    • Frequency of Communication with Friends and Family: More than three times a week    • Frequency of Social Gatherings with Friends and Family: Once a week    • Attends Taoist Services: More than 4 times per year    • Active Member of Clubs or Organizations: Yes    • Attends Club or Organization Meetings: More than 4 times per year    • Marital Status:    Intimate Partner Violence: Not At Risk (12/11/2023)    Humiliation, Afraid, Rape, and Kick questionnaire    • Fear of Current or Ex-Partner: No    • Emotionally Abused: No    • Physically Abused: No    • Sexually Abused: No   Housing Stability: Not on file     Current Outpatient Medications on File Prior to Visit   Medication Sig Dispense Refill   • acetaminophen (TYLENOL) 500 mg tablet Take 500 mg by mouth every 6 (six) hours as needed for mild pain As needed     • amLODIPine (NORVASC) 5 mg tablet TAKE 1 TABLET (5 MG TOTAL) BY MOUTH DAILY. 90 tablet 2   • Azelastine HCl 137 MCG/SPRAY SOLN 1-2 sprays twice a day 90 mL 3   • calcium carbonate (OS-MAUREEN) 600 MG tablet Take 600 mg by mouth daily     • Cetirizine HCl 10 MG CAPS Take 1 capsule by mouth daily as needed      • Diclofenac Sodium (VOLTAREN) 1 % Apply 2 g topically 4 (four) times a day 100 g 1   • Elastic Bandages & Supports (B & B Carpal Tunnel Brace) MISC Use daily Bilateral carpal tunnel wrist braces.  2 each 0   • ibuprofen (MOTRIN) 400 mg tablet Take 400 mg by mouth if needed     • LORazepam (ATIVAN) 0.5 mg tablet Take 1 tablet (0.5 mg total) by mouth every 6 (six) hours as needed for anxiety 30 tablet 0   • metFORMIN (GLUCOPHAGE-XR) 500 mg 24 hr tablet TAKE 1 TABLET BY MOUTH EVERY DAY WITH DINNER 90 tablet 2   • montelukast (SINGULAIR) 10 mg tablet Take 1 tablet (10 mg total) by mouth daily at bedtime 90 tablet 3   • Multiple Vitamin (MULTIVITAMIN PO) Take by mouth in the morning     • rosuvastatin (CRESTOR) 10 MG tablet TAKE 1 TABLET BY MOUTH EVERY DAY 90 tablet 2   • sertraline (Zoloft) 50 mg tablet Take 1 tablet (50 mg total) by mouth daily 90 tablet 3   • Tuberculin-Allergy Syringes (ALLERGY SYRINGE 1CC/27GX1/2") 27G X 1/2" 1 ML MISC by Subcutaneous (multi inj) route once a week 1 box of 100 ct 100 each 1   • EPINEPHrine (EPIPEN) 0.3 mg/0.3 mL SOAJ Inject 0.3 mL (0.3 mg total) into a muscle once for 1 dose 0.6 mL 0   • meloxicam (MOBIC) 15 mg tablet TAKE 1 TABLET (15 MG TOTAL) BY MOUTH DAILY. 30 tablet 0     No current facility-administered medications on file prior to visit. Review of Systems   Constitutional: Negative. Negative for malaise/fatigue. HENT: Negative. Eyes: Negative. Negative for blurred vision. Respiratory: Negative. Negative for shortness of breath. Cardiovascular: Negative. Negative for chest pain, palpitations, orthopnea and PND. Gastrointestinal: Negative. Endocrine: Negative. Genitourinary: Negative. Musculoskeletal:  Positive for arthralgias. Negative for neck pain. Skin: Negative. Allergic/Immunologic: Negative. Neurological: Negative. Negative for headaches. Hematological: Negative. Psychiatric/Behavioral: Negative. Objective:  Vitals:    12/11/23 1643 12/11/23 1700   BP: 144/98 132/84   BP Location: Right arm    Patient Position: Sitting    Cuff Size: Large    Pulse: 76 68   Resp: 16    Temp: 98.3 °F (36.8 °C)    TempSrc: Tympanic    SpO2: 98%    Weight: (!) 145 kg (320 lb 3.2 oz)    Height: 5' 7.5" (1.715 m)      Body mass index is 49.41 kg/m². Physical Exam  Constitutional:       Appearance: She is well-developed. HENT:      Head: Normocephalic and atraumatic. Right Ear: Tympanic membrane, ear canal and external ear normal.      Left Ear: Tympanic membrane, ear canal and external ear normal.      Nose: Nose normal.      Mouth/Throat:      Mouth: Mucous membranes are moist.      Pharynx: No oropharyngeal exudate. Eyes:      Extraocular Movements: Extraocular movements intact. Conjunctiva/sclera: Conjunctivae normal.      Pupils: Pupils are equal, round, and reactive to light. Neck:      Thyroid: No thyromegaly. Vascular: No JVD. Trachea: No tracheal deviation. Cardiovascular:      Rate and Rhythm: Normal rate and regular rhythm. Pulses: Normal pulses. Heart sounds: Normal heart sounds. No murmur heard. No friction rub. No gallop. Pulmonary:      Effort: Pulmonary effort is normal. No respiratory distress. Breath sounds: Normal breath sounds. No stridor. No wheezing or rales. Chest:      Chest wall: No tenderness. Abdominal:      General: Bowel sounds are normal. There is no distension. Palpations: Abdomen is soft. There is no mass. Tenderness: There is no abdominal tenderness. There is no guarding or rebound. Musculoskeletal:         General: Tenderness present. No swelling, deformity or signs of injury. Normal range of motion. Right wrist: Normal.      Left wrist: Tenderness present. No swelling, deformity, effusion, lacerations, bony tenderness, snuff box tenderness or crepitus. Normal range of motion. Normal pulse. Cervical back: Normal range of motion. Right lower leg: No edema. Left lower leg: No edema. Lymphadenopathy:      Cervical: No cervical adenopathy. Skin:     General: Skin is warm and dry. Neurological:      General: No focal deficit present. Mental Status: She is alert and oriented to person, place, and time. Cranial Nerves: No cranial nerve deficit. Motor: No abnormal muscle tone. Coordination: Coordination normal.      Deep Tendon Reflexes: Reflexes are normal and symmetric.  Reflexes normal.

## 2023-12-12 NOTE — ASSESSMENT & PLAN NOTE
The patient will continue on the current dose of her rosuvastatin. She will do this along with diet and exercise. We will see her back as scheduled.

## 2023-12-12 NOTE — ASSESSMENT & PLAN NOTE
The patient will continue to work on her diet and exercise to lose weight. We will see her back in the office as scheduled.

## 2023-12-12 NOTE — ASSESSMENT & PLAN NOTE
The patient will continue with the current dose of the metformin. We have made no changes today. We will see her back as scheduled.

## 2023-12-12 NOTE — ASSESSMENT & PLAN NOTE
The patient's blood pressure is well-controlled with her current medication. She will continue to work on her diet and exercise and losing weight. We will see her back in the office as scheduled.

## 2023-12-12 NOTE — ASSESSMENT & PLAN NOTE
The patient suffered a mild sprain of her left wrist.  We will check the x-ray as a precaution. We will have her continue with alternate between ice and heat to the area and the Voltaren gel as needed. I advised her to give it a few more weeks. She will follow-up if there is no improvement or if the condition worsens.

## 2023-12-18 ENCOUNTER — OFFICE VISIT (OUTPATIENT)
Dept: URGENT CARE | Facility: CLINIC | Age: 43
End: 2023-12-18
Payer: COMMERCIAL

## 2023-12-18 VITALS
OXYGEN SATURATION: 98 % | SYSTOLIC BLOOD PRESSURE: 136 MMHG | WEIGHT: 293 LBS | RESPIRATION RATE: 18 BRPM | HEART RATE: 97 BPM | TEMPERATURE: 97.6 F | DIASTOLIC BLOOD PRESSURE: 81 MMHG | BODY MASS INDEX: 44.41 KG/M2 | HEIGHT: 68 IN

## 2023-12-18 DIAGNOSIS — J06.9 ACUTE URI: Primary | ICD-10-CM

## 2023-12-18 PROCEDURE — 99213 OFFICE O/P EST LOW 20 MIN: CPT | Performed by: NURSE PRACTITIONER

## 2023-12-18 RX ORDER — BROMPHENIRAMINE MALEATE, PSEUDOEPHEDRINE HYDROCHLORIDE, AND DEXTROMETHORPHAN HYDROBROMIDE 2; 30; 10 MG/5ML; MG/5ML; MG/5ML
10 SYRUP ORAL 3 TIMES DAILY PRN
Qty: 120 ML | Refills: 0 | Status: SHIPPED | OUTPATIENT
Start: 2023-12-18 | End: 2023-12-23 | Stop reason: RX

## 2023-12-18 RX ORDER — PREDNISONE 20 MG/1
20 TABLET ORAL 2 TIMES DAILY WITH MEALS
Qty: 10 TABLET | Refills: 0 | Status: SHIPPED | OUTPATIENT
Start: 2023-12-18 | End: 2023-12-23

## 2023-12-18 NOTE — PROGRESS NOTES
[facility]        NAME: Alejandrina Aranda is a 43 y.o. female  : 1980    MRN: [id number]  DATE: 2023  TIME: 8:59 AM    Assessment and Plan   Acute URI [J06.9]  1. Acute URI  predniSONE 20 mg tablet    brompheniramine-pseudoephedrine-DM 30-2-10 MG/5ML syrup        Start course of prednisone and bromfed   Ok to rtn to work tomorrow   F/u with pcp prn    Patient Instructions     Follow up with PCP in 3-5 days.  Proceed to  ER if symptoms worsen.    Chief Complaint     Chief Complaint   Patient presents with    Cold Like Symptoms     Started 6 days ago with cough, runny nose, congestion and a HA. States that she feels it has moved to her chest and she is having trouble breathing. COVID negative         History of Present Illness   Alejandrina Aranda presents to the clinic c/o    Cold Like Symptoms (Started 6 days ago with cough, runny nose, congestion and a HA. States that she feels it has moved to her chest and she is having trouble breathing. COVID negative)  States started on Thursday with laryngitis -   Then progressed to headache, fatigue, cough, congestion   No improvement with dayquil/nyquil.  Advil sinus with some relief but not long lasting.  Covid test at home was negative        Review of Systems   Review of Systems   All other systems reviewed and are negative.        Current Medications     Long-Term Medications   Medication Sig Dispense Refill    amLODIPine (NORVASC) 5 mg tablet TAKE 1 TABLET (5 MG TOTAL) BY MOUTH DAILY. 90 tablet 2    calcium carbonate (OS-MAUREEN) 600 MG tablet Take 600 mg by mouth daily      Cetirizine HCl 10 MG CAPS Take 1 capsule by mouth daily as needed       Diclofenac Sodium (VOLTAREN) 1 % Apply 2 g topically 4 (four) times a day 100 g 1    EPINEPHrine (EPIPEN) 0.3 mg/0.3 mL SOAJ Inject 0.3 mL (0.3 mg total) into a muscle once for 1 dose 0.6 mL 0    LORazepam (ATIVAN) 0.5 mg tablet Take 1 tablet (0.5 mg total) by mouth every 6 (six) hours as needed for anxiety 30  "tablet 0    meloxicam (MOBIC) 15 mg tablet TAKE 1 TABLET (15 MG TOTAL) BY MOUTH DAILY. 30 tablet 0    metFORMIN (GLUCOPHAGE-XR) 500 mg 24 hr tablet TAKE 1 TABLET BY MOUTH EVERY DAY WITH DINNER 90 tablet 2    montelukast (SINGULAIR) 10 mg tablet Take 1 tablet (10 mg total) by mouth daily at bedtime 90 tablet 3    rosuvastatin (CRESTOR) 10 MG tablet TAKE 1 TABLET BY MOUTH EVERY DAY 90 tablet 2    sertraline (Zoloft) 50 mg tablet Take 1 tablet (50 mg total) by mouth daily 90 tablet 3    Tuberculin-Allergy Syringes (ALLERGY SYRINGE 1CC/27GX1/2\") 27G X 1/2\" 1 ML MISC by Subcutaneous (multi inj) route once a week 1 box of 100 ct 100 each 1       Current Allergies     Allergies as of 12/18/2023 - Reviewed 12/18/2023   Allergen Reaction Noted    Pollen extract Allergic Rhinitis 07/20/2021            The following portions of the patient's history were reviewed and updated as appropriate: allergies, current medications, past family history, past medical history, past social history, past surgical history and problem list.    Objective   /81   Pulse 97   Temp 97.6 °F (36.4 °C) (Tympanic)   Resp 18   Ht 5' 7.5\" (1.715 m)   Wt (!) 145 kg (320 lb)   LMP 05/15/2019 (Approximate)   SpO2 98%   BMI 49.38 kg/m²        Physical Exam     Physical Exam  Vitals and nursing note reviewed.   Constitutional:       Appearance: Normal appearance. She is well-developed.   HENT:      Head: Normocephalic and atraumatic.      Right Ear: Hearing, tympanic membrane, ear canal and external ear normal.      Left Ear: Hearing, tympanic membrane, ear canal and external ear normal.      Nose:      Right Sinus: No maxillary sinus tenderness or frontal sinus tenderness.      Left Sinus: Maxillary sinus tenderness present. No frontal sinus tenderness.      Mouth/Throat:      Lips: Pink.      Mouth: Mucous membranes are moist.      Pharynx: Oropharynx is clear.   Eyes:      General: Lids are normal.      Conjunctiva/sclera: Conjunctivae " normal.      Pupils: Pupils are equal, round, and reactive to light.   Cardiovascular:      Rate and Rhythm: Normal rate and regular rhythm.      Heart sounds: Normal heart sounds, S1 normal and S2 normal.   Pulmonary:      Effort: Pulmonary effort is normal.      Breath sounds: Normal breath sounds.   Abdominal:      General: Abdomen is flat. Bowel sounds are normal.      Palpations: Abdomen is soft.   Musculoskeletal:         General: Normal range of motion.      Cervical back: Full passive range of motion without pain, normal range of motion and neck supple.   Skin:     General: Skin is warm and dry.   Neurological:      General: No focal deficit present.      Mental Status: She is alert and oriented to person, place, and time.   Psychiatric:         Mood and Affect: Mood normal.         Speech: Speech normal.         Behavior: Behavior normal. Behavior is cooperative.         Thought Content: Thought content normal.         Judgment: Judgment normal.

## 2023-12-23 ENCOUNTER — OFFICE VISIT (OUTPATIENT)
Dept: URGENT CARE | Facility: MEDICAL CENTER | Age: 43
End: 2023-12-23
Payer: COMMERCIAL

## 2023-12-23 VITALS
TEMPERATURE: 98.5 F | RESPIRATION RATE: 18 BRPM | HEART RATE: 75 BPM | SYSTOLIC BLOOD PRESSURE: 155 MMHG | DIASTOLIC BLOOD PRESSURE: 90 MMHG | OXYGEN SATURATION: 99 %

## 2023-12-23 DIAGNOSIS — J20.9 ACUTE BRONCHITIS, UNSPECIFIED ORGANISM: Primary | ICD-10-CM

## 2023-12-23 PROCEDURE — 99213 OFFICE O/P EST LOW 20 MIN: CPT | Performed by: PHYSICIAN ASSISTANT

## 2023-12-23 RX ORDER — ALBUTEROL SULFATE 90 UG/1
2 AEROSOL, METERED RESPIRATORY (INHALATION) EVERY 6 HOURS PRN
Qty: 18 G | Refills: 0 | Status: SHIPPED | OUTPATIENT
Start: 2023-12-23

## 2023-12-23 RX ORDER — AZITHROMYCIN 250 MG/1
TABLET, FILM COATED ORAL
Qty: 6 TABLET | Refills: 0 | Status: SHIPPED | OUTPATIENT
Start: 2023-12-23 | End: 2023-12-28

## 2023-12-23 RX ORDER — CODEINE PHOSPHATE/GUAIFENESIN 10-100MG/5
LIQUID (ML) ORAL
Qty: 120 ML | Refills: 0 | Status: SHIPPED | OUTPATIENT
Start: 2023-12-23

## 2023-12-23 NOTE — PROGRESS NOTES
[facility]    NAME: Alejandrina Aranda is a 43 y.o. female  : 1980    MRN: [id number]  DATE: 2023  TIME: 11:18 AM    Assessment and Plan   Acute bronchitis, unspecified organism [J20.9]  1. Acute bronchitis, unspecified organism  azithromycin (ZITHROMAX) 250 mg tablet    albuterol (PROVENTIL HFA,VENTOLIN HFA) 90 mcg/act inhaler    guaifenesin-codeine (GUAIFENESIN AC) 100-10 MG/5ML liquid        Patient instructed not to use her lorazepam while using the cough suppressant medication.  She expressed understanding.  Patient Instructions     Patient Instructions   This still could be viral respiratory illness however due to worsening of symptoms will try antibiotic.  Take as instructed.  Albuterol inhaler to use as needed for cough, spastically cough, wheezing, tightness in chest.    We will try to prescribe Robitussin with codeine.  If not available at pharmacy, may continue with potential over-the-counter remedies.    Tea with honey may be soothing.  Vaporizer by the bedside may be helpful.  Flonase nasal spray use daily for the next 2 to 3 weeks to try and decrease postnasal drip and irritation from that.    If you would develop any significant weakness, worsening chest pain and worsening shortness of breath proceed to emergency room for further evaluation.    Chief Complaint     Chief Complaint   Patient presents with    Shortness of Breath     Patient c/o cough, congestion and SOB x 1 week . Patient was seen in another urgent care and started on prednisone but only feel worst.        History of Present Illness   Alejandrina Aranda presents to the clinic c/o  43-year-old female comes in with increasing cough and tightness in chest with shortness of breath.    Started: Started around 2023.  Was seen at urgent care on 2023 and placed on prednisone 20 mg for 5 days and brompheniramine.  Associated signs and symptoms: Hoarseness and sore throat have resolved but continues  with postnasal drip.  She continues with cough but that is a little bit less.  She notes some chest congestion, dyspnea on exertion, wheezing.  Worse with laying down.  Modifying factors: Prednisone did not seem to help.  Pharmacy did not have the brompheniramine cough medicine.  She has been trying over-the-counter cough medicines without much relief.    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  Recent travel:  No.  Hx asthma:  No.  Hx pneumonia:  No.  Smoker: No.    Shortness of Breath  Associated symptoms include coughing, fatigue and wheezing. Pertinent negatives include no rhinorrhea or sore throat.       Review of Systems   Review of Systems   Constitutional:  Positive for activity change and fatigue. Negative for appetite change, chills, diaphoresis and fever.   HENT:  Positive for postnasal drip. Negative for congestion, ear discharge, ear pain, rhinorrhea, sinus pressure, sinus pain and sore throat.    Eyes: Negative.    Respiratory:  Positive for cough, chest tightness, shortness of breath and wheezing.    Cardiovascular: Negative.    Hematological: Negative.        Current Medications     Long-Term Medications   Medication Sig Dispense Refill    amLODIPine (NORVASC) 5 mg tablet TAKE 1 TABLET (5 MG TOTAL) BY MOUTH DAILY. 90 tablet 2    calcium carbonate (OS-MAUREEN) 600 MG tablet Take 600 mg by mouth daily      Cetirizine HCl 10 MG CAPS Take 1 capsule by mouth daily as needed       Diclofenac Sodium (VOLTAREN) 1 % Apply 2 g topically 4 (four) times a day 100 g 1    EPINEPHrine (EPIPEN) 0.3 mg/0.3 mL SOAJ Inject 0.3 mL (0.3 mg total) into a muscle once for 1 dose 0.6 mL 0    LORazepam (ATIVAN) 0.5 mg tablet Take 1 tablet (0.5 mg total) by mouth every 6 (six) hours as needed for anxiety 30 tablet 0    meloxicam (MOBIC) 15 mg tablet TAKE 1 TABLET (15 MG TOTAL) BY MOUTH DAILY. 30 tablet 0    metFORMIN (GLUCOPHAGE-XR) 500 mg 24 hr tablet TAKE 1 TABLET BY MOUTH EVERY DAY WITH DINNER 90 tablet 2    montelukast (SINGULAIR)  "10 mg tablet Take 1 tablet (10 mg total) by mouth daily at bedtime 90 tablet 3    rosuvastatin (CRESTOR) 10 MG tablet TAKE 1 TABLET BY MOUTH EVERY DAY 90 tablet 2    sertraline (Zoloft) 50 mg tablet Take 1 tablet (50 mg total) by mouth daily 90 tablet 3    Tuberculin-Allergy Syringes (ALLERGY SYRINGE 1CC/27GX1/2\") 27G X 1/2\" 1 ML MISC by Subcutaneous (multi inj) route once a week 1 box of 100 ct 100 each 1       Current Allergies     Allergies as of 12/23/2023 - Reviewed 12/23/2023   Allergen Reaction Noted    Pollen extract Allergic Rhinitis 07/20/2021          The following portions of the patient's history were reviewed and updated as appropriate: allergies, current medications, past family history, past medical history, past social history, past surgical history and problem list.  Past Medical History:   Diagnosis Date    Allergic rhinitis     Anxiety     Depression     Family health problem     mother and grandmother h/o blood clots after surgery    Fibroid     Fullness in ear, left     Resolved [id number]    Hypertension     Resolved [id number]    Sleep apnea     Urinary tract infection     h/o     Past Surgical History:   Procedure Laterality Date    CHOLECYSTECTOMY      HYSTERECTOMY      NE TOTAL ABDOMINAL HYSTERECT W/WO RMVL TUBE OVARY N/A 6/18/2019    Procedure: Supracervical IWONA, removal of bilat tubes, removal of IUD, cystoscopy;  Surgeon: Geovanni Segura MD;  Location: [facility] OR;  Service: Gynecology    TOOTH EXTRACTION       Family History   Problem Relation Age of Onset    Anxiety disorder Mother     Other Mother         Blood clots    Hypertension Mother     Hyperlipidemia Mother     Alcohol abuse Father     Hypertension Brother     Anxiety disorder Maternal Grandmother     Other Maternal Grandmother         Blood clots    Lung cancer Maternal Grandfather     No Known Problems Paternal Grandmother     No Known Problems Paternal Grandfather     Ovarian cancer Maternal Aunt 68    Alcohol abuse Maternal " Aunt 65    Prostate cancer Maternal Uncle     No Known Problems Paternal Aunt     Heart disease Family         cardiac disorder    Cancer Family         lung ca    Diabetes Family     Cancer Family        Objective   /90   Pulse 75   Temp 98.5 °F (36.9 °C)   Resp 18   LMP 05/15/2019 (Approximate)   SpO2 99%   Patient's last menstrual period was 05/15/2019 (approximate).       Physical Exam     Physical Exam  Vitals and nursing note reviewed.   Constitutional:       General: She is not in acute distress.     Appearance: She is well-developed. She is ill-appearing. She is not toxic-appearing or diaphoretic.      Comments: Appears mildly ill but in no acute distress.  No trismus or conversational dyspnea.   HENT:      Head: Normocephalic and atraumatic.      Right Ear: Tympanic membrane, ear canal and external ear normal.      Left Ear: Tympanic membrane, ear canal and external ear normal.      Nose: Congestion present. No rhinorrhea.      Mouth/Throat:      Mouth: Mucous membranes are moist.      Pharynx: Posterior oropharyngeal erythema present. No oropharyngeal exudate.      Comments: Cobblestoning posterior pharynx with patchy redness  Eyes:      General:         Right eye: No discharge.         Left eye: No discharge.      Conjunctiva/sclera: Conjunctivae normal.      Pupils: Pupils are equal, round, and reactive to light.   Cardiovascular:      Rate and Rhythm: Normal rate and regular rhythm.      Heart sounds: Normal heart sounds. No murmur heard.     No friction rub. No gallop.   Pulmonary:      Effort: Pulmonary effort is normal. No tachypnea, bradypnea, accessory muscle usage or respiratory distress.      Breath sounds: Normal breath sounds. No stridor. No decreased breath sounds, wheezing, rhonchi or rales.      Comments: Good airflow throughout.  No wheezes rales or rhonchi.  Musculoskeletal:      Cervical back: Normal range of motion and neck supple. No rigidity or tenderness.   Lymphadenopathy:       Cervical: No cervical adenopathy.   Skin:     General: Skin is warm and dry.      Coloration: Skin is not jaundiced or pale.      Findings: No rash.   Neurological:      Mental Status: She is alert and oriented to person, place, and time.   Psychiatric:         Mood and Affect: Mood normal.         Behavior: Behavior normal.

## 2023-12-23 NOTE — PATIENT INSTRUCTIONS
This still could be viral respiratory illness however due to worsening of symptoms will try antibiotic.  Take as instructed.  Albuterol inhaler to use as needed for cough, spastically cough, wheezing, tightness in chest.    We will try to prescribe Robitussin with codeine.  If not available at pharmacy, may continue with potential over-the-counter remedies.    Tea with honey may be soothing.  Vaporizer by the bedside may be helpful.  Flonase nasal spray use daily for the next 2 to 3 weeks to try and decrease postnasal drip and irritation from that.    If you would develop any significant weakness, worsening chest pain and worsening shortness of breath proceed to emergency room for further evaluation.

## 2024-01-09 DIAGNOSIS — F33.9 DEPRESSION, RECURRENT (HCC): ICD-10-CM

## 2024-01-09 DIAGNOSIS — F41.1 GENERALIZED ANXIETY DISORDER: ICD-10-CM

## 2024-01-09 RX ORDER — LORAZEPAM 0.5 MG/1
0.5 TABLET ORAL EVERY 6 HOURS PRN
Qty: 30 TABLET | Refills: 0 | Status: SHIPPED | OUTPATIENT
Start: 2024-01-09

## 2024-01-12 LAB
ALBUMIN SERPL-MCNC: 4.3 G/DL (ref 3.9–4.9)
ALBUMIN/GLOB SERPL: 1.8 {RATIO} (ref 1.2–2.2)
ALP SERPL-CCNC: 85 IU/L (ref 44–121)
ALT SERPL-CCNC: 16 IU/L (ref 0–32)
AST SERPL-CCNC: 15 IU/L (ref 0–40)
BILIRUB SERPL-MCNC: 0.2 MG/DL (ref 0–1.2)
BUN SERPL-MCNC: 9 MG/DL (ref 6–24)
BUN/CREAT SERPL: 15 (ref 9–23)
CALCIUM SERPL-MCNC: 9.3 MG/DL (ref 8.7–10.2)
CHLORIDE SERPL-SCNC: 102 MMOL/L (ref 96–106)
CO2 SERPL-SCNC: 24 MMOL/L (ref 20–29)
CREAT SERPL-MCNC: 0.6 MG/DL (ref 0.57–1)
EGFR: 113 ML/MIN/1.73
EST. AVERAGE GLUCOSE BLD GHB EST-MCNC: 134 MG/DL
GLOBULIN SER-MCNC: 2.4 G/DL (ref 1.5–4.5)
GLUCOSE SERPL-MCNC: 110 MG/DL (ref 70–99)
HBA1C MFR BLD: 6.3 % (ref 4.8–5.6)
POTASSIUM SERPL-SCNC: 4.2 MMOL/L (ref 3.5–5.2)
PROT SERPL-MCNC: 6.7 G/DL (ref 6–8.5)
SODIUM SERPL-SCNC: 139 MMOL/L (ref 134–144)

## 2024-01-15 ENCOUNTER — OFFICE VISIT (OUTPATIENT)
Dept: SLEEP CENTER | Facility: CLINIC | Age: 44
End: 2024-01-15
Payer: COMMERCIAL

## 2024-01-15 VITALS
WEIGHT: 293 LBS | DIASTOLIC BLOOD PRESSURE: 92 MMHG | BODY MASS INDEX: 45.99 KG/M2 | HEIGHT: 67 IN | SYSTOLIC BLOOD PRESSURE: 143 MMHG | HEART RATE: 95 BPM

## 2024-01-15 DIAGNOSIS — G47.33 OBSTRUCTIVE SLEEP APNEA TREATED WITH CONTINUOUS POSITIVE AIRWAY PRESSURE (CPAP): Primary | ICD-10-CM

## 2024-01-15 DIAGNOSIS — F51.04 CHRONIC INSOMNIA: ICD-10-CM

## 2024-01-15 PROCEDURE — 99214 OFFICE O/P EST MOD 30 MIN: CPT

## 2024-01-15 NOTE — PROGRESS NOTES
[facility]  Sleep Medicine Follow Up/Established Patient    PATIENT NAME: Alejandrina Aranda  DATE OF SERVICE: January 15, 2024  DATE OF LAST VISIT: 12/27/2022    ASSESSMENT/PLAN:  Alejandrina Aranda is a 43 y.o. female with PMHx of SID on CPAP, HTN, HLD, pre-DM, depression, anxiety and obesity who returns to the office for follow up of SID.    SID on CPAP  -Patient has history of moderate SID diagnosed in 6/2022 (DOMINGUEZ 15.2, O2 dante 88%), and is on auto CPAP 5-20 cmH2O at this time.  She stopped using her device for 2 to 3 months due to her respiratory illness, but is making efforts to try and use it again.  She feels her pressure is too high at this point time; however, when a change in pressure was offered for at least a short period of time she declined and said she would rather just try and get used to the machine again.  She does get some benefit from the machine in that she is able to sleep through the night, but she continues to report EDS.  Will delay any further w/u of fatigue/sleepiness until she is consistently utilizing PAP therapy again.  - Continue APAP 5-20 cmH2O with a hybrid mask.  Refill of supplies was sent to the patient's DME.  - Explained the importance of keeping the machine clean, and that they should be eligible for refills of supplies every 3-6 months depending on insurance.  - Encouraged healthy lifestyle with adequate sleep (7-9 hours per night), healthy balanced diet and routine exercise.  Explained the importance of avoiding driving while drowsy.  - Follow-up in 3 months with repeat compliance check at that time  -     PAP DME Resupply/Reorder    Chronic Insomnia  - The patient reports difficulty falling asleep.  Her insomnia appears to be multifactorial with psychophysiologic (anxiety) and behavioral components (lack of stimulus control).  - Current medication regimen includes: None  - Discussed the importance of stimulus control and sleep hygiene.  Recommend she leave  the bed at night when she is unable to sleep and perform non-stimulating activities until sleepy again.  - Avoid the bed/bedroom during the day time.  - Recommend scheduling planned worry time and journaling a few hours before bed to help with anxiety and racing thoughts that are likely contributing to her inability to fall asleep at night.  - We discussed the role of CBT-I during today's visit, but she would like to try and work on behavioral changes/stimulus control prior to referral.  ________________________________________________________________________________________________    Interval History: Alejandrina Aranda is a 43 y.o. female with PMHx of SID on CPAP, HTN, HLD, pre-DM, depression, anxiety and obesity who returns to the office for follow up of SID.  Patient reports since her last office visit she had a significant respiratory illness, and stopped using her CPAP completely for 2 to 3 months due to her symptoms.  She states since the new year she has been working on wearing her PAP every night again, and has managed to wear it for 8 of the last 10 days with 6 of those days being greater than 4 hours usage.  She states since starting back on the CPAP he feels that her pressures are too high.  Additionally she reports difficulties with falling asleep as detailed below.    PAP History  Sleep Apnea Type: SID  Most Recent AHI: 15.2 on 6/13/2022  Treatment: APAP    DME: [employer]    Uses APAP for 5 hours per night, 3-4 nights per week.  Current PAP Settings: 5-20 cmH2O  Compliance Data: Not currently compliant, 20%  Residual AHI 2.3  Median pressure 10.4 cmH2O  95th percentile pressure 13.5 cmH2O  95th percentile leak 21.2 L/min    Mask Type: hybrid  PAP Issues: pressure too high  Uses Chin Strap: No  Uses Ramp Function: Yes   Uses Humidity: Yes     There is a perceived benefit by the patient: she will sleep through the night, but she doesn't feel better in the morning  Observers report no longer has snoring with APAP  use.    Insomnia Checklist  Timing: onset  Current Medications: none  Prior Medications: none  Difficulty falling back to sleep: No   Anxiety/Rumination: Yes, will have racing thoughts at night when trying to fall asleep, often thoughts about work.    Pre-Bed Routine: consistent  Awake in bed > 20 min: Yes, will spend the entire time in bed trying fall asleep    In bed during the day: No   Screen use in bed: Yes, sometimes uses phone    Exercise within 4 hours of bed: No   Consistent meal times: Yes   Late night snacking: Sometimes  Caffeine: Yes, 48oz of coffee in the morning  Clock Watching: No    Other Information: She has been diagnosed with anxiety and has been working with her PCP to find a regimen that works for her.    Prior History: The patient has a history of moderate sleep apnea first diagnosed in the [facility]\""T in 2022, and started on CPAP in 7/2022.  Initially she had some difficulties getting used to her mask; however, she was eventually able to consistently use the machine.  Unfortunately, even with consistent usage she was still noting daytime sleepiness, plan at last office visit was to evaluate after she had further testing with her PCP for other possible causes of sleepiness/fatigue.    ESS: Total score: 10/24  Greater or equal to 10 is positive for excessive daytime sleepiness  Pertinent Meds: None    Sleep-Wake Schedule:  Bedtime: 1491-8318  Wake Time: 0500  Difficulty Falling Asleep: Yes, takes 1 hour to fall asleep  Avg Number of Awakenings: 2x without CPAP, 0-1x with CPAP  Cause of Awakenings: unclear  Weekend Sleep Schedule: 2100/2200 - 0700  Naps: not usually due to time constraints    Avg TST per 24 hours: 6-7 hours    Past Treatments:  Auto-CPAP    Prior Sleep Studies:  [facility] -- 6/13/2022  DOMINGUEZ - 15.2  O2 Jai - 88%    Other Relevant Labs and Studies:  None    Past Medical History:   Diagnosis Date    Allergic rhinitis     Anxiety     Depression     Family health problem     mother and  grandmother h/o blood clots after surgery    Fibroid     Fullness in ear, left     Resolved [id number]    Hypertension     Resolved [id number]    Sleep apnea     Urinary tract infection     h/o     Past Surgical History:   Procedure Laterality Date    CHOLECYSTECTOMY      HYSTERECTOMY      FL TOTAL ABDOMINAL HYSTERECT W/WO RMVL TUBE OVARY N/A 6/18/2019    Procedure: Supracervical IWONA, removal of bilat tubes, removal of IUD, cystoscopy;  Surgeon: Geovanni Segura MD;  Location: [facility] OR;  Service: Gynecology    TOOTH EXTRACTION       Patient Active Problem List   Diagnosis    Depression, recurrent (HCC)    Generalized anxiety disorder    S/P abdominal supracervical subtotal hysterectomy    Allergic rhinitis    Disturbance in sleep behavior    Class 3 severe obesity due to excess calories without serious comorbidity with body mass index (BMI) of 45.0 to 49.9 in adult (HCC)    Muscle strain of left thigh    Obstructive sleep apnea treated with continuous positive airway pressure (CPAP)    Primary sleep disturbance    Excessive daytime sleepiness    Nocturia    Primary hypertension    Snoring    Prediabetes    Mixed hyperlipidemia    Bilateral carpal tunnel syndrome    De Quervain's tenosynovitis    Left wrist sprain, initial encounter     Allergies as of 01/15/2024 - Reviewed 01/15/2024   Allergen Reaction Noted    Pollen extract Allergic Rhinitis 07/20/2021     REVIEW OF SYSTEMS:  Review of Systems  10-point system review completed, all of which are negative except as mentioned above.    CURRENT MEDICATIONS:  Current Outpatient Medications   Medication Instructions    acetaminophen (TYLENOL) 500 mg, Oral, Every 6 hours PRN, As needed    albuterol (PROVENTIL HFA,VENTOLIN HFA) 90 mcg/act inhaler 2 puffs, Inhalation, Every 6 hours PRN    amLODIPine (NORVASC) 5 mg, Oral, Daily    Azelastine HCl 137 MCG/SPRAY SOLN 1-2 sprays twice a day    calcium carbonate (OS-MAUREEN) 600 mg, Oral, Daily    Cetirizine HCl 10 MG CAPS 1 capsule,  "Oral, Daily PRN    Diclofenac Sodium (VOLTAREN) 2 g, Topical, 4 times daily    Elastic Bandages & Supports (B & B Carpal Tunnel Brace) MISC Does not apply, Daily, Bilateral carpal tunnel wrist braces.    EPINEPHrine (EPIPEN) 0.3 mg, Intramuscular, Once    guaifenesin-codeine (GUAIFENESIN AC) 100-10 MG/5ML liquid 5 mL q 6 hours or hs prn cough.  Home use only.    ibuprofen (MOTRIN) 400 mg, Oral, As needed    LORazepam (ATIVAN) 0.5 mg, Oral, Every 6 hours PRN    meloxicam (MOBIC) 15 mg, Oral, Daily    metFORMIN (GLUCOPHAGE-XR) 500 mg, Oral, Daily with dinner    montelukast (SINGULAIR) 10 mg, Oral, Daily at bedtime    Multiple Vitamin (MULTIVITAMIN PO) Oral, Daily    rosuvastatin (CRESTOR) 10 MG tablet TAKE 1 TABLET BY MOUTH EVERY DAY    sertraline (ZOLOFT) 50 mg, Oral, Daily    Tuberculin-Allergy Syringes (ALLERGY SYRINGE 1CC/27GX1/2\") 27G X 1/2\" 1 ML MISC Subcutaneous (multi inj), Weekly, 1 box of 100 ct     SOCIAL HISTORY:  Social History     Tobacco Use    Smoking status: Never     Passive exposure: Past    Smokeless tobacco: Never   Vaping Use    Vaping status: Never Used   Substance Use Topics    Alcohol use: Yes     Comment: rare    Drug use: No     FAMILY HISTORY:  Family History   Problem Relation Age of Onset    Anxiety disorder Mother     Other Mother         Blood clots    Hypertension Mother     Hyperlipidemia Mother     Alcohol abuse Father     Hypertension Brother     Anxiety disorder Maternal Grandmother     Other Maternal Grandmother         Blood clots    Lung cancer Maternal Grandfather     No Known Problems Paternal Grandmother     No Known Problems Paternal Grandfather     Ovarian cancer Maternal Aunt 68    Alcohol abuse Maternal Aunt 65    Prostate cancer Maternal Uncle     No Known Problems Paternal Aunt     Heart disease Family         cardiac disorder    Cancer Family         lung ca    Diabetes Family     Cancer Family      PHYSICAL EXAMINATION:  Vital Signs:  /92 (BP Location: Left arm, " "Patient Position: Sitting, Cuff Size: Large)   Pulse 95   Ht 5' 7\" (1.702 m)   Wt (!) 144 kg (317 lb)   LMP 05/15/2019 (Approximate)   BMI 49.65 kg/m²   Body mass index is 49.65 kg/m².    Constitutional: NAD, well appearing   Mental Status: AAOx3  Skin: Warm, dry, no rashes noted   Eyes: PERRL, normal conjunctiva  ENT: Nasal congestion absent, nasal valve incompetence absent.  Posterior Airspace:   Ibrahim Tongue Position: 3  Retrognathia: absent  Overbite: absent  High Arched Palate: absent  Tongue Scalloping/Ridging: present  Tonsils: 1+  Uvula: normal  Chest: RRR, +S1/S2, CTA B/L, no W/R/R   Abdomen: Soft, NT/ND  Extremities: No digital clubbing or pedal edema    I have spent a total time of 30 minutes on 01/15/24 in caring for this patient including Instructions for management, Patient and family education, Counseling / Coordination of care, Documenting in the medical record, Reviewing / ordering tests, medicine, procedures  , and Obtaining or reviewing history  .    Kavya Da Silva MD  Pulmonary-Critical Care and Sleep Medicine  01/15/24    Portions of the record may have been created with voice recognition software. Occasional wrong word or \"sound a like\" substitutions may have occurred due to the inherent limitations of voice recognition software. Please read the chart carefully and recognize, using context, where substitutions have occurred.   "

## 2024-01-15 NOTE — PATIENT INSTRUCTIONS
Continue PAP Therapy  - Continue APAP at 5-20 cmH2O.  Remember to clean your mask and equipment regularly, as directed.  You should be eligible for new supplies approximately every 3-6 months, depending on your insurance coverage. Contact your Durable Medical Equipment (DME) company for new supplies as needed.  Follow up in 3 months    - It is important to practice good sleep hygiene, this includes:   - Only getting into bed when you're sleepy, not just because it's bed time   - Maintaining consistent sleep and wake times   - Not spending more than 7-8 hours in bed   - Leaving the bed when unable to fall asleep within ~30 minutes and performing non-stimulating activities until sleepy enough to try again   - Keeping a consistent schedule during the day time to build up sleepiness   - Avoid screen usage prior to bed   - Avoid the bed during the day time to help train your brain that the bed is for sleep    Care and Maintenance  Headgear should be washed as needed. Daily inspection and weekly washings are recommended. Do not disassemble the straps. Machine wash in warm water, making sure to attach Velcro hooks and tabs before washing. Line dry or machine dry on a low setting.  Masks should be washed every day. Daily inspection is recommended. Leave the mask and tubing attached. Gently wash the mask with a soft cloth using warm water and mild detergent, concentrating on the mask cushion flaps. DO NOT use alcohol or bleach. Rinse thoroughly and air dry.  Tubing and headgear should be washed weekly. Daily inspection is recommended. Wash in warm water and mild detergent and rinse thoroughly. Hook the tubing to the machine and blow until dry.  Humidifier should be washed daily and filled with DISTILLED water before use. Wash with warm water and mild detergent. Disinfect weekly by soaking with a solution of 1 part white vinegar and 3 parts water for 30 minutes. Rinse thoroughly and air dry.  Disposable filters should be  replaced once a month. Wash reusable foam filters with warm water and mild detergent at least once a month. Rinse thoroughly and dry with paper towels.  Avoid  that contain fragrance or conditioners, as these will leave a residue.  NEVER iron any soft goods.    Insurance Requirements  Your insurance requires a face-to-face follow up visit within a 31-90 day period after starting PAP therapy.  Your insurance requires compliance with your PAP device, which is at least 4 hours per night for 70% of the time. This must be done over a 30 day period and must occur within the initial 31-90 day period after starting CPAP.  Your insurance also requires at least yearly follow ups to continue to pay for CPAP supplies.     PAP Supply Guidelines  Below are the guidelines for reordering your supplies. You will be responsible for your deductible, co payments, and out of pocket expenses.    Item Frequency   Nasal Mask (no headgear) 1 every 3 months   Nasal Mask Cushion 1 every 2 weeks   Full Face Mask (no headgear) 1 every 3 months   Full Face Mask Cushion 1 every month   Nasal Pillows 1 every 2 weeks   Headgear 1 every 6 months   hin Strap 1 every 6 months   kailey 1 every 3 months   Filters: Reusable 1 every 6 months   Filters: Disposable 1 every 2 weeks   Humidifier Chamber(disposable) 1 every 6 months

## 2024-01-16 ENCOUNTER — TELEPHONE (OUTPATIENT)
Dept: SLEEP CENTER | Facility: CLINIC | Age: 44
End: 2024-01-16

## 2024-01-17 LAB

## 2024-03-15 DIAGNOSIS — Z00.00 ANNUAL PHYSICAL EXAM: ICD-10-CM

## 2024-03-15 RX ORDER — AMLODIPINE BESYLATE 5 MG/1
5 TABLET ORAL DAILY
Qty: 90 TABLET | Refills: 2 | Status: SHIPPED | OUTPATIENT
Start: 2024-03-15

## 2024-06-12 ENCOUNTER — OFFICE VISIT (OUTPATIENT)
Dept: FAMILY MEDICINE CLINIC | Facility: CLINIC | Age: 44
End: 2024-06-12
Payer: COMMERCIAL

## 2024-06-12 VITALS
TEMPERATURE: 98 F | RESPIRATION RATE: 18 BRPM | DIASTOLIC BLOOD PRESSURE: 78 MMHG | HEART RATE: 68 BPM | BODY MASS INDEX: 45.99 KG/M2 | SYSTOLIC BLOOD PRESSURE: 124 MMHG | WEIGHT: 293 LBS | OXYGEN SATURATION: 97 % | HEIGHT: 67 IN

## 2024-06-12 DIAGNOSIS — F41.1 GENERALIZED ANXIETY DISORDER: ICD-10-CM

## 2024-06-12 DIAGNOSIS — G47.33 OBSTRUCTIVE SLEEP APNEA TREATED WITH CONTINUOUS POSITIVE AIRWAY PRESSURE (CPAP): ICD-10-CM

## 2024-06-12 DIAGNOSIS — Z12.31 ENCOUNTER FOR SCREENING MAMMOGRAM FOR BREAST CANCER: ICD-10-CM

## 2024-06-12 DIAGNOSIS — E78.2 MIXED HYPERLIPIDEMIA: ICD-10-CM

## 2024-06-12 DIAGNOSIS — R73.03 PREDIABETES: ICD-10-CM

## 2024-06-12 DIAGNOSIS — E66.01 CLASS 3 SEVERE OBESITY DUE TO EXCESS CALORIES WITHOUT SERIOUS COMORBIDITY WITH BODY MASS INDEX (BMI) OF 50.0 TO 59.9 IN ADULT (HCC): Primary | ICD-10-CM

## 2024-06-12 DIAGNOSIS — I10 PRIMARY HYPERTENSION: ICD-10-CM

## 2024-06-12 DIAGNOSIS — F33.9 DEPRESSION, RECURRENT (HCC): ICD-10-CM

## 2024-06-12 PROCEDURE — 99214 OFFICE O/P EST MOD 30 MIN: CPT | Performed by: FAMILY MEDICINE

## 2024-06-12 RX ORDER — METFORMIN HYDROCHLORIDE 500 MG/1
500 TABLET, EXTENDED RELEASE ORAL
Qty: 90 TABLET | Refills: 2 | Status: SHIPPED | OUTPATIENT
Start: 2024-06-12 | End: 2024-06-17

## 2024-06-12 NOTE — PROGRESS NOTES
Ambulatory Visit  Name: Alejandrina Aranda      : 1980      MRN: [id number]  Encounter Provider: Iza Jeff DO  Encounter Date: 2024   Encounter department: [facility]    Assessment & Plan   1. Class 3 severe obesity due to excess calories without serious comorbidity with body mass index (BMI) of 50.0 to 59.9 in adult (HCC)  Assessment & Plan:  The patient has struggled with obesity for many years and has tried and failed multiple formal weight loss programs including weight watchers, she has failed exercise programs, and even went through the weight management program at [facility] with few results.  She is strongly interested in starting a medication like the GLP-1 agonist to help with weight loss.  There is no family history of medullary thyroid carcinoma or multiple endocrine neoplasia.  I discussed at length how the medication works and possible side effects including pancreatitis and abdominal discomfort along with nausea and vomiting.  We will order the medication for a titration up to the  maintenance dose.  We will help establish her with our clinical pharmacist for instruction on how to administer the medication and to aid with titration.  We will see her back in the office for follow-up.  The patient was given an opportunity to ask questions and was appreciative.  Orders:  -     Ambulatory referral to clinical pharmacy; Future  -     Semaglutide-Weight Management (WEGOVY) 0.25 MG/0.5ML; Inject 0.5 mL (0.25 mg total) under the skin once a week for 28 days  -     Semaglutide-Weight Management (WEGOVY) 0.5 MG/0.5ML; Inject 0.5 mL (0.5 mg total) under the skin once a week for 28 days Do not start before July 10, 2024.  -     Semaglutide-Weight Management (WEGOVY) 1 MG/0.5ML; Inject 0.5 mL (1 mg total) under the skin once a week for 28 days Do not start before 2024.  -     Semaglutide-Weight Management (WEGOVY) 1.7 MG/0.75ML; Inject 0.75 mL (1.7 mg total)  under the skin once a week for 28 days Do not start before September 4, 2024.  -     Semaglutide-Weight Management (WEGOVY) 2.4 MG/0.75ML; Inject 0.75 mL (2.4 mg total) under the skin once a week for 28 days Do not start before October 2, 2024.  2. Primary hypertension  Assessment & Plan:  The patient's blood pressure is well-controlled with her current medication.  She will continue to work on her diet and exercise and losing weight.  We will see her back in the office as scheduled.  Orders:  -     CBC and differential; Future  -     Comprehensive metabolic panel; Future  -     LDL cholesterol, direct; Future  -     Lipid panel; Future  -     TSH, 3rd generation with Free T4 reflex; Future  -     Hemoglobin A1C; Future  -     CBC and differential  -     Comprehensive metabolic panel  -     LDL cholesterol, direct  -     Lipid panel  -     TSH, 3rd generation with Free T4 reflex  -     Hemoglobin A1C  3. Prediabetes  Assessment & Plan:  The patient will continue with the current dose of the metformin.  We have made no changes today.  We will see her back as scheduled.  Orders:  -     metFORMIN (GLUCOPHAGE-XR) 500 mg 24 hr tablet; Take 1 tablet (500 mg total) by mouth daily with dinner  -     CBC and differential; Future  -     Comprehensive metabolic panel; Future  -     LDL cholesterol, direct; Future  -     Lipid panel; Future  -     TSH, 3rd generation with Free T4 reflex; Future  -     Hemoglobin A1C; Future  -     CBC and differential  -     Comprehensive metabolic panel  -     LDL cholesterol, direct  -     Lipid panel  -     TSH, 3rd generation with Free T4 reflex  -     Hemoglobin A1C  4. Obstructive sleep apnea treated with continuous positive airway pressure (CPAP)  Assessment & Plan:  The patient will continue with the CPAP and will continue to follow-up with her sleep specialist.  5. Depression, recurrent (HCC)  Assessment & Plan:  The patient will continue with the current dose of her sertraline.  We  have made no changes today.  We will continue to monitor closely.  We will see her back in the office as scheduled.  6. Generalized anxiety disorder  Assessment & Plan:  The patient's anxiety is well-controlled with the sertraline.  We have made no changes today.  7. Mixed hyperlipidemia  Assessment & Plan:  The patient will continue with the current dose of the rosuvastatin.  We have made no changes today.  Orders:  -     CBC and differential; Future  -     Comprehensive metabolic panel; Future  -     LDL cholesterol, direct; Future  -     Lipid panel; Future  -     TSH, 3rd generation with Free T4 reflex; Future  -     Hemoglobin A1C; Future  -     CBC and differential  -     Comprehensive metabolic panel  -     LDL cholesterol, direct  -     Lipid panel  -     TSH, 3rd generation with Free T4 reflex  -     Hemoglobin A1C  8. Encounter for screening mammogram for breast cancer  -     Mammo screening bilateral w 3d & cad; Future    Depression Screening and Follow-up Plan: Patient was screened for depression during today's encounter. They screened negative with a PHQ-9 score of 0.        History of Present Illness     Alejandrina Aranda is a 43 y.o. female who presents today for follow-up of her hypertension, depression, anxiety, and hyperlipidemia.  She is very concerned about her obesity and difficulty losing weight.  She is having a hard time losing weight and is hungry all the time.  She can't stop eating and has gained weight.  The patient denies any chest pain, shortness of breath, or palpitations.  There is no edema.  There are no headaches or visual changes.  There is no lightheadedness, dizziness, or fainting spells.  The patient currently denies any nausea, vomiting, or GERD symptoms.  she has normal bowel movements and normal urine output.  she has a normal appetite.  She has seen a nutritionist before and she did the weight management program with no results.  She has tried Weight Watcher's, Zoom and other  formal weight loss programs with no improvement.  She does exercise regularly.  She is never full and can't satisfy herself  There is no history of medullary thyroid cancer  There is no pancreatitis.    She is seeing GYN in August- she is requesting mammogram.  She is interested in possibly she is interested in possibly pursuing at this time.    Hypertension  This is a chronic problem. The current episode started more than 1 year ago. The problem is unchanged. The problem is controlled. Pertinent negatives include no anxiety, blurred vision, chest pain, headaches, malaise/fatigue, neck pain, orthopnea, palpitations, peripheral edema, PND, shortness of breath or sweats. There are no compliance problems.      Review of Systems   Constitutional: Negative.  Negative for malaise/fatigue.   HENT: Negative.     Eyes: Negative.  Negative for blurred vision.   Respiratory: Negative.  Negative for shortness of breath.    Cardiovascular: Negative.  Negative for chest pain, palpitations, orthopnea and PND.   Gastrointestinal: Negative.    Endocrine: Negative.    Genitourinary: Negative.    Musculoskeletal: Negative.  Negative for neck pain.   Skin: Negative.    Allergic/Immunologic: Negative.    Neurological: Negative.  Negative for headaches.   Hematological: Negative.    Psychiatric/Behavioral: Negative.       Past Medical History:   Diagnosis Date   • Allergic rhinitis    • Anxiety    • Depression    • Family health problem     mother and grandmother h/o blood clots after surgery   • Fibroid    • Fullness in ear, left     Resolved [id number]   • Hypertension     Resolved [id number]   • Sleep apnea    • Urinary tract infection     h/o     Past Surgical History:   Procedure Laterality Date   • CHOLECYSTECTOMY     • HYSTERECTOMY     • IN TOTAL ABDOMINAL HYSTERECT W/WO RMVL TUBE OVARY N/A 6/18/2019    Procedure: Supracervical IWONA, removal of bilat tubes, removal of IUD, cystoscopy;  Surgeon: Geovanni Segura MD;  Location: [facility];   Service: Gynecology   • TOOTH EXTRACTION       Family History   Problem Relation Age of Onset   • Anxiety disorder Mother    • Other Mother         Blood clots   • Hypertension Mother    • Hyperlipidemia Mother    • Alcohol abuse Father    • Hypertension Brother    • Anxiety disorder Maternal Grandmother    • Other Maternal Grandmother         Blood clots   • Lung cancer Maternal Grandfather    • No Known Problems Paternal Grandmother    • No Known Problems Paternal Grandfather    • Ovarian cancer Maternal Aunt 68   • Alcohol abuse Maternal Aunt 65   • Prostate cancer Maternal Uncle    • No Known Problems Paternal Aunt    • Heart disease Family         cardiac disorder   • Cancer Family         lung ca   • Diabetes Family    • Cancer Family      Social History     Tobacco Use   • Smoking status: Never     Passive exposure: Past   • Smokeless tobacco: Never   Vaping Use   • Vaping status: Never Used   Substance and Sexual Activity   • Alcohol use: Yes     Comment: rare   • Drug use: No   • Sexual activity: Yes     Partners: Male     Birth control/protection: Female Sterilization     Current Outpatient Medications on File Prior to Visit   Medication Sig   • acetaminophen (TYLENOL) 500 mg tablet Take 500 mg by mouth every 6 (six) hours as needed for mild pain As needed   • albuterol (PROVENTIL HFA,VENTOLIN HFA) 90 mcg/act inhaler Inhale 2 puffs every 6 (six) hours as needed for wheezing   • amLODIPine (NORVASC) 5 mg tablet TAKE 1 TABLET (5 MG TOTAL) BY MOUTH DAILY.   • Azelastine HCl 137 MCG/SPRAY SOLN 1-2 sprays twice a day   • calcium carbonate (OS-MAUREEN) 600 MG tablet Take 600 mg by mouth daily   • Cetirizine HCl 10 MG CAPS Take 1 capsule by mouth daily as needed    • ibuprofen (MOTRIN) 400 mg tablet Take 400 mg by mouth if needed   • LORazepam (ATIVAN) 0.5 mg tablet Take 1 tablet (0.5 mg total) by mouth every 6 (six) hours as needed for anxiety   • montelukast (SINGULAIR) 10 mg tablet TAKE 1 TABLET BY MOUTH DAILY AT  "BEDTIME   • Multiple Vitamin (MULTIVITAMIN PO) Take by mouth in the morning   • rosuvastatin (CRESTOR) 10 MG tablet TAKE 1 TABLET BY MOUTH EVERY DAY   • sertraline (Zoloft) 50 mg tablet Take 1 tablet (50 mg total) by mouth daily   • [DISCONTINUED] Diclofenac Sodium (VOLTAREN) 1 % Apply 2 g topically 4 (four) times a day (Patient not taking: Reported on 6/12/2024)   • [DISCONTINUED] Elastic Bandages & Supports (B & B Carpal Tunnel Brace) MISC Use daily Bilateral carpal tunnel wrist braces. (Patient not taking: Reported on 6/12/2024)   • [DISCONTINUED] EPINEPHrine (EPIPEN) 0.3 mg/0.3 mL SOAJ Inject 0.3 mL (0.3 mg total) into a muscle once for 1 dose (Patient not taking: Reported on 6/12/2024)   • [DISCONTINUED] guaifenesin-codeine (GUAIFENESIN AC) 100-10 MG/5ML liquid 5 mL q 6 hours or hs prn cough.  Home use only. (Patient not taking: Reported on 6/12/2024)   • [DISCONTINUED] meloxicam (MOBIC) 15 mg tablet TAKE 1 TABLET (15 MG TOTAL) BY MOUTH DAILY. (Patient not taking: Reported on 6/12/2024)   • [DISCONTINUED] Tuberculin-Allergy Syringes (ALLERGY SYRINGE 1CC/27GX1/2\") 27G X 1/2\" 1 ML MISC by Subcutaneous (multi inj) route once a week 1 box of 100 ct (Patient not taking: Reported on 6/12/2024)     Allergies   Allergen Reactions   • Pollen Extract Allergic Rhinitis     Immunization History   Administered Date(s) Administered   • COVID-19 PFIZER VACCINE 0.3 ML IM 03/08/2021, 04/05/2021, 11/26/2021   • COVID-19 Pfizer Vac BIVALENT Michael-sucrose 12 Yr+ IM 08/12/2023   • INFLUENZA 10/28/2018   • Influenza Quadrivalent, 6-35 Months IM 10/25/2016, 10/02/2017   • Influenza, injectable, quadrivalent, preservative free 0.5 mL 11/11/2019, 10/14/2020   • Influenza, recombinant, quadrivalent,injectable, preservative free 10/18/2022   • Tdap 11/12/2014   • Tuberculin Skin Test-PPD Intradermal 08/18/2015   • influenza, injectable, quadrivalent 11/03/2023     Objective     /78   Pulse 68   Temp 98 °F (36.7 °C) (Tympanic)   " "Resp 18   Ht 5' 7\" (1.702 m)   Wt (!) 146 kg (321 lb)   LMP 05/15/2019 (Approximate)   SpO2 97%   BMI 50.28 kg/m²     Physical Exam  Constitutional:       General: She is not in acute distress.     Appearance: Normal appearance. She is well-developed. She is not diaphoretic.   HENT:      Head: Normocephalic and atraumatic.      Right Ear: Tympanic membrane, ear canal and external ear normal.      Left Ear: Tympanic membrane, ear canal and external ear normal.      Nose: Nose normal.      Mouth/Throat:      Mouth: Mucous membranes are moist.      Pharynx: Oropharynx is clear. No oropharyngeal exudate.   Eyes:      General: No scleral icterus.        Right eye: No discharge.         Left eye: No discharge.      Extraocular Movements: Extraocular movements intact.      Pupils: Pupils are equal, round, and reactive to light.   Neck:      Thyroid: No thyromegaly.      Vascular: No JVD.      Trachea: No tracheal deviation.   Cardiovascular:      Rate and Rhythm: Normal rate and regular rhythm.      Heart sounds: Normal heart sounds. No murmur heard.     No friction rub. No gallop.   Pulmonary:      Effort: Pulmonary effort is normal. No respiratory distress.      Breath sounds: Normal breath sounds. No wheezing or rales.   Chest:      Chest wall: No tenderness.   Abdominal:      General: Bowel sounds are normal. There is no distension.      Palpations: Abdomen is soft. There is no mass.      Tenderness: There is no abdominal tenderness. There is no guarding or rebound.      Hernia: No hernia is present.   Musculoskeletal:         General: No tenderness or deformity. Normal range of motion.      Cervical back: Normal range of motion and neck supple.   Lymphadenopathy:      Cervical: No cervical adenopathy.   Skin:     General: Skin is warm and dry.      Coloration: Skin is not pale.      Findings: No erythema or rash.   Neurological:      Mental Status: She is alert and oriented to person, place, and time.      " Cranial Nerves: No cranial nerve deficit.      Sensory: No sensory deficit.      Motor: No abnormal muscle tone.      Coordination: Coordination normal.      Deep Tendon Reflexes: Reflexes normal.   Psychiatric:         Mood and Affect: Mood normal.         Behavior: Behavior normal.         Thought Content: Thought content normal.         Judgment: Judgment normal.       Administrative Statements   I have spent a total time of 20 minutes on 06/14/24 In caring for this patient including Diagnostic results, Risks and benefits of tx options, Instructions for management, Importance of tx compliance, Counseling / Coordination of care, Documenting in the medical record, Reviewing / ordering tests, medicine, procedures  , and Obtaining or reviewing history  .

## 2024-06-14 NOTE — ASSESSMENT & PLAN NOTE
The patient will continue with the current dose of her sertraline.  We have made no changes today.  We will continue to monitor closely.  We will see her back in the office as scheduled.

## 2024-06-14 NOTE — ASSESSMENT & PLAN NOTE
The patient will continue with the current dose of the rosuvastatin.  We have made no changes today.

## 2024-06-14 NOTE — ASSESSMENT & PLAN NOTE
The patient has struggled with obesity for many years and has tried and failed multiple formal weight loss programs including weight watchers, she has failed exercise programs, and even went through the weight management program at [facility] with few results.  She is strongly interested in starting a medication like the GLP-1 agonist to help with weight loss.  There is no family history of medullary thyroid carcinoma or multiple endocrine neoplasia.  I discussed at length how the medication works and possible side effects including pancreatitis and abdominal discomfort along with nausea and vomiting.  We will order the medication for a titration up to the  maintenance dose.  We will help establish her with our clinical pharmacist for instruction on how to administer the medication and to aid with titration.  We will see her back in the office for follow-up.  The patient was given an opportunity to ask questions and was appreciative.

## 2024-06-17 ENCOUNTER — CLINICAL SUPPORT (OUTPATIENT)
Dept: FAMILY MEDICINE CLINIC | Facility: CLINIC | Age: 44
End: 2024-06-17

## 2024-06-17 DIAGNOSIS — E66.01 CLASS 3 SEVERE OBESITY DUE TO EXCESS CALORIES WITHOUT SERIOUS COMORBIDITY WITH BODY MASS INDEX (BMI) OF 50.0 TO 59.9 IN ADULT (HCC): Primary | ICD-10-CM

## 2024-06-17 RX ORDER — TOPIRAMATE 50 MG/1
TABLET, FILM COATED ORAL
Qty: 360 TABLET | Refills: 2 | Status: SHIPPED | OUTPATIENT
Start: 2024-06-17

## 2024-06-17 NOTE — PROGRESS NOTES
[facility] Clinical Integration Pharmacy Services  José Miguel Ivey PharmD      Alejandrina Aranda is a 43 y.o. female who was referred to the pharmacist for obesity and weight loss education and management, referred by Iza Jeff DO .    This virtual check-in was done via telephone.   Encounter provider: Miranda Murillo    Patient agrees to participate in a virtual check in via telephone or video visit instead of presenting to the office to address urgent/immediate medical needs.     After connecting, the patient was identified by name and date of birth.  Alejandrina Aranda was informed that this was a telemedicine visit and that the exam was being conducted via the [employer] Teams platform. She agrees to proceed. Patient [place] located in the [facility] [place] in which I hold an active license ([place] My office door was closed. No one else was in the room.  Alejandrina Aranda acknowledged consent and understanding of privacy and security of the telemedicine visit.  I informed the patient that I have reviewed her record in Epic and presented the opportunity for her to ask any questions regarding the visit today. The patient agreed to participate.        Assessment/Plan:     Obesity   Obesity - Stage 1, based on BMI of50.28 kg/m² with complications  Goals: Achieve weight loss sufficient to ameliorate obesity-related complications and prevent further deterioration   Recommended treatment: Lifestyle/behavioral therapy and add pharmacotherapy per AACE/ACE guidelines.    Contraindications to weight loss: none  Patient readiness to commit to diet and activity changes: good  Barriers to weight loss: none    The following actions were taken today by the Clinical Pharmacist:   1.  Verified formulary status of Wegovy  2. Provided metformin and topiramate education to patient. Discussed mechanism of action, potential side effects, expected outcomes, and proper administration.  Answered patient's questions.    Interventions:  "Recommend provider please consider the following, if in agreeance  Increase metformin to 1,000 mg BID.    - This dose has shown benefit for weight maintenance/loss  Start topiramate 50 mg daily, titrate by 50mg/week to target dose of 100 mg BID as tolerated  Will pend Rx for PCP signature/concurrence     Follow-up:   Follow-up with pharmacist in 4 weeks  Next PCP visit: 9/19/2024    - Home Monitoring Records: food and nutrition intake and weight       Subjective:     Patient recently prescribed Wegovy by PCP to assist with weight loss. Unfortunately, her prescription drug plan excludes all medications FDA approved for weight loss.     Patient is currently taking metformin  mg daily for prediabetes. She denies any GI side effects.    Obesity History (office scale)  Baseline Weight: 321 lbs  Most recent Weight: 6/12/2024    Weight-related complications: prediabetes, dyslipidemia, HTN, and SID    Previous weight loss medication  Currently taking metformin  mg daily    Patient is not currently pregnant, breastfeeding, or planning to become pregnant in the near future. Has had total hysterectomy.     Patient denies history of seizures, anorexia, or bulimia.    She dienes any history of kidney stones.      Drug interactions: Pharmacokinetic studies indicate that metformin may increase the serum concentration of topiramate and topiramate may increase the serum concentration of metformin. However,  the clinical significance of these PK effects is unknown. In studies evaluating coadministration of metformin and topiramate for the treatment of obesity, increases in lactic acidosis have not been described.    Objective:     Vitals:  Estimated body mass index is 50.28 kg/m² as calculated from the following:    Height as of 6/12/24: 5' 7\" (1.702 m).    Weight as of 6/12/24: 146 kg (321 lb).    Wt Readings from Last 3 Encounters:   06/12/24 (!) 146 kg (321 lb)   01/15/24 (!) 144 kg (317 lb)   12/18/23 (!) 145 kg (320 " lb)     BP Readings from Last 1 Encounters:   06/12/24 124/78     Pulse Readings from Last 1 Encounters:   06/12/24 68     Labs:  Lab Results   Component Value Date    SODIUM 139 01/11/2024    K 4.2 01/11/2024     01/11/2024    CO2 24 01/11/2024    BUN 9 01/11/2024    CREATININE 0.60 01/11/2024    EGFR 113 01/11/2024    GLUC 110 (H) 01/11/2024     Lab Results   Component Value Date    HGBA1C 6.3 (H) 01/11/2024    HGBA1C 6.3 (H) 09/01/2023     Pharmacist Tracking Tool:     Pharmacist Tracking Tool  Reason For Outreach: Embedded Pharmacist  Demographics:  Intervention Method: Phone  Type of Intervention: New  Topics Addressed: Obesity  Pharmacologic Interventions: Medication Initiation and Dose or Frequency Adjusted  Non-Pharmacologic Interventions: Care coordination and Medication Monitoring  Time:  Direct Patient Care:  20  mins  Care Coordination:  20  mins  Recommendation Recipient: Patient/Caregiver and Provider  Outcome: Pending/ Follow-up Required      José Miguel Ivey PharmD  Clinical Integration Pharmacist  [facility]  [phone]

## 2024-06-19 ENCOUNTER — HOSPITAL ENCOUNTER (OUTPATIENT)
Dept: MAMMOGRAPHY | Facility: CLINIC | Age: 44
Discharge: HOME | End: 2024-06-19
Payer: COMMERCIAL

## 2024-06-19 VITALS — BODY MASS INDEX: 45.99 KG/M2 | HEIGHT: 67 IN | WEIGHT: 293 LBS

## 2024-06-19 DIAGNOSIS — Z12.31 ENCOUNTER FOR SCREENING MAMMOGRAM FOR BREAST CANCER: ICD-10-CM

## 2024-06-19 PROCEDURE — 77067 SCR MAMMO BI INCL CAD: CPT

## 2024-06-19 PROCEDURE — 77063 BREAST TOMOSYNTHESIS BI: CPT

## 2024-06-28 DIAGNOSIS — F33.9 DEPRESSION, RECURRENT (HCC): ICD-10-CM

## 2024-06-28 DIAGNOSIS — F41.1 GENERALIZED ANXIETY DISORDER: ICD-10-CM

## 2024-06-28 DIAGNOSIS — E78.2 MIXED HYPERLIPIDEMIA: ICD-10-CM

## 2024-06-28 RX ORDER — ROSUVASTATIN CALCIUM 10 MG/1
TABLET, COATED ORAL
Qty: 90 TABLET | Refills: 1 | Status: SHIPPED | OUTPATIENT
Start: 2024-06-28

## 2024-07-06 LAB
ALBUMIN SERPL-MCNC: 4.3 G/DL (ref 3.9–4.9)
ALP SERPL-CCNC: 86 IU/L (ref 44–121)
ALT SERPL-CCNC: 11 IU/L (ref 0–32)
AST SERPL-CCNC: 16 IU/L (ref 0–40)
BASOPHILS # BLD AUTO: 0 X10E3/UL (ref 0–0.2)
BASOPHILS NFR BLD AUTO: 0 %
BILIRUB SERPL-MCNC: 0.4 MG/DL (ref 0–1.2)
BUN SERPL-MCNC: 13 MG/DL (ref 6–24)
BUN/CREAT SERPL: 19 (ref 9–23)
CALCIUM SERPL-MCNC: 9.3 MG/DL (ref 8.7–10.2)
CHLORIDE SERPL-SCNC: 107 MMOL/L (ref 96–106)
CHOLEST SERPL-MCNC: 140 MG/DL (ref 100–199)
CHOLEST/HDLC SERPL: 2.8 RATIO (ref 0–4.4)
CO2 SERPL-SCNC: 21 MMOL/L (ref 20–29)
CREAT SERPL-MCNC: 0.7 MG/DL (ref 0.57–1)
EGFR: 108 ML/MIN/1.73
EOSINOPHIL # BLD AUTO: 0.1 X10E3/UL (ref 0–0.4)
EOSINOPHIL NFR BLD AUTO: 1 %
ERYTHROCYTE [DISTWIDTH] IN BLOOD BY AUTOMATED COUNT: 13.7 % (ref 11.7–15.4)
EST. AVERAGE GLUCOSE BLD GHB EST-MCNC: 134 MG/DL
GLOBULIN SER-MCNC: 2.3 G/DL (ref 1.5–4.5)
GLUCOSE SERPL-MCNC: 107 MG/DL (ref 70–99)
HBA1C MFR BLD: 6.3 % (ref 4.8–5.6)
HCT VFR BLD AUTO: 41.6 % (ref 34–46.6)
HDLC SERPL-MCNC: 50 MG/DL
HGB BLD-MCNC: 13.2 G/DL (ref 11.1–15.9)
IMM GRANULOCYTES # BLD: 0 X10E3/UL (ref 0–0.1)
IMM GRANULOCYTES NFR BLD: 0 %
LDL CALC COMMENT: ABNORMAL
LDLC SERPL CALC-MCNC: 52 MG/DL (ref 0–99)
LDLC SERPL DIRECT ASSAY-MCNC: 59 MG/DL (ref 0–99)
LYMPHOCYTES # BLD AUTO: 1.7 X10E3/UL (ref 0.7–3.1)
LYMPHOCYTES NFR BLD AUTO: 23 %
MCH RBC QN AUTO: 27.6 PG (ref 26.6–33)
MCHC RBC AUTO-ENTMCNC: 31.7 G/DL (ref 31.5–35.7)
MCV RBC AUTO: 87 FL (ref 79–97)
MONOCYTES # BLD AUTO: 0.4 X10E3/UL (ref 0.1–0.9)
MONOCYTES NFR BLD AUTO: 6 %
NEUTROPHILS # BLD AUTO: 5.2 X10E3/UL (ref 1.4–7)
NEUTROPHILS NFR BLD AUTO: 70 %
PLATELET # BLD AUTO: 313 X10E3/UL (ref 150–450)
POTASSIUM SERPL-SCNC: 4.2 MMOL/L (ref 3.5–5.2)
PROT SERPL-MCNC: 6.6 G/DL (ref 6–8.5)
RBC # BLD AUTO: 4.79 X10E6/UL (ref 3.77–5.28)
SL AMB VLDL CHOLESTEROL CALC: 38 MG/DL (ref 5–40)
SODIUM SERPL-SCNC: 140 MMOL/L (ref 134–144)
TRIGL SERPL-MCNC: 240 MG/DL (ref 0–149)
TSH SERPL DL<=0.005 MIU/L-ACNC: 2.88 UIU/ML (ref 0.45–4.5)
WBC # BLD AUTO: 7.5 X10E3/UL (ref 3.4–10.8)

## 2024-07-18 ENCOUNTER — PATIENT MESSAGE (OUTPATIENT)
Dept: FAMILY MEDICINE CLINIC | Facility: CLINIC | Age: 44
End: 2024-07-18

## 2024-08-01 NOTE — PATIENT COMMUNICATION
[facility] Integration Pharmacy Services  José Miguel Ivey PharmD     Communication with patient: Rinku adair     Reason for documentation: follow-up    Recommendations:     Patient showing positive response to medications with weight loss and decreased appetite.    Continue to monitor weight and dietary intake    Follow-up:   Follow-up with pharmacist PRN  Next PCP visit: 9/19/2024 (around 12 weeks)    Findings:      Patient reports adherence to metformin and topiramate without significant side effects.  No new symptoms reported. Nausea experienced initially but this has resolved    Patient reports a weight loss of 6 lbs since the last visit.  Patient initially experienced nausea, which has since subsided.  Patient reports a decrease in appetite and is excited about the progress.  Patient is ensuring adequate food intake and listening to her body.    Current Relevant Meds:  Metformin 1,000 mg BID  Topiramate 100 mg BID    Pharmacist Tracking Tool:     Reason For Outreach: Embedded Pharmacist  Demographics:  Intervention Method: Mychart Message  Type of Intervention: Follow-Up  Topics Addressed: Obesity  Pharmacologic Interventions: Med Rec  Non-Pharmacologic Interventions: Medication Monitoring  Time:  Direct Patient Care:  0  mins  Care Coordination:  25  mins  Recommendation Recipient: Patient/Caregiver  Outcome: Accepted

## 2024-08-16 ENCOUNTER — ANNUAL EXAM (OUTPATIENT)
Dept: GYNECOLOGY | Facility: CLINIC | Age: 44
End: 2024-08-16
Payer: COMMERCIAL

## 2024-08-16 VITALS
HEIGHT: 68 IN | WEIGHT: 293 LBS | DIASTOLIC BLOOD PRESSURE: 80 MMHG | BODY MASS INDEX: 44.41 KG/M2 | SYSTOLIC BLOOD PRESSURE: 128 MMHG

## 2024-08-16 DIAGNOSIS — Z11.51 SCREENING FOR HUMAN PAPILLOMAVIRUS (HPV): ICD-10-CM

## 2024-08-16 DIAGNOSIS — Z01.419 WOMEN'S ANNUAL ROUTINE GYNECOLOGICAL EXAMINATION: Primary | ICD-10-CM

## 2024-08-16 DIAGNOSIS — Z12.31 ENCOUNTER FOR SCREENING MAMMOGRAM FOR BREAST CANCER: ICD-10-CM

## 2024-08-16 DIAGNOSIS — N95.1 PERIMENOPAUSE: ICD-10-CM

## 2024-08-16 DIAGNOSIS — N95.2 VAGINAL ATROPHY: ICD-10-CM

## 2024-08-16 PROCEDURE — S0612 ANNUAL GYNECOLOGICAL EXAMINA: HCPCS | Performed by: OBSTETRICS & GYNECOLOGY

## 2024-08-16 RX ORDER — KETOCONAZOLE 20 MG/ML
SHAMPOO TOPICAL
COMMUNITY
Start: 2024-06-20

## 2024-08-16 NOTE — PROGRESS NOTES
Assessment & Plan   Diagnoses and all orders for this visit:    Women's annual routine gynecological examination    Encounter for screening mammogram for breast cancer  -     Mammo screening bilateral w 3d & cad; Future    Vaginal atrophy    Perimenopause    Other orders  -     ketoconazole (NIZORAL) 2 % shampoo; LATHER ON SCALP, LET IT SIT FOR 2 TO 5 MINUTES, THEN RINSE. USE 3 TIMES A WEEK    1.  Yearly exam-Pap smear done with HPV testing, self breast awareness reviewed, calcium/vitamin D recommendations discussed, mammogram request given, colon cancer screening reviewed.  Patient states [place] came to her house recently, ordered by primary doctor to her recollection.  She will get this done, counseled about 92% sensitivity versus colonoscopy with 3-year duration.  2. status post laparotomy with supracervical hysterectomy-780 g uterus with fibroids removed at surgery 6/18/2019.  761 g uterus with fibroids at pathology.  Excellent healing is noted, patient is very happy.  3.  Previous history of anemia-resolved with hysterectomy  4.  History of IUD/pelvic cramping/left adnexal tenderness/irregular bleeding-resolved and surgery.  She has had no bleeding since supracervical hysterectomy.  5. vaginal dryness-does note this from time to time.  Uses lubrication with good results.  Vaginal vocation/moisturizer sheet given.  6.  Elevated BMI  7.  Prior elevated diastolic blood pressure-normal today.  He is on blood pressure medicine as per treating doctor.  8.  Recent diagnosis of asthma-continues to follow-up with allergy specialist.  9.  Other-continues to teach high school chemistry, physics, and bioethics including AP courses.  10.  Perimenopause-does note intermittent hot flushes.  Practical recommendations were reviewed.  Follow-up 1 year for yearly exam or as needed.    Subjective   Patient ID: Alejandrina Aranda is a 44 y.o. female.    Vitals:    08/16/24 0934   BP: 128/80     Patient was seen today for yearly exam.   Please see assessment plan for details.        The following portions of the patient's history were reviewed and updated as appropriate: allergies, current medications, past family history, past medical history, past social history, past surgical history, and problem list.  Past Medical History:   Diagnosis Date    Allergic rhinitis     Anxiety     Depression     Family health problem     mother and grandmother h/o blood clots after surgery    Fibroid     Fullness in ear, left     Resolved [id number]    Hypertension     Resolved [id number]    Sleep apnea     Urinary tract infection     h/o     Past Surgical History:   Procedure Laterality Date    CHOLECYSTECTOMY      HYSTERECTOMY      MN TOTAL ABDOMINAL HYSTERECT W/WO RMVL TUBE OVARY N/A 2019    Procedure: Supracervical IWONA, removal of bilat tubes, removal of IUD, cystoscopy;  Surgeon: Geovanni Segura MD;  Location: [facility];  Service: Gynecology    TOOTH EXTRACTION       OB History    Para Term  AB Living   0 0 0 0 0 0   SAB IAB Ectopic Multiple Live Births   0 0 0 0 0       Current Outpatient Medications:     acetaminophen (TYLENOL) 500 mg tablet, Take 500 mg by mouth every 6 (six) hours as needed for mild pain As needed, Disp: , Rfl:     albuterol (ProAir HFA) 90 mcg/act inhaler, Inhale 2 puffs every 4 (four) hours as needed for wheezing, Disp: 18 g, Rfl: 3    amLODIPine (NORVASC) 5 mg tablet, TAKE 1 TABLET (5 MG TOTAL) BY MOUTH DAILY., Disp: 90 tablet, Rfl: 2    Azelastine HCl 137 MCG/SPRAY SOLN, 1-2 sprays twice a day, Disp: 90 mL, Rfl: 3    budesonide-formoterol (Symbicort) 80-4.5 MCG/ACT inhaler, Inhale 2 puffs 2 (two) times a day Rinse mouth after use., Disp: 10.2 g, Rfl: 5    calcium carbonate (OS-MAUREEN) 600 MG tablet, Take 600 mg by mouth daily, Disp: , Rfl:     Cetirizine HCl 10 MG CAPS, Take 1 capsule by mouth daily as needed , Disp: , Rfl:     ibuprofen (MOTRIN) 400 mg tablet, Take 400 mg by mouth if needed, Disp: , Rfl:     ketoconazole  (NIZORAL) 2 % shampoo, LATHER ON SCALP, LET IT SIT FOR 2 TO 5 MINUTES, THEN RINSE. USE 3 TIMES A WEEK, Disp: , Rfl:     LORazepam (ATIVAN) 0.5 mg tablet, Take 1 tablet (0.5 mg total) by mouth every 6 (six) hours as needed for anxiety, Disp: 30 tablet, Rfl: 0    metFORMIN (GLUCOPHAGE) 1000 MG tablet, Take 1 tab QAM x 7 days, then 1 tab QAM + 0.5 QPM x 7 days, then 1 tab BID, Disp: 200 tablet, Rfl: 3    mometasone (NASONEX) 50 mcg/act nasal spray, 2 sprays into each nostril daily, Disp: 17 g, Rfl: 5    montelukast (SINGULAIR) 10 mg tablet, Take 1 tablet (10 mg total) by mouth daily at bedtime, Disp: 90 tablet, Rfl: 3    Multiple Vitamin (MULTIVITAMIN PO), Take by mouth in the morning, Disp: , Rfl:     rosuvastatin (CRESTOR) 10 MG tablet, TAKE 1 TABLET BY MOUTH EVERY DAY, Disp: 90 tablet, Rfl: 1    sertraline (ZOLOFT) 50 mg tablet, TAKE 1 TABLET BY MOUTH EVERY DAY, Disp: 90 tablet, Rfl: 1    Spacer/Aero-Holding Chambers (Vortex Valved Holding Chamber) HARMAN, Use 2 (two) times a day, Disp: 1 each, Rfl: 0    topiramate (Topamax) 50 MG tablet, Take 1 tab QAM x 7 days, then 1 tab BID x 7 days, then 2 tab QAM + 1 tab QPM x 7 days, then 2 tab BID, Disp: 360 tablet, Rfl: 2    albuterol (PROVENTIL HFA,VENTOLIN HFA) 90 mcg/act inhaler, Inhale 2 puffs every 6 (six) hours as needed for wheezing (Patient not taking: Reported on 7/1/2024), Disp: 18 g, Rfl: 0  Allergies   Allergen Reactions    Pollen Extract Allergic Rhinitis     Social History     Socioeconomic History    Marital status: /Civil Union     Spouse name: None    Number of children: None    Years of education: None    Highest education level: None   Occupational History    None   Tobacco Use    Smoking status: Never     Passive exposure: Past    Smokeless tobacco: Never   Vaping Use    Vaping status: Never Used   Substance and Sexual Activity    Alcohol use: Yes     Comment: rare    Drug use: No    Sexual activity: Yes     Partners: Male     Birth  control/protection: Female Sterilization   Other Topics Concern    None   Social History Narrative    Always uses seat belt    Caffeine use    Hindu Zoroastrianism ([employer])        Do you have pets? dog Is pet allowed in bedroom?Yes    Are you a smoker? Never    Does anyone smoke in your home? No       Do you live with smokers? No    [place] frequently? No   How many times a year? N/A      Social Determinants of Health     Financial Resource Strain: Low Risk  (1/18/2021)    Overall Financial Resource Strain (CARDIA)     Difficulty of Paying Living Expenses: Not hard at all   Food Insecurity: No Food Insecurity (1/18/2021)    Hunger Vital Sign     Worried About Running Out of Food in the Last Year: Never true     Ran Out of Food in the Last Year: Never true   Transportation Needs: No Transportation Needs (1/18/2021)    PRAPARE - Transportation     Lack of Transportation (Medical): No     Lack of Transportation (Non-Medical): No   Physical Activity: Inactive (9/7/2022)    Exercise Vital Sign     Days of Exercise per Week: 0 days     Minutes of Exercise per Session: 0 min   Stress: No Stress Concern Present (9/7/2022)    [employer] [place] [employer] - Occupational Stress Questionnaire     Feeling of Stress : Only a little   Social Connections: Socially Integrated (4/20/2021)    Social Connection and Isolation Panel [NHANES]     Frequency of Communication with Friends and Family: More than three times a week     Frequency of Social Gatherings with Friends and Family: Once a week     Attends Faith Services: More than 4 times per year     Active Member of Clubs or Organizations: Yes     Attends Club or Organization Meetings: More than 4 times per year     Marital Status:    Intimate Partner Violence: Not At Risk (12/11/2023)    Humiliation, Afraid, Rape, and Kick questionnaire     Fear of Current or Ex-Partner: No     Emotionally Abused: No     Physically Abused: No     Sexually Abused:  "No   Housing Stability: Not on file     Family History   Problem Relation Age of Onset    Anxiety disorder Mother     Other Mother         Blood clots    Hypertension Mother     Hyperlipidemia Mother     Alcohol abuse Father     Hypertension Brother     Anxiety disorder Maternal Grandmother     Other Maternal Grandmother         Blood clots    Lung cancer Maternal Grandfather     No Known Problems Paternal Grandmother     No Known Problems Paternal Grandfather     Ovarian cancer Maternal Aunt 68    Alcohol abuse Maternal Aunt 65    Prostate cancer Maternal Uncle     No Known Problems Paternal Aunt     Heart disease Family         cardiac disorder    Cancer Family         lung ca    Diabetes Family     Cancer Family        Review of Systems   Constitutional:  Negative for chills, diaphoresis, fatigue and fever.   Respiratory:  Negative for apnea, cough, chest tightness, shortness of breath and wheezing.    Cardiovascular:  Negative for chest pain, palpitations and leg swelling.   Gastrointestinal:  Negative for abdominal distention, abdominal pain, anal bleeding, constipation, diarrhea, nausea, rectal pain and vomiting.   Genitourinary:  Negative for difficulty urinating, dyspareunia, dysuria, frequency, hematuria, menstrual problem, pelvic pain, urgency, vaginal bleeding, vaginal discharge and vaginal pain.   Musculoskeletal:  Negative for arthralgias, back pain and myalgias.   Skin:  Negative for color change and rash.   Neurological:  Negative for dizziness, syncope, light-headedness, numbness and headaches.   Hematological:  Negative for adenopathy. Does not bruise/bleed easily.   Psychiatric/Behavioral:  Negative for dysphoric mood and sleep disturbance. The patient is not nervous/anxious.        Objective   Physical Exam  OBGyn Exam     Objective      /80 (BP Location: Right arm, Patient Position: Sitting)   Ht 5' 7.5\" (1.715 m)   Wt (!) 143 kg (314 lb 6.4 oz)   LMP 05/15/2019 (Approximate)   BMI " 48.52 kg/m²     General:   alert and oriented, in no acute distress   Neck: normal to inspection and palpation   Breast: normal appearance, no masses or tenderness   Heart:    Lungs:    Abdomen: soft, non-tender, without masses or organomegaly   Vulva: normal   Vagina: Without erythema or lesions or discharge.  Normal.  Mildly atrophic   Cervix: Without lesions or discharge or cervicitis.  No Cervical motion tenderness.  Mildly atrophic   Uterus: surgically absent   Adnexa: no mass, fullness, tenderness   Rectum: negative    Psych:  Normal mood and affect   Skin:  Without obvious lesions   Eyes: symmetric, with normal movements and reactivity   Musculoskeletal:  Normal muscle tone and movements appreciated

## 2024-08-23 LAB
CYTOLOGIST CVX/VAG CYTO: NORMAL
DX ICD CODE: NORMAL
HPV GENOTYPE REFLEX: NORMAL
HPV I/H RISK 4 DNA CVX QL PROBE+SIG AMP: NEGATIVE
OTHER STN SPEC: NORMAL
PATH REPORT.FINAL DX SPEC: NORMAL
SL AMB NOTE:: NORMAL
SL AMB SPECIMEN ADEQUACY: NORMAL
SL AMB TEST METHODOLOGY: NORMAL

## 2024-09-13 ENCOUNTER — TELEPHONE (OUTPATIENT)
Age: 44
End: 2024-09-13

## 2024-09-13 NOTE — TELEPHONE ENCOUNTER
Pt called to reschedule 3 mo f/u appt on 9/19 w/ Dr. Jeff and next available wasn't until January. Rescheduled for 1/22/25 but pt asked to please be put on wait/cancellation list for anything that opens up before than in the late afternoon appt times. Please be advised.

## 2024-09-27 ENCOUNTER — OFFICE VISIT (OUTPATIENT)
Dept: URGENT CARE | Facility: CLINIC | Age: 44
End: 2024-09-27
Payer: COMMERCIAL

## 2024-09-27 ENCOUNTER — HOSPITAL ENCOUNTER (EMERGENCY)
Facility: HOSPITAL | Age: 44
Discharge: HOME | End: 2024-09-27
Attending: EMERGENCY MEDICINE
Payer: COMMERCIAL

## 2024-09-27 ENCOUNTER — APPOINTMENT (EMERGENCY)
Dept: RADIOLOGY | Facility: HOSPITAL | Age: 44
End: 2024-09-27
Payer: COMMERCIAL

## 2024-09-27 VITALS
TEMPERATURE: 99.2 F | HEIGHT: 67 IN | OXYGEN SATURATION: 98 % | BODY MASS INDEX: 45.99 KG/M2 | WEIGHT: 293 LBS | RESPIRATION RATE: 16 BRPM | HEART RATE: 90 BPM | DIASTOLIC BLOOD PRESSURE: 82 MMHG | SYSTOLIC BLOOD PRESSURE: 130 MMHG

## 2024-09-27 VITALS
RESPIRATION RATE: 22 BRPM | OXYGEN SATURATION: 98 % | DIASTOLIC BLOOD PRESSURE: 77 MMHG | TEMPERATURE: 97.7 F | HEART RATE: 56 BPM | SYSTOLIC BLOOD PRESSURE: 134 MMHG

## 2024-09-27 DIAGNOSIS — R06.02 SOB (SHORTNESS OF BREATH): Primary | ICD-10-CM

## 2024-09-27 DIAGNOSIS — R06.09 DYSPNEA ON EXERTION: Primary | ICD-10-CM

## 2024-09-27 DIAGNOSIS — R06.02 SHORTNESS OF BREATH: ICD-10-CM

## 2024-09-27 LAB
ALBUMIN SERPL BCG-MCNC: 4.3 G/DL (ref 3.5–5)
ALP SERPL-CCNC: 65 U/L (ref 34–104)
ALT SERPL W P-5'-P-CCNC: 10 U/L (ref 7–52)
ANION GAP SERPL CALCULATED.3IONS-SCNC: 8 MMOL/L (ref 4–13)
AST SERPL W P-5'-P-CCNC: 12 U/L (ref 13–39)
ATRIAL RATE: 64 BPM
BASOPHILS # BLD AUTO: 0.04 THOUSANDS/ΜL (ref 0–0.1)
BASOPHILS NFR BLD AUTO: 0 % (ref 0–1)
BILIRUB SERPL-MCNC: 0.28 MG/DL (ref 0.2–1)
BNP SERPL-MCNC: 19 PG/ML (ref 0–100)
BUN SERPL-MCNC: 14 MG/DL (ref 5–25)
CALCIUM SERPL-MCNC: 9.2 MG/DL (ref 8.4–10.2)
CARDIAC TROPONIN I PNL SERPL HS: <2 NG/L
CARDIAC TROPONIN I PNL SERPL HS: <2 NG/L
CHLORIDE SERPL-SCNC: 110 MMOL/L (ref 96–108)
CO2 SERPL-SCNC: 22 MMOL/L (ref 21–32)
CREAT SERPL-MCNC: 0.82 MG/DL (ref 0.6–1.3)
D DIMER PPP FEU-MCNC: 0.43 UG/ML FEU
EOSINOPHIL # BLD AUTO: 0 THOUSAND/ΜL (ref 0–0.61)
EOSINOPHIL NFR BLD AUTO: 0 % (ref 0–6)
ERYTHROCYTE [DISTWIDTH] IN BLOOD BY AUTOMATED COUNT: 13.9 % (ref 11.6–15.1)
GFR SERPL CREATININE-BSD FRML MDRD: 86 ML/MIN/1.73SQ M
GLUCOSE SERPL-MCNC: 93 MG/DL (ref 65–140)
HCT VFR BLD AUTO: 40.7 % (ref 34.8–46.1)
HGB BLD-MCNC: 13 G/DL (ref 11.5–15.4)
IMM GRANULOCYTES # BLD AUTO: 0.02 THOUSAND/UL (ref 0–0.2)
IMM GRANULOCYTES NFR BLD AUTO: 0 % (ref 0–2)
LYMPHOCYTES # BLD AUTO: 2.76 THOUSANDS/ΜL (ref 0.6–4.47)
LYMPHOCYTES NFR BLD AUTO: 27 % (ref 14–44)
MCH RBC QN AUTO: 27.8 PG (ref 26.8–34.3)
MCHC RBC AUTO-ENTMCNC: 31.9 G/DL (ref 31.4–37.4)
MCV RBC AUTO: 87 FL (ref 82–98)
MONOCYTES # BLD AUTO: 0.67 THOUSAND/ΜL (ref 0.17–1.22)
MONOCYTES NFR BLD AUTO: 7 % (ref 4–12)
NEUTROPHILS # BLD AUTO: 6.75 THOUSANDS/ΜL (ref 1.85–7.62)
NEUTS SEG NFR BLD AUTO: 66 % (ref 43–75)
NRBC BLD AUTO-RTO: 0 /100 WBCS
P AXIS: 32 DEGREES
PLATELET # BLD AUTO: 318 THOUSANDS/UL (ref 149–390)
PMV BLD AUTO: 9.4 FL (ref 8.9–12.7)
POTASSIUM SERPL-SCNC: 3.5 MMOL/L (ref 3.5–5.3)
PR INTERVAL: 170 MS
PROT SERPL-MCNC: 7.2 G/DL (ref 6.4–8.4)
QRS AXIS: 22 DEGREES
QRSD INTERVAL: 96 MS
QT INTERVAL: 418 MS
QTC INTERVAL: 431 MS
RBC # BLD AUTO: 4.68 MILLION/UL (ref 3.81–5.12)
SODIUM SERPL-SCNC: 140 MMOL/L (ref 135–147)
T WAVE AXIS: 10 DEGREES
VENTRICULAR RATE: 64 BPM
WBC # BLD AUTO: 10.24 THOUSAND/UL (ref 4.31–10.16)

## 2024-09-27 PROCEDURE — 80053 COMPREHEN METABOLIC PANEL: CPT

## 2024-09-27 PROCEDURE — 36415 COLL VENOUS BLD VENIPUNCTURE: CPT

## 2024-09-27 PROCEDURE — 84484 ASSAY OF TROPONIN QUANT: CPT

## 2024-09-27 PROCEDURE — 85025 COMPLETE CBC W/AUTO DIFF WBC: CPT

## 2024-09-27 PROCEDURE — 93010 ELECTROCARDIOGRAM REPORT: CPT | Performed by: INTERNAL MEDICINE

## 2024-09-27 PROCEDURE — 85379 FIBRIN DEGRADATION QUANT: CPT | Performed by: EMERGENCY MEDICINE

## 2024-09-27 PROCEDURE — 93005 ELECTROCARDIOGRAM TRACING: CPT

## 2024-09-27 PROCEDURE — 99213 OFFICE O/P EST LOW 20 MIN: CPT

## 2024-09-27 PROCEDURE — 71045 X-RAY EXAM CHEST 1 VIEW: CPT

## 2024-09-27 PROCEDURE — 83880 ASSAY OF NATRIURETIC PEPTIDE: CPT | Performed by: EMERGENCY MEDICINE

## 2024-09-27 PROCEDURE — 99285 EMERGENCY DEPT VISIT HI MDM: CPT

## 2024-09-27 NOTE — PROGRESS NOTES
[facility]        NAME: Alejandrina Aranda is a 44 y.o. female  : 1980    MRN: [id number]  DATE: 2024  TIME: 4:32 PM    Assessment and Plan   Dyspnea on exertion [R06.09]  1. Dyspnea on exertion  Transfer to other facility      2. Shortness of breath  Transfer to other facility      Patient will proceed to ED for further evaluation and treatment of dyspnea on exertion, chest pain, and shortness of breath. She will go to  ED. She declines EMS transport, is ambulatory and stable at time of exam.   Patient Instructions     Follow up with PCP in 3-5 days if no improvement. Proceed to ER if symptoms worsen.    Chief Complaint     Chief Complaint   Patient presents with    Shortness of Breath     Pt reports shortness of breath with activity x1 week. Using albuterol without relief.      History of Present Illness     Alejandrina Aranda is a 44 y.o. female presenting to the office today for upper respiratory complaints.   Symptoms have been present for 7 days, and include shortness of breath with activity, chest tightness. She states since she was seen by her allergist and did a PFT which was normal, so they were concerned it could be related to her heart due to the WOOD.   She has tried albuterol, Dulera, for her symptoms, no relief.  Sick contacts include: none    Review of Systems     Review of Systems   Constitutional:  Negative for chills and fever.   HENT: Negative.     Respiratory:  Positive for chest tightness and shortness of breath. Negative for wheezing and stridor.    Cardiovascular:  Positive for chest pain. Negative for palpitations and leg swelling.   Gastrointestinal: Negative.    Genitourinary: Negative.    Musculoskeletal: Negative.    Skin: Negative.    Neurological:  Negative for dizziness and light-headedness.       Current Medications       Current Outpatient Medications:     albuterol (ProAir HFA) 90 mcg/act inhaler, Inhale 2 puffs every 4 (four) hours as needed for wheezing,  Disp: 18 g, Rfl: 3    amLODIPine (NORVASC) 5 mg tablet, TAKE 1 TABLET (5 MG TOTAL) BY MOUTH DAILY., Disp: 90 tablet, Rfl: 2    Azelastine HCl 137 MCG/SPRAY SOLN, 1-2 sprays twice a day, Disp: 90 mL, Rfl: 3    calcium carbonate (OS-MAUREEN) 600 MG tablet, Take 600 mg by mouth daily, Disp: , Rfl:     Cetirizine HCl 10 MG CAPS, Take 1 capsule by mouth daily as needed , Disp: , Rfl:     Dulera 200-5 MCG/ACT inhaler, Inhale 2 puffs 2 (two) times a day Rinse mouth after use., Disp: 13 g, Rfl: 5    LORazepam (ATIVAN) 0.5 mg tablet, Take 1 tablet (0.5 mg total) by mouth every 6 (six) hours as needed for anxiety, Disp: 30 tablet, Rfl: 0    metFORMIN (GLUCOPHAGE) 1000 MG tablet, Take 1 tab QAM x 7 days, then 1 tab QAM + 0.5 QPM x 7 days, then 1 tab BID, Disp: 200 tablet, Rfl: 3    mometasone (NASONEX) 50 mcg/act nasal spray, SPRAY 2 SPRAYS INTO EACH NOSTRIL EVERY DAY, Disp: 51 g, Rfl: 2    montelukast (SINGULAIR) 10 mg tablet, Take 1 tablet (10 mg total) by mouth daily at bedtime, Disp: 90 tablet, Rfl: 3    Multiple Vitamin (MULTIVITAMIN PO), Take by mouth in the morning, Disp: , Rfl:     rosuvastatin (CRESTOR) 10 MG tablet, TAKE 1 TABLET BY MOUTH EVERY DAY, Disp: 90 tablet, Rfl: 1    sertraline (ZOLOFT) 50 mg tablet, TAKE 1 TABLET BY MOUTH EVERY DAY, Disp: 90 tablet, Rfl: 1    topiramate (Topamax) 50 MG tablet, Take 1 tab QAM x 7 days, then 1 tab BID x 7 days, then 2 tab QAM + 1 tab QPM x 7 days, then 2 tab BID, Disp: 360 tablet, Rfl: 2    acetaminophen (TYLENOL) 500 mg tablet, Take 500 mg by mouth every 6 (six) hours as needed for mild pain As needed, Disp: , Rfl:     albuterol (PROVENTIL HFA,VENTOLIN HFA) 90 mcg/act inhaler, Inhale 2 puffs every 6 (six) hours as needed for wheezing, Disp: 18 g, Rfl: 0    ibuprofen (MOTRIN) 400 mg tablet, Take 400 mg by mouth if needed, Disp: , Rfl:     ketoconazole (NIZORAL) 2 % shampoo, LATHER ON SCALP, LET IT SIT FOR 2 TO 5 MINUTES, THEN RINSE. USE 3 TIMES A WEEK, Disp: , Rfl:     omeprazole  (PriLOSEC) 40 MG capsule, Take 1 capsule (40 mg total) by mouth daily (Patient not taking: Reported on 9/27/2024), Disp: 30 capsule, Rfl: 3    Spacer/Aero-Holding Chambers (Vortex Valved Holding Chamber) HARMAN, Use 2 (two) times a day, Disp: 1 each, Rfl: 0    Current Allergies     Allergies as of 09/27/2024 - Reviewed 09/27/2024   Allergen Reaction Noted    Pollen extract Allergic Rhinitis 07/20/2021            The following portions of the patient's history were reviewed and updated as appropriate: allergies, current medications, past family history, past medical history, past social history, past surgical history and problem list.     Past Medical History:   Diagnosis Date    Allergic rhinitis     Anxiety     Depression     Family health problem     mother and grandmother h/o blood clots after surgery    Fibroid     Fullness in ear, left     Resolved [id number]    Hypertension     Resolved [id number]    Sleep apnea     Urinary tract infection     h/o       Past Surgical History:   Procedure Laterality Date    CHOLECYSTECTOMY      HYSTERECTOMY      OR TOTAL ABDOMINAL HYSTERECT W/WO RMVL TUBE OVARY N/A 6/18/2019    Procedure: Supracervical IWONA, removal of bilat tubes, removal of IUD, cystoscopy;  Surgeon: Geovanni Segura MD;  Location: [facility];  Service: Gynecology    TOOTH EXTRACTION         Family History   Problem Relation Age of Onset    Anxiety disorder Mother     Other Mother         Blood clots    Hypertension Mother     Hyperlipidemia Mother     Alcohol abuse Father     Hypertension Brother     Anxiety disorder Maternal Grandmother     Other Maternal Grandmother         Blood clots    Lung cancer Maternal Grandfather     No Known Problems Paternal Grandmother     No Known Problems Paternal Grandfather     Ovarian cancer Maternal Aunt 68    Alcohol abuse Maternal Aunt 65    Prostate cancer Maternal Uncle     No Known Problems Paternal Aunt     Heart disease Family         cardiac disorder    Cancer Family         " lung ca    Diabetes Family     Cancer Family        Medications have been verified.    Objective     /82   Pulse 90   Temp 99.2 °F (37.3 °C)   Resp 16   Ht 5' 7\" (1.702 m)   Wt (!) 140 kg (308 lb)   LMP 05/15/2019 (Approximate)   SpO2 98%   BMI 48.24 kg/m²   Patient's last menstrual period was 05/15/2019 (approximate).     Physical Exam     Physical Exam  Vitals and nursing note reviewed.   Constitutional:       General: She is not in acute distress.     Appearance: She is well-developed and normal weight. She is not ill-appearing, toxic-appearing or diaphoretic.   HENT:      Head: Normocephalic and atraumatic.      Mouth/Throat:      Mouth: Mucous membranes are moist.   Cardiovascular:      Rate and Rhythm: Normal rate. No extrasystoles are present.     Pulses: Normal pulses. No decreased pulses.      Heart sounds: Normal heart sounds. No murmur heard.     No friction rub. No gallop.   Pulmonary:      Effort: Pulmonary effort is normal. No tachypnea, bradypnea, accessory muscle usage or respiratory distress.      Breath sounds: No decreased breath sounds, wheezing, rhonchi or rales.   Chest:      Chest wall: No tenderness.   Musculoskeletal:         General: Normal range of motion.      Cervical back: Normal range of motion.   Skin:     General: Skin is warm and dry.      Capillary Refill: Capillary refill takes less than 2 seconds.   Neurological:      General: No focal deficit present.      Mental Status: She is alert. She is disoriented.   Psychiatric:         Mood and Affect: Mood normal.                   "

## 2024-09-28 NOTE — ED PROVIDER NOTES
Final diagnoses:   SOB (shortness of breath)     ED Disposition       ED Disposition   Discharge    Condition   Stable    Date/Time   Fri Sep 27, 2024  8:27 PM    Comment   Alejandrina Aranda discharge to home/self care.                   Assessment & Plan       Medical Decision Making  44-year-old female presenting with chest tightness and shortness of breath over the last few weeks.  Consider ACS, PE, CHF, asthma exacerbation.  Obtain cardiopulmonary evaluation with labs, D-dimer, chest x-ray.    Discussed all results with patient.  Follow-up with PCP.  Return precautions discussed    Amount and/or Complexity of Data Reviewed  Labs: ordered.             Medications - No data to display    ED Risk Strat Scores                                               History of Present Illness       Chief Complaint   Patient presents with    Shortness of Breath     More SOB today started last week, tightness in chest for about 3 weeks, started coughing more today, waking up coughing the last 3 weeks, thought it was asthma but saw dr last night, pt would like to know what is going on        Past Medical History:   Diagnosis Date    Allergic rhinitis     Anxiety     Asthma     Depression     Family health problem     mother and grandmother h/o blood clots after surgery    Fibroid     Fullness in ear, left     Resolved [id number]    Hypertension     Resolved [id number]    Sleep apnea     Urinary tract infection     h/o      Past Surgical History:   Procedure Laterality Date    CHOLECYSTECTOMY      HYSTERECTOMY      AL TOTAL ABDOMINAL HYSTERECT W/WO RMVL TUBE OVARY N/A 6/18/2019    Procedure: Supracervical IWONA, removal of bilat tubes, removal of IUD, cystoscopy;  Surgeon: Geovanni Segura MD;  Location: [facility];  Service: Gynecology    TOOTH EXTRACTION        Family History   Problem Relation Age of Onset    Anxiety disorder Mother     Other Mother         Blood clots    Hypertension Mother     Hyperlipidemia Mother     Alcohol abuse  Father     Hypertension Brother     Anxiety disorder Maternal Grandmother     Other Maternal Grandmother         Blood clots    Lung cancer Maternal Grandfather     No Known Problems Paternal Grandmother     No Known Problems Paternal Grandfather     Ovarian cancer Maternal Aunt 68    Alcohol abuse Maternal Aunt 65    Prostate cancer Maternal Uncle     No Known Problems Paternal Aunt     Heart disease Family         cardiac disorder    Cancer Family         lung ca    Diabetes Family     Cancer Family       Social History     Tobacco Use    Smoking status: Never     Passive exposure: Past    Smokeless tobacco: Never   Vaping Use    Vaping status: Never Used   Substance Use Topics    Alcohol use: Yes     Comment: rare    Drug use: No      E-Cigarette/Vaping    E-Cigarette Use Never User       E-Cigarette/Vaping Substances    Nicotine No     THC No     CBD No     Flavoring No     Other No     Unknown No       I have reviewed and agree with the history as documented.     44-year-old female with a history of asthma presents for evaluation of chest tightness and shortness of breath for the last few weeks.  Patient also reports a longstanding history of anxiety.  No specific alleviating or aggravating factors.  States symptoms do not feel like an asthma exacerbation.        Review of Systems   Respiratory:  Positive for chest tightness and shortness of breath.            Objective       ED Triage Vitals [09/27/24 1659]   Temperature Pulse Blood Pressure Respirations SpO2 Patient Position - Orthostatic VS   97.7 °F (36.5 °C) 67 (!) 164/108 22 99 % Sitting      Temp Source Heart Rate Source BP Location FiO2 (%) Pain Score    Oral Monitor Right arm -- 3      Vitals      Date and Time Temp Pulse SpO2 Resp BP Pain Score FACES Pain Rating User   09/27/24 2030 -- 56 98 % 22 134/77 -- -- AY   09/27/24 2000 -- 60 98 % 20 130/72 -- -- AY   09/27/24 1830 -- 63 98 % 20 127/80 -- -- JW   09/27/24 1800 -- 63 97 % 18 130/78 -- -- JW    09/27/24 1659 97.7 °F (36.5 °C) 67 99 % 22 164/108 3 -- EC            Physical Exam  Vitals and nursing note reviewed.   Constitutional:       Appearance: She is well-developed.   HENT:      Head: Normocephalic and atraumatic.      Right Ear: External ear normal.      Left Ear: External ear normal.      Nose: Nose normal.   Eyes:      General: No scleral icterus.  Cardiovascular:      Rate and Rhythm: Normal rate.   Pulmonary:      Effort: Pulmonary effort is normal. No respiratory distress.      Breath sounds: No decreased breath sounds or wheezing.   Abdominal:      General: There is no distension.   Musculoskeletal:         General: No deformity. Normal range of motion.      Cervical back: Normal range of motion.   Skin:     Findings: No rash.   Neurological:      General: No focal deficit present.      Mental Status: She is alert and oriented to person, place, and time.   Psychiatric:         Mood and Affect: Mood normal.         Results Reviewed       Procedure Component Value Units Date/Time    B-Type Natriuretic Peptide(BNP) [[id number]]  (Normal) Collected: 09/27/24 1728    Lab Status: Final result Specimen: Blood from Arm, Left Updated: 09/27/24 2015     BNP 19 pg/mL     HS Troponin I 2hr [[id number]] Collected: 09/27/24 1923    Lab Status: Final result Specimen: Blood from Arm, Left Updated: 09/27/24 1950     hs TnI 2hr <2 ng/L      Delta 2hr hsTnI --    D-dimer, quantitative [[id number]]  (Normal) Collected: 09/27/24 1747    Lab Status: Final result Specimen: Blood from Arm, Left Updated: 09/27/24 1810     D-Dimer, Quant 0.43 ug/ml FEU     HS Troponin 0hr (reflex protocol) [[id number]]  (Normal) Collected: 09/27/24 1728    Lab Status: Final result Specimen: Blood from Arm, Left Updated: 09/27/24 1807     hs TnI 0hr <2 ng/L     Comprehensive metabolic panel [[id number]]  (Abnormal) Collected: 09/27/24 1728    Lab Status: Final result Specimen: Blood from Arm, Left Updated: 09/27/24 1804     Sodium 140 mmol/L       Potassium 3.5 mmol/L      Chloride 110 mmol/L      CO2 22 mmol/L      ANION GAP 8 mmol/L      BUN 14 mg/dL      Creatinine 0.82 mg/dL      Glucose 93 mg/dL      Calcium 9.2 mg/dL      AST 12 U/L      ALT 10 U/L      Alkaline Phosphatase 65 U/L      Total Protein 7.2 g/dL      Albumin 4.3 g/dL      Total Bilirubin 0.28 mg/dL      eGFR 86 ml/min/1.73sq m     Narrative:      National Kid[employer] guidelines for Chronic Kidney Disease (CKD):     Stage 1 with normal or high GFR (GFR > 90 mL/min/1.73 square meters)    Stage 2 Mild CKD (GFR = 60-89 mL/min/1.73 square meters)    Stage 3A Moderate CKD (GFR = 45-59 mL/min/1.73 square meters)    Stage 3B Moderate CKD (GFR = 30-44 mL/min/1.73 square meters)    Stage 4 Severe CKD (GFR = 15-29 mL/min/1.73 square meters)    Stage 5 End Stage CKD (GFR <15 mL/min/1.73 square meters)  Note: GFR calculation is accurate only with a steady state creatinine    CBC and differential [[id number]]  (Abnormal) Collected: 09/27/24 1728    Lab Status: Final result Specimen: Blood from Arm, Left Updated: 09/27/24 1736     WBC 10.24 Thousand/uL      RBC 4.68 Million/uL      Hemoglobin 13.0 g/dL      Hematocrit 40.7 %      MCV 87 fL      MCH 27.8 pg      MCHC 31.9 g/dL      RDW 13.9 %      MPV 9.4 fL      Platelets 318 Thousands/uL      nRBC 0 /100 WBCs      Segmented % 66 %      Immature Grans % 0 %      Lymphocytes % 27 %      Monocytes % 7 %      Eosinophils Relative 0 %      Basophils Relative 0 %      Absolute Neutrophils 6.75 Thousands/µL      Absolute Immature Grans 0.02 Thousand/uL      Absolute Lymphocytes 2.76 Thousands/µL      Absolute Monocytes 0.67 Thousand/µL      Eosinophils Absolute 0.00 Thousand/µL      Basophils Absolute 0.04 Thousands/µL             XR chest 1 view portable   Final Interpretation by Geovanni Reed MD (09/27 2315)      No acute cardiopulmonary disease.            Workstation performed: [id number]             Procedures    ED Medication and  Procedure Management   Prior to Admission Medications   Prescriptions Last Dose Informant Patient Reported? Taking?   Azelastine HCl 137 MCG/SPRAY SOLN  Self No No   Si-2 sprays twice a day   Cetirizine HCl 10 MG CAPS  Self Yes No   Sig: Take 1 capsule by mouth daily as needed    Dulera 200-5 MCG/ACT inhaler   No No   Sig: Inhale 2 puffs 2 (two) times a day Rinse mouth after use.   LORazepam (ATIVAN) 0.5 mg tablet  Self No No   Sig: Take 1 tablet (0.5 mg total) by mouth every 6 (six) hours as needed for anxiety   Multiple Vitamin (MULTIVITAMIN PO)  Self Yes No   Sig: Take by mouth in the morning   Spacer/Aero-Holding Chambers (Vortex Valved Holding Chamber) HARMAN   No No   Sig: Use 2 (two) times a day   acetaminophen (TYLENOL) 500 mg tablet  Self Yes No   Sig: Take 500 mg by mouth every 6 (six) hours as needed for mild pain As needed   albuterol (PROVENTIL HFA,VENTOLIN HFA) 90 mcg/act inhaler  Self No No   Sig: Inhale 2 puffs every 6 (six) hours as needed for wheezing   albuterol (ProAir HFA) 90 mcg/act inhaler   No No   Sig: Inhale 2 puffs every 4 (four) hours as needed for wheezing   amLODIPine (NORVASC) 5 mg tablet  Self No No   Sig: TAKE 1 TABLET (5 MG TOTAL) BY MOUTH DAILY.   calcium carbonate (OS-MAUREEN) 600 MG tablet  Self Yes No   Sig: Take 600 mg by mouth daily   ibuprofen (MOTRIN) 400 mg tablet  Self Yes No   Sig: Take 400 mg by mouth if needed   ketoconazole (NIZORAL) 2 % shampoo   Yes No   Sig: LATHER ON SCALP, LET IT SIT FOR 2 TO 5 MINUTES, THEN RINSE. USE 3 TIMES A WEEK   metFORMIN (GLUCOPHAGE) 1000 MG tablet   No No   Sig: Take 1 tab QAM x 7 days, then 1 tab QAM + 0.5 QPM x 7 days, then 1 tab BID   mometasone (NASONEX) 50 mcg/act nasal spray   No No   Sig: SPRAY 2 SPRAYS INTO EACH NOSTRIL EVERY DAY   montelukast (SINGULAIR) 10 mg tablet   No No   Sig: Take 1 tablet (10 mg total) by mouth daily at bedtime   omeprazole (PriLOSEC) 40 MG capsule   No No   Sig: Take 1 capsule (40 mg total) by mouth daily    Patient not taking: Reported on 9/27/2024   rosuvastatin (CRESTOR) 10 MG tablet   No No   Sig: TAKE 1 TABLET BY MOUTH EVERY DAY   sertraline (ZOLOFT) 50 mg tablet   No No   Sig: TAKE 1 TABLET BY MOUTH EVERY DAY   topiramate (Topamax) 50 MG tablet   No No   Sig: Take 1 tab QAM x 7 days, then 1 tab BID x 7 days, then 2 tab QAM + 1 tab QPM x 7 days, then 2 tab BID      Facility-Administered Medications: None     Discharge Medication List as of 9/27/2024  8:27 PM        CONTINUE these medications which have NOT CHANGED    Details   acetaminophen (TYLENOL) 500 mg tablet Take 500 mg by mouth every 6 (six) hours as needed for mild pain As needed, Historical Med      !! albuterol (ProAir HFA) 90 mcg/act inhaler Inhale 2 puffs every 4 (four) hours as needed for wheezing, Starting Mon 7/29/2024, Normal      !! albuterol (PROVENTIL HFA,VENTOLIN HFA) 90 mcg/act inhaler Inhale 2 puffs every 6 (six) hours as needed for wheezing, Starting Sat 12/23/2023, Normal      amLODIPine (NORVASC) 5 mg tablet TAKE 1 TABLET (5 MG TOTAL) BY MOUTH DAILY., Starting Fri 3/15/2024, Normal      Azelastine HCl 137 MCG/SPRAY SOLN 1-2 sprays twice a day, Normal      calcium carbonate (OS-MAUREEN) 600 MG tablet Take 600 mg by mouth daily, Historical Med      Cetirizine HCl 10 MG CAPS Take 1 capsule by mouth daily as needed , Historical Med      Dulera 200-5 MCG/ACT inhaler Inhale 2 puffs 2 (two) times a day Rinse mouth after use., Starting Thu 9/26/2024, Normal      ibuprofen (MOTRIN) 400 mg tablet Take 400 mg by mouth if needed, Starting Fri 1/29/2021, Historical Med      ketoconazole (NIZORAL) 2 % shampoo LATHER ON SCALP, LET IT SIT FOR 2 TO 5 MINUTES, THEN RINSE. USE 3 TIMES A WEEK, Historical Med      LORazepam (ATIVAN) 0.5 mg tablet Take 1 tablet (0.5 mg total) by mouth every 6 (six) hours as needed for anxiety, Starting Tue 1/9/2024, Normal      metFORMIN (GLUCOPHAGE) 1000 MG tablet Take 1 tab QAM x 7 days, then 1 tab QAM + 0.5 QPM x 7 days, then 1  tab BID, Normal      mometasone (NASONEX) 50 mcg/act nasal spray SPRAY 2 SPRAYS INTO EACH NOSTRIL EVERY DAY, Normal      montelukast (SINGULAIR) 10 mg tablet Take 1 tablet (10 mg total) by mouth daily at bedtime, Starting Mon 7/1/2024, Normal      Multiple Vitamin (MULTIVITAMIN PO) Take by mouth in the morning, Historical Med      omeprazole (PriLOSEC) 40 MG capsule Take 1 capsule (40 mg total) by mouth daily, Starting Thu 9/26/2024, Normal      rosuvastatin (CRESTOR) 10 MG tablet TAKE 1 TABLET BY MOUTH EVERY DAY, Normal      sertraline (ZOLOFT) 50 mg tablet TAKE 1 TABLET BY MOUTH EVERY DAY, Starting Fri 6/28/2024, Normal      Spacer/Aero-Holding Chambers (Vortex Valved Holding Chamber) HARMAN Use 2 (two) times a day, Starting Mon 7/29/2024, Normal      topiramate (Topamax) 50 MG tablet Take 1 tab QAM x 7 days, then 1 tab BID x 7 days, then 2 tab QAM + 1 tab QPM x 7 days, then 2 tab BID, Normal       !! - Potential duplicate medications found. Please discuss with provider.        No discharge procedures on file.  ED SEPSIS DOCUMENTATION   Time reflects when diagnosis was documented in both MDM as applicable and the Disposition within this note       Time User Action Codes Description Comment    9/27/2024  8:27 PM William Benedict Add [R06.02] SOB (shortness of breath)                  William Benedict DO  09/27/24 2572

## 2024-10-10 ENCOUNTER — OFFICE VISIT (OUTPATIENT)
Dept: FAMILY MEDICINE CLINIC | Facility: CLINIC | Age: 44
End: 2024-10-10
Payer: COMMERCIAL

## 2024-10-10 VITALS
OXYGEN SATURATION: 98 % | BODY MASS INDEX: 45.99 KG/M2 | RESPIRATION RATE: 17 BRPM | HEIGHT: 67 IN | DIASTOLIC BLOOD PRESSURE: 78 MMHG | SYSTOLIC BLOOD PRESSURE: 114 MMHG | WEIGHT: 293 LBS | TEMPERATURE: 97.5 F | HEART RATE: 75 BPM

## 2024-10-10 DIAGNOSIS — R06.09 EXERTIONAL DYSPNEA: ICD-10-CM

## 2024-10-10 DIAGNOSIS — Z23 NEED FOR VACCINATION: ICD-10-CM

## 2024-10-10 DIAGNOSIS — E66.01 CLASS 3 SEVERE OBESITY DUE TO EXCESS CALORIES WITHOUT SERIOUS COMORBIDITY WITH BODY MASS INDEX (BMI) OF 45.0 TO 49.9 IN ADULT (HCC): ICD-10-CM

## 2024-10-10 DIAGNOSIS — E78.2 MIXED HYPERLIPIDEMIA: ICD-10-CM

## 2024-10-10 DIAGNOSIS — R07.89 CHEST TIGHTNESS: ICD-10-CM

## 2024-10-10 DIAGNOSIS — E66.813 CLASS 3 SEVERE OBESITY DUE TO EXCESS CALORIES WITHOUT SERIOUS COMORBIDITY WITH BODY MASS INDEX (BMI) OF 45.0 TO 49.9 IN ADULT (HCC): ICD-10-CM

## 2024-10-10 DIAGNOSIS — I10 PRIMARY HYPERTENSION: Primary | ICD-10-CM

## 2024-10-10 DIAGNOSIS — R73.03 PREDIABETES: ICD-10-CM

## 2024-10-10 PROCEDURE — 99214 OFFICE O/P EST MOD 30 MIN: CPT | Performed by: FAMILY MEDICINE

## 2024-10-10 PROCEDURE — 90471 IMMUNIZATION ADMIN: CPT

## 2024-10-10 PROCEDURE — 90715 TDAP VACCINE 7 YRS/> IM: CPT

## 2024-10-10 NOTE — PROGRESS NOTES
Ambulatory Visit  Name: Alejandrina Aranda      : 1980      MRN: [id number]  Encounter Provider: Iza Jeff DO  Encounter Date: 10/10/2024   Encounter department: [facility]    Assessment & Plan  Primary hypertension  The patient's blood pressure is well-controlled on her current medication.  She is having some increasing exertional dyspnea for which she was evaluated in the ER.  We will send her for follow-up echocardiogram and stress test as indicated and we will follow-up closely with the results.  We will have further instructions after the testing.  Orders:    Echo complete w/ contrast if indicated; Future    Stress test only, exercise; Future    Mixed hyperlipidemia  The patient will continue with the current dose of the rosuvastatin.  We have made no changes today.       Prediabetes  The patient will continue to work on her diet and exercise and weight loss as she is doing.  We will continue to monitor closely.       Exertional dyspnea    Orders:    Echo complete w/ contrast if indicated; Future    Stress test only, exercise; Future    Chest tightness    Orders:    Echo complete w/ contrast if indicated; Future    Stress test only, exercise; Future    Class 3 severe obesity due to excess calories without serious comorbidity with body mass index (BMI) of 45.0 to 49.9 in adult (HCC)  The patient has successfully lost 14 pounds with the metformin and topiramate combo.  We will keep her on the same medication and she will continue her excellent job with her diet and exercise.         Need for vaccination    Orders:    TDAP VACCINE GREATER THAN OR EQUAL TO 8YO IM       Chief Complaint   Patient presents with    Obesity     Pt has been on metformin/topamax for weightloss.      History of Present Illness     Alejandrina Aranda is a 44 y.o. female who presents today for a follow-up of her hypertension, hyperlipidemia, and obesity.  She is doing very well with the metformin and  "topiramate combo for weight loss and has successfully lost 14 pounds.  She is watching her diet.  She does have occasional increased dyspnea with exertion.    Seen in the ER for shortness of breath and chest tightness- there was no relief with the albuterol.  Went for eval.    Fortunately her workup was negative for ischemia and they determined it likely could be related to GERD.  She was started on Omeprzaole- better  Still tight once in a while.    There is no nausea, vomiting, or diarrhea.    Hypertension  This is a chronic problem. The problem is unchanged. The problem is controlled. Associated symptoms include chest pain and shortness of breath. Pertinent negatives include no anxiety, blurred vision, headaches, malaise/fatigue, neck pain, orthopnea, palpitations, peripheral edema, PND or sweats.       History obtained from : patient  Review of Systems   Constitutional: Negative.  Negative for malaise/fatigue.   HENT: Negative.     Eyes: Negative.  Negative for blurred vision.   Respiratory:  Positive for shortness of breath.    Cardiovascular:  Positive for chest pain. Negative for palpitations, orthopnea and PND.   Gastrointestinal: Negative.    Endocrine: Negative.    Genitourinary: Negative.    Musculoskeletal: Negative.  Negative for neck pain.   Skin: Negative.    Allergic/Immunologic: Negative.    Neurological: Negative.  Negative for headaches.   Hematological: Negative.    Psychiatric/Behavioral: Negative.               Objective     /78 (BP Location: Left arm, Patient Position: Sitting, Cuff Size: Large)   Pulse 75   Temp 97.5 °F (36.4 °C) (Tympanic)   Resp 17   Ht 5' 7\" (1.702 m)   Wt (!) 140 kg (307 lb 9.6 oz)   LMP 05/15/2019 (Approximate)   SpO2 98%   BMI 48.18 kg/m²     Physical Exam  Constitutional:       General: She is not in acute distress.     Appearance: Normal appearance. She is well-developed. She is not diaphoretic.   HENT:      Head: Normocephalic and atraumatic.      Right " Ear: Tympanic membrane, ear canal and external ear normal.      Left Ear: Tympanic membrane, ear canal and external ear normal.      Nose: Nose normal.      Mouth/Throat:      Mouth: Mucous membranes are moist.      Pharynx: Oropharynx is clear. No oropharyngeal exudate.   Eyes:      General: No scleral icterus.        Right eye: No discharge.         Left eye: No discharge.      Extraocular Movements: Extraocular movements intact.      Pupils: Pupils are equal, round, and reactive to light.   Neck:      Thyroid: No thyromegaly.      Vascular: No JVD.      Trachea: No tracheal deviation.   Cardiovascular:      Rate and Rhythm: Normal rate and regular rhythm.      Heart sounds: Normal heart sounds. No murmur heard.     No friction rub. No gallop.   Pulmonary:      Effort: Pulmonary effort is normal. No respiratory distress.      Breath sounds: Normal breath sounds. No wheezing or rales.   Chest:      Chest wall: No tenderness.   Abdominal:      General: Bowel sounds are normal. There is no distension.      Palpations: Abdomen is soft. There is no mass.      Tenderness: There is no abdominal tenderness. There is no guarding or rebound.      Hernia: No hernia is present.   Musculoskeletal:         General: No tenderness or deformity. Normal range of motion.      Cervical back: Normal range of motion and neck supple.   Lymphadenopathy:      Cervical: No cervical adenopathy.   Skin:     General: Skin is warm and dry.      Coloration: Skin is not pale.      Findings: No erythema or rash.   Neurological:      Mental Status: She is alert and oriented to person, place, and time.      Cranial Nerves: No cranial nerve deficit.      Sensory: No sensory deficit.      Motor: No abnormal muscle tone.      Coordination: Coordination normal.      Deep Tendon Reflexes: Reflexes normal.   Psychiatric:         Mood and Affect: Mood normal.         Behavior: Behavior normal.         Thought Content: Thought content normal.          Judgment: Judgment normal.       Administrative Statements   I have spent a total time of 20 minutes in caring for this patient on the day of the visit/encounter including Diagnostic results, Prognosis, Risks and benefits of tx options, Risk factor reductions, Impressions, Counseling / Coordination of care, Documenting in the medical record, and Obtaining or reviewing history  .

## 2024-10-11 NOTE — ASSESSMENT & PLAN NOTE
The patient will continue to work on her diet and exercise and weight loss as she is doing.  We will continue to monitor closely.

## 2024-10-11 NOTE — ASSESSMENT & PLAN NOTE
The patient has successfully lost 14 pounds with the metformin and topiramate combo.  We will keep her on the same medication and she will continue her excellent job with her diet and exercise.

## 2024-10-11 NOTE — ASSESSMENT & PLAN NOTE
The patient's blood pressure is well-controlled on her current medication.  She is having some increasing exertional dyspnea for which she was evaluated in the ER.  We will send her for follow-up echocardiogram and stress test as indicated and we will follow-up closely with the results.  We will have further instructions after the testing.  Orders:    Echo complete w/ contrast if indicated; Future    Stress test only, exercise; Future

## 2024-11-21 NOTE — PATIENT INSTRUCTIONS
Comment: History of Mohs procedure for BCC in August 2024, done by Dr. Owens. Dyspareunia in Women   WHAT YOU NEED TO KNOW:   What is dyspareunia? Dyspareunia is pain during intercourse (sex)  You may have pain before, during, or after sex  You may have pain every time you have sex, or only certain times  You may have had pain since the first time you had sex, or the pain might start suddenly  Dyspareunia may cause you to feel embarrassed or cause problems in your relationship with your partner  Many causes of dyspareunia can be treated  It is important for you to talk with your healthcare provider about your symptoms  What causes or increases my risk for dyspareunia? · Hormone changes that happen just before or during menopause, or because of breastfeeding    · Stress, anxiety, or emotional problems    · An episiotomy scar after childbirth, irritation in or around your vagina, or an abnormal vaginal structure    · Irritation from a lack of lubrication during sex    · An infection, trauma, or tumor    · Endometriosis, or surgery in your lower abdomen    · Pregnancy, or a recent vaginal delivery    · Constipation, or irritable bowel syndrome     · Use of drugs, alcohol, or medicines such as antihistamines  What are the signs and symptoms of dyspareunia? Signs and symptoms will depend on the cause  You may have any of the following:  · Pain anywhere from the opening of your vagina to your abdomen    · Pain with penetration, including when you insert a sex toy    · A feeling of pressure or burning anywhere in your vagina    · Less interest in having sex, trouble becoming aroused, or trouble having an orgasm    · A watery discharge from your vagina  How is dyspareunia diagnosed? Your healthcare provider will examine your vagina for possible causes of your pain  Your bladder, rectum, or other parts of your lower abdomen may also need to be examined  · Blood or urine tests  may be used to check for an infection       · A vaginal exam  will help check for problems at the opening or inside your Render Risk Assessment In Note?: no vagina that can cause dyspareunia  Your healthcare provider may insert a finger into your vagina  A cotton swab may be used to check for pain or other symptoms  Talk to your healthcare provider if you have any anxiety or concerns about an exam      · A colposcopy  is a procedure used to check your vagina and cervix (opening to your uterus)  Your healthcare provider will insert a scope into your vagina for the exam  A sample of tissue may be taken during the colposcopy  · Laparoscopy  is surgery used to check the uterus, fallopian tubes, and ovaries  Your healthcare provider will make an incision in your lower abdomen  A thin tube with a light on the end is put through the incision  Laparoscopy is used to check for a tumor, fibroid, or other problem causing your pain  Your healthcare provider may also take a tissue sample  How is dyspareunia treated? · Medicine  may be given to treat an infection or to relieve pain  Pain medicine may be given as a pill or as a cream you can apply to your vagina  · Estrogen  may be given as a cream, a pill, or a ring that fits in your vagina  Estrogen may help relieve your symptoms if they are caused by low estrogen levels  · Lubricant  can help make sex more comfortable  Lubricants are available without a prescription  Talk to your healthcare provider about which kind to use if you are using condoms for birth control  Oil-based lubricants can damage condoms  Choose a silicone-based or water-based lubricant  · Surgery  may be needed to remove a tumor or fibroid  Surgery can also be used to remove extra tissue from your vagina, or to widen your vagina  You may need surgery to fix a problem with a structure in your lower abdomen  What can I do to manage dyspareunia? · A sitz bath  may help reduce inflammation  To make a sitz bath, fill the bathtub with warm water until it is at about the level of your belly button  Stay in the bath for about 15 to 20 minutes   A sitz Detail Level: Simple bath is also available as a small tub that will fit under your toilet seat  You will fill the tub with water as directed once it is under the toilet seat  Your healthcare provider can tell you how often to use a sitz bath  · Kegel exercises  may be recommended to help strengthen your pelvic muscles  Pelvic muscles hold your pelvic organs, such as your bladder and uterus, in place  To do Kegel exercises, tighten your pelvic muscles slowly  It should feel like you are trying to hold back urine  Hold these muscles and count to 3  Relax, tighten them quickly, and release  Repeat the cycle 10 times  · A change of position  can help make sex more comfortable  You might also want to try having sex at different times of the month if you have monthly periods  This will help you find a time of the month that is most comfortable for you  · Therapy  with a mental health counselor may help you feel less anxious about sex  You can talk to a counselor by yourself or with your partner  When should I contact my healthcare provider? · You have new or worsening symptoms  · You have questions or concerns about your condition or care  CARE AGREEMENT:   You have the right to help plan your care  Learn about your health condition and how it may be treated  Discuss treatment options with your caregivers to decide what care you want to receive  You always have the right to refuse treatment  The above information is an  only  It is not intended as medical advice for individual conditions or treatments  Talk to your doctor, nurse or pharmacist before following any medical regimen to see if it is safe and effective for you  © 2017 [employer]  Information is for End User's use only and may not be sold, redistributed or otherwise used for commercial purposes  All illustrations and images included in CareNotes® are the copyrighted property of A D [employer] M [employer]  or [employer]

## 2024-12-21 DIAGNOSIS — E78.2 MIXED HYPERLIPIDEMIA: ICD-10-CM

## 2024-12-21 DIAGNOSIS — E66.813 CLASS 3 SEVERE OBESITY DUE TO EXCESS CALORIES WITHOUT SERIOUS COMORBIDITY WITH BODY MASS INDEX (BMI) OF 50.0 TO 59.9 IN ADULT (HCC): ICD-10-CM

## 2024-12-21 DIAGNOSIS — E66.01 CLASS 3 SEVERE OBESITY DUE TO EXCESS CALORIES WITHOUT SERIOUS COMORBIDITY WITH BODY MASS INDEX (BMI) OF 50.0 TO 59.9 IN ADULT (HCC): ICD-10-CM

## 2024-12-21 DIAGNOSIS — Z00.00 ANNUAL PHYSICAL EXAM: ICD-10-CM

## 2024-12-21 DIAGNOSIS — F33.9 DEPRESSION, RECURRENT (HCC): ICD-10-CM

## 2024-12-21 DIAGNOSIS — F41.1 GENERALIZED ANXIETY DISORDER: ICD-10-CM

## 2024-12-23 RX ORDER — TOPIRAMATE 50 MG/1
TABLET, FILM COATED ORAL
Qty: 360 TABLET | Refills: 1 | Status: SHIPPED | OUTPATIENT
Start: 2024-12-23

## 2024-12-23 RX ORDER — ROSUVASTATIN CALCIUM 10 MG/1
10 TABLET, COATED ORAL DAILY
Qty: 90 TABLET | Refills: 1 | Status: SHIPPED | OUTPATIENT
Start: 2024-12-23

## 2024-12-23 RX ORDER — TOPIRAMATE 50 MG/1
TABLET, FILM COATED ORAL
Qty: 360 TABLET | Refills: 0 | OUTPATIENT
Start: 2024-12-23

## 2024-12-23 RX ORDER — AMLODIPINE BESYLATE 5 MG/1
5 TABLET ORAL DAILY
Qty: 90 TABLET | Refills: 1 | Status: SHIPPED | OUTPATIENT
Start: 2024-12-23

## 2025-01-23 ENCOUNTER — OFFICE VISIT (OUTPATIENT)
Dept: URGENT CARE | Facility: CLINIC | Age: 45
End: 2025-01-23
Payer: COMMERCIAL

## 2025-01-23 VITALS
SYSTOLIC BLOOD PRESSURE: 140 MMHG | HEART RATE: 71 BPM | BODY MASS INDEX: 45.99 KG/M2 | RESPIRATION RATE: 16 BRPM | WEIGHT: 293 LBS | OXYGEN SATURATION: 99 % | DIASTOLIC BLOOD PRESSURE: 86 MMHG | HEIGHT: 67 IN | TEMPERATURE: 97.8 F

## 2025-01-23 DIAGNOSIS — J01.90 ACUTE NON-RECURRENT SINUSITIS, UNSPECIFIED LOCATION: Primary | ICD-10-CM

## 2025-01-23 PROCEDURE — 99213 OFFICE O/P EST LOW 20 MIN: CPT | Performed by: PHYSICIAN ASSISTANT

## 2025-01-23 RX ORDER — METHYLPREDNISOLONE 4 MG/1
TABLET ORAL
Qty: 1 EACH | Refills: 0 | Status: SHIPPED | OUTPATIENT
Start: 2025-01-23

## 2025-01-23 NOTE — PROGRESS NOTES
"  [facility]        NAME: Alejandrina Aranda is a 44 y.o. female  : 1980    MRN: [id number]  DATE: 2025  TIME: 5:25 PM    Assessment and Plan   Acute non-recurrent sinusitis, unspecified location [J01.90]  1. Acute non-recurrent sinusitis, unspecified location  amoxicillin-clavulanate (AUGMENTIN) 875-125 mg per tablet    methylPREDNISolone 4 MG tablet therapy pack      Patient was told we do not test for RSV.    Offered COVID and flu testing but declined      Patient Instructions   Patient was educated on signs of RSV.    Patient was educated on sinus infection. Most sinus infection are viral in nature and do not require treatment for 7-10 days.    Steroids and antibiotics were sent to pharmacy. Do not take OTC anti-inflammatories while on steroids.     Any chest pain or shortness of breath go to ED.    If you begin to have persistent cough recommend chest xray    Follow up with PCP in 3-5 days.  Proceed to  ER if symptoms worsen.    If tests have been performed at [facility], our office will contact you with results if changes need to be made to the care plan discussed with you at the visit.  [facility]    Chief Complaint     Chief Complaint   Patient presents with    Cold Like Symptoms     Pt reports today she developed fatigue, congestion and a \"tickle in her throat\". Denies fevers.  has RSV.         History of Present Illness       Patient presents today with congestion, tickle in throat and fatigue for 1 day. Patient reports her  wanted her seen due to him recently testing positive for RSV. Patient denies any cough. Admits root canal on left side of mouth on 25.Denies any history of asthma or diabetes. Denies any allergies to mediations.         Review of Systems   Review of Systems   HENT:  Positive for congestion and dental problem.         Tickle in throat   Respiratory:  Positive for cough.    Cardiovascular: Negative.  "   Psychiatric/Behavioral: Negative.           Current Medications       Current Outpatient Medications:     albuterol (ProAir HFA) 90 mcg/act inhaler, Inhale 2 puffs every 4 (four) hours as needed for wheezing, Disp: 18 g, Rfl: 3    amLODIPine (NORVASC) 5 mg tablet, TAKE 1 TABLET (5 MG TOTAL) BY MOUTH DAILY., Disp: 90 tablet, Rfl: 1    amoxicillin-clavulanate (AUGMENTIN) 875-125 mg per tablet, Take 1 tablet by mouth every 12 (twelve) hours for 7 days, Disp: 14 tablet, Rfl: 0    Azelastine HCl 137 MCG/SPRAY SOLN, 1-2 sprays twice a day, Disp: 90 mL, Rfl: 3    budesonide-formoterol (Symbicort) 160-4.5 mcg/act inhaler, Inhale 2 puffs 2 (two) times a day Rinse mouth after use., Disp: 10.2 g, Rfl: 5    calcium carbonate (OS-MAUREEN) 600 MG tablet, Take 600 mg by mouth daily, Disp: , Rfl:     Cetirizine HCl 10 MG CAPS, Take 1 capsule by mouth daily as needed , Disp: , Rfl:     ibuprofen (MOTRIN) 400 mg tablet, Take 400 mg by mouth if needed, Disp: , Rfl:     LORazepam (ATIVAN) 0.5 mg tablet, Take 1 tablet (0.5 mg total) by mouth every 6 (six) hours as needed for anxiety, Disp: 30 tablet, Rfl: 0    metFORMIN (GLUCOPHAGE) 1000 MG tablet, Take 1 tab QAM x 7 days, then 1 tab QAM + 0.5 QPM x 7 days, then 1 tab BID, Disp: 200 tablet, Rfl: 0    methylPREDNISolone 4 MG tablet therapy pack, Use as directed on package, Disp: 1 each, Rfl: 0    mometasone (NASONEX) 50 mcg/act nasal spray, SPRAY 2 SPRAYS INTO EACH NOSTRIL EVERY DAY, Disp: 51 g, Rfl: 2    montelukast (SINGULAIR) 10 mg tablet, Take 1 tablet (10 mg total) by mouth daily at bedtime, Disp: 90 tablet, Rfl: 3    Multiple Vitamin (MULTIVITAMIN PO), Take by mouth in the morning, Disp: , Rfl:     rosuvastatin (CRESTOR) 10 MG tablet, TAKE 1 TABLET BY MOUTH EVERY DAY, Disp: 90 tablet, Rfl: 1    topiramate (TOPAMAX) 50 MG tablet, TAKE 1 TAB BY MOUTH DAILY IN AM X 7 DAYS, THEN 1 TAB TWICE A DAY X 7 DAYS, THEN 2 TAB IN AM WITH 1 TAB IN PM X 7 DAYS, THEN 2 TABS TWICE A DAY, Disp: 360  tablet, Rfl: 1    acetaminophen (TYLENOL) 500 mg tablet, Take 500 mg by mouth every 6 (six) hours as needed for mild pain As needed, Disp: , Rfl:     albuterol (PROVENTIL HFA,VENTOLIN HFA) 90 mcg/act inhaler, Inhale 2 puffs every 6 (six) hours as needed for wheezing, Disp: 18 g, Rfl: 0    ketoconazole (NIZORAL) 2 % shampoo, LATHER ON SCALP, LET IT SIT FOR 2 TO 5 MINUTES, THEN RINSE. USE 3 TIMES A WEEK, Disp: , Rfl:     sertraline (ZOLOFT) 50 mg tablet, TAKE 1 TABLET BY MOUTH EVERY DAY, Disp: 90 tablet, Rfl: 1    Spacer/Aero-Holding Chambers (Vortex Valved Holding Chamber) HARMAN, Use 2 (two) times a day, Disp: 1 each, Rfl: 0    Current Allergies     Allergies as of 01/23/2025 - Reviewed 01/23/2025   Allergen Reaction Noted    Pollen extract Allergic Rhinitis 07/20/2021            The following portions of the patient's history were reviewed and updated as appropriate: allergies, current medications, past family history, past medical history, past social history, past surgical history and problem list.     Past Medical History:   Diagnosis Date    Allergic rhinitis     Anxiety     Asthma     Depression     Family health problem     mother and grandmother h/o blood clots after surgery    Fibroid     Fullness in ear, left     Resolved [id number]    Hypertension     Resolved [id number]    Sleep apnea     Urinary tract infection     h/o       Past Surgical History:   Procedure Laterality Date    CHOLECYSTECTOMY      HYSTERECTOMY      NE TOTAL ABDOMINAL HYSTERECT W/WO RMVL TUBE OVARY N/A 6/18/2019    Procedure: Supracervical IWONA, removal of bilat tubes, removal of IUD, cystoscopy;  Surgeon: Geovanni Segura MD;  Location: [facility] OR;  Service: Gynecology    TOOTH EXTRACTION         Family History   Problem Relation Age of Onset    Anxiety disorder Mother     Other Mother         Blood clots    Hypertension Mother     Hyperlipidemia Mother     Depression Mother     Alcohol abuse Father         Is in recovery.    Hypertension  "Brother     Anxiety disorder Maternal Grandmother     Other Maternal Grandmother         Blood clots    Depression Maternal Grandmother     Lung cancer Maternal Grandfather     Cancer Maternal Grandfather         Lung Cancer    Diabetes Paternal Grandmother         had type 2 diabetes due to weight    No Known Problems Paternal Grandfather     Ovarian cancer Maternal Aunt 68    Alcohol abuse Maternal Aunt 65    Prostate cancer Maternal Uncle     No Known Problems Paternal Aunt     Heart disease Family         cardiac disorder    Cancer Family         lung ca    Diabetes Family     Cancer Family     Cancer Maternal Aunt         Started in uterus, has spread to most of the body    Cancer Maternal Uncle         Prostate Cancer         Medications have been verified.        Objective   /86   Pulse 71   Temp 97.8 °F (36.6 °C)   Resp 16   Ht 5' 7\" (1.702 m)   Wt (!) 140 kg (309 lb)   LMP 05/15/2019 (Approximate)   SpO2 99%   BMI 48.40 kg/m²   Patient's last menstrual period was 05/15/2019 (approximate).       Physical Exam     Physical Exam  Vitals and nursing note reviewed.   Constitutional:       Appearance: Normal appearance.   HENT:      Head: Normocephalic.      Comments: Pain over left maxillary sinus ( but this is the side of her root canal from 1/22/25)    No pain over right maxillary sinus. No pain over B/L frontal sinus     Right Ear: Ear canal and external ear normal.      Left Ear: Ear canal and external ear normal.      Ears:      Comments: Bulging TM's with no signs of infection     Mouth/Throat:      Mouth: Mucous membranes are moist.   Eyes:      Pupils: Pupils are equal, round, and reactive to light.   Cardiovascular:      Rate and Rhythm: Normal rate and regular rhythm.      Heart sounds: Normal heart sounds.   Pulmonary:      Breath sounds: Normal breath sounds. No wheezing.   Neurological:      Mental Status: She is alert and oriented to person, place, and time.   Psychiatric:         " Mood and Affect: Mood normal.         Behavior: Behavior normal.

## 2025-01-23 NOTE — PATIENT INSTRUCTIONS
Patient was educated on signs of RSV.    Patient was educated on sinus infection. Most sinus infection are viral in nature and do not require treatment for 7-10 days.    Steroids and antibiotics were sent to pharmacy. Do not take OTC anti-inflammatories while on steroids.     Any chest pain or shortness of breath go to ED.    If you begin to have persistent cough recommend chest xray

## 2025-02-12 ENCOUNTER — OFFICE VISIT (OUTPATIENT)
Dept: FAMILY MEDICINE CLINIC | Facility: CLINIC | Age: 45
End: 2025-02-12
Payer: COMMERCIAL

## 2025-02-12 VITALS
SYSTOLIC BLOOD PRESSURE: 120 MMHG | TEMPERATURE: 98.1 F | OXYGEN SATURATION: 98 % | RESPIRATION RATE: 16 BRPM | HEIGHT: 67 IN | HEART RATE: 68 BPM | WEIGHT: 293 LBS | BODY MASS INDEX: 45.99 KG/M2 | DIASTOLIC BLOOD PRESSURE: 82 MMHG

## 2025-02-12 DIAGNOSIS — Z13.1 SCREENING FOR DIABETES MELLITUS: ICD-10-CM

## 2025-02-12 DIAGNOSIS — I10 PRIMARY HYPERTENSION: ICD-10-CM

## 2025-02-12 DIAGNOSIS — E66.813 CLASS 3 SEVERE OBESITY DUE TO EXCESS CALORIES WITHOUT SERIOUS COMORBIDITY WITH BODY MASS INDEX (BMI) OF 45.0 TO 49.9 IN ADULT (HCC): ICD-10-CM

## 2025-02-12 DIAGNOSIS — Z23 ENCOUNTER FOR IMMUNIZATION: ICD-10-CM

## 2025-02-12 DIAGNOSIS — Z00.00 ANNUAL PHYSICAL EXAM: Primary | ICD-10-CM

## 2025-02-12 DIAGNOSIS — Z11.59 ENCOUNTER FOR HEPATITIS C SCREENING TEST FOR LOW RISK PATIENT: ICD-10-CM

## 2025-02-12 DIAGNOSIS — E78.2 MIXED HYPERLIPIDEMIA: ICD-10-CM

## 2025-02-12 DIAGNOSIS — R73.03 PREDIABETES: ICD-10-CM

## 2025-02-12 DIAGNOSIS — Z12.11 SCREENING FOR COLON CANCER: ICD-10-CM

## 2025-02-12 DIAGNOSIS — E66.01 CLASS 3 SEVERE OBESITY DUE TO EXCESS CALORIES WITHOUT SERIOUS COMORBIDITY WITH BODY MASS INDEX (BMI) OF 45.0 TO 49.9 IN ADULT (HCC): ICD-10-CM

## 2025-02-12 DIAGNOSIS — J45.40 MODERATE PERSISTENT ASTHMA WITHOUT COMPLICATION: ICD-10-CM

## 2025-02-12 DIAGNOSIS — Z13.6 SCREENING FOR CARDIOVASCULAR CONDITION: ICD-10-CM

## 2025-02-12 PROCEDURE — 90471 IMMUNIZATION ADMIN: CPT

## 2025-02-12 PROCEDURE — 99396 PREV VISIT EST AGE 40-64: CPT | Performed by: FAMILY MEDICINE

## 2025-02-12 PROCEDURE — 90677 PCV20 VACCINE IM: CPT

## 2025-02-12 NOTE — ASSESSMENT & PLAN NOTE
The patient will continue on the current dose of her rosuvastatin.  Orders:  •  CBC and differential; Future  •  Comprehensive metabolic panel; Future  •  LDL cholesterol, direct; Future  •  Lipid panel; Future  •  TSH, 3rd generation with Free T4 reflex; Future  •  UA (URINE) with reflex to Scope; Future  •  Hemoglobin A1C; Future

## 2025-02-12 NOTE — PROGRESS NOTES
Adult Annual Physical  Name: Alejandrina Aranda      : 1980      MRN: [id number]  Encounter Provider: Iza Jeff DO  Encounter Date: 2025   Encounter department: [facility]    Assessment & Plan  Annual physical exam  The patient had a normal exam today in the office.  She will go for the testing as ordered and we will follow-up with results.  We will see her back in the office as scheduled.  She will continue to work on her diet and exercise.       Primary hypertension  Patient's blood pressure stable on her current medication.  We have made no changes today.  Orders:  •  CBC and differential; Future  •  Comprehensive metabolic panel; Future  •  LDL cholesterol, direct; Future  •  Lipid panel; Future  •  TSH, 3rd generation with Free T4 reflex; Future  •  UA (URINE) with reflex to Scope; Future  •  Hemoglobin A1C; Future  •  Cologuard    Prediabetes  The patient will continue to work on improving her diet and exercise.  She will go for the testing as indicated.  Orders:  •  CBC and differential; Future  •  Comprehensive metabolic panel; Future  •  LDL cholesterol, direct; Future  •  Lipid panel; Future  •  TSH, 3rd generation with Free T4 reflex; Future  •  UA (URINE) with reflex to Scope; Future  •  Hemoglobin A1C; Future    Mixed hyperlipidemia  The patient will continue on the current dose of her rosuvastatin.  Orders:  •  CBC and differential; Future  •  Comprehensive metabolic panel; Future  •  LDL cholesterol, direct; Future  •  Lipid panel; Future  •  TSH, 3rd generation with Free T4 reflex; Future  •  UA (URINE) with reflex to Scope; Future  •  Hemoglobin A1C; Future    Class 3 severe obesity due to excess calories without serious comorbidity with body mass index (BMI) of 45.0 to 49.9 in adult (HCC)  The patient will continue with the topiramate and the metformin.  She is going to check with her insurance to see if coverage has improved with the GLP-1 agonists as an  option.  Orders:  •  CBC and differential; Future  •  Comprehensive metabolic panel; Future  •  LDL cholesterol, direct; Future  •  Lipid panel; Future  •  TSH, 3rd generation with Free T4 reflex; Future  •  UA (URINE) with reflex to Scope; Future  •  Hemoglobin A1C; Future    Moderate persistent asthma without complication  The patient is stable on her current inhalers.       Screening for cardiovascular condition    Orders:  •  CBC and differential; Future  •  Comprehensive metabolic panel; Future  •  LDL cholesterol, direct; Future  •  Lipid panel; Future  •  TSH, 3rd generation with Free T4 reflex; Future  •  UA (URINE) with reflex to Scope; Future  •  Hemoglobin A1C; Future    Screening for diabetes mellitus    Orders:  •  CBC and differential; Future  •  Comprehensive metabolic panel; Future  •  LDL cholesterol, direct; Future  •  Lipid panel; Future  •  TSH, 3rd generation with Free T4 reflex; Future  •  UA (URINE) with reflex to Scope; Future  •  Hemoglobin A1C; Future    Screening for colon cancer    Orders:  •  Cologuard    Encounter for immunization    Orders:  •  Pneumococcal Conjugate Vaccine 20-valent (Pcv20)    Encounter for hepatitis C screening test for low risk patient    Orders:  •  Hepatitis C antibody; Future    Immunizations and preventive care screenings were discussed with patient today. Appropriate education was printed on patient's after visit summary.    Counseling:  Dental Health: discussed importance of regular tooth brushing, flossing, and dental visits.  Injury prevention: discussed safety/seat belts, safety helmets, smoke detectors, carbon monoxide detectors, and smoking near bedding or upholstery.  Exercise: the importance of regular exercise/physical activity was discussed. Recommend exercise 3-5 times per week for at least 30 minutes.       Depression Screening and Follow-up Plan: Patient was screened for depression during today's encounter. They screened negative with a PHQ-9  score of 0.        Chief Complaint   Patient presents with   • Annual Exam       History of Present Illness     Adult Annual Physical:  Patient presents for annual physical. Alejandrina Aranda is a 44 y.o. female who presents today for a routine physical.  She is feeling well overall and has no new complaints today.  She is doing well on her current medications.    There is some hoarseness today- there is no sore throat and she does not feel sick.  She has hoarseness.  The patient denies any chest pain, shortness of breath, or palpitations.  There is no WOOD, PND, or orthopnea.  There is no edema.  There are no headaches or visual changes.  There is no lightheadedness, dizziness, or fainting spells.  The patient currently denies any nausea, vomiting, or GERD symptoms.  she has normal bowel movements and normal urine output.  she has a normal appetite.        .     Diet and Physical Activity:  - Diet/Nutrition: well balanced diet and consuming 3-5 servings of fruits/vegetables daily.  - Exercise: no formal exercise and moderate cardiovascular exercise.    Depression Screening:    - PHQ-9 Score: 0    General Health:  - Sleep: sleeps poorly. She has perimenopausal symptoms and hot flashes.  - Hearing: normal hearing bilateral ears.  - Vision: no vision problems, goes for regular eye exams and wears contacts.  - Dental: regular dental visits and brushes teeth twice daily.    /GYN Health:  - Follows with GYN: yes.   - Menopause: perimenopausal.   - History of STDs: no    Advanced Care Planning:  - Has an advanced directive?: no    - Has a durable medical POA?: no    - ACP document given to patient?: no      Review of Systems   Constitutional: Negative.    HENT: Negative.     Eyes: Negative.    Respiratory: Negative.     Cardiovascular: Negative.    Gastrointestinal: Negative.    Endocrine: Negative.    Genitourinary: Negative.    Musculoskeletal: Negative.    Skin: Negative.    Allergic/Immunologic: Negative.     Neurological: Negative.    Hematological: Negative.    Psychiatric/Behavioral: Negative.       Medical History Reviewed by provider this encounter:  Tobacco  Allergies  Meds  Problems  Med Hx  Surg Hx  Fam Hx     .  Current Outpatient Medications on File Prior to Visit   Medication Sig Dispense Refill   • acetaminophen (TYLENOL) 500 mg tablet Take 500 mg by mouth every 6 (six) hours as needed for mild pain As needed     • albuterol (ProAir HFA) 90 mcg/act inhaler Inhale 2 puffs every 4 (four) hours as needed for wheezing 18 g 3   • amLODIPine (NORVASC) 5 mg tablet TAKE 1 TABLET (5 MG TOTAL) BY MOUTH DAILY. 90 tablet 1   • Azelastine HCl 137 MCG/SPRAY SOLN 1-2 sprays twice a day 90 mL 3   • budesonide-formoterol (Symbicort) 160-4.5 mcg/act inhaler Inhale 2 puffs 2 (two) times a day Rinse mouth after use. 10.2 g 5   • calcium carbonate (OS-MAUREEN) 600 MG tablet Take 600 mg by mouth daily     • Cetirizine HCl 10 MG CAPS Take 1 capsule by mouth daily as needed      • ibuprofen (MOTRIN) 400 mg tablet Take 400 mg by mouth if needed     • ketoconazole (NIZORAL) 2 % shampoo LATHER ON SCALP, LET IT SIT FOR 2 TO 5 MINUTES, THEN RINSE. USE 3 TIMES A WEEK (Patient taking differently: if needed)     • LORazepam (ATIVAN) 0.5 mg tablet Take 1 tablet (0.5 mg total) by mouth every 6 (six) hours as needed for anxiety 30 tablet 0   • metFORMIN (GLUCOPHAGE) 1000 MG tablet Take 1 tab QAM x 7 days, then 1 tab QAM + 0.5 QPM x 7 days, then 1 tab BID (Patient taking differently: Take 1 tab QAM x 7 days, then 1 tab QAM + 0.5 QPM x 7 days, then 1 tab BID     as of 2/12/25 1,000 mg in AM and 1,000 mg PM) 200 tablet 0   • mometasone (NASONEX) 50 mcg/act nasal spray SPRAY 2 SPRAYS INTO EACH NOSTRIL EVERY DAY 51 g 2   • montelukast (SINGULAIR) 10 mg tablet Take 1 tablet (10 mg total) by mouth daily at bedtime 90 tablet 3   • Multiple Vitamin (MULTIVITAMIN PO) Take by mouth in the morning     • rosuvastatin (CRESTOR) 10 MG tablet TAKE 1  "TABLET BY MOUTH EVERY DAY 90 tablet 1   • sertraline (ZOLOFT) 50 mg tablet TAKE 1 TABLET BY MOUTH EVERY DAY 90 tablet 1   • Spacer/Aero-Holding Chambers (Vortex Valved Holding Chamber) HARMAN Use 2 (two) times a day 1 each 0   • topiramate (TOPAMAX) 50 MG tablet TAKE 1 TAB BY MOUTH DAILY IN AM X 7 DAYS, THEN 1 TAB TWICE A DAY X 7 DAYS, THEN 2 TAB IN AM WITH 1 TAB IN PM X 7 DAYS, THEN 2 TABS TWICE A DAY (Patient taking differently: Take 50 mg by mouth TAKE 1 TAB BY MOUTH DAILY IN AM X 7 DAYS, THEN 1 TAB TWICE A DAY X 7 DAYS, THEN 2 TAB IN AM WITH 1 TAB IN PM X 7 DAYS, THEN 2 TABS TWICE A DAY  as of 2/12/25 2 tabs AM and 2 tabs PM) 360 tablet 1     No current facility-administered medications on file prior to visit.      Social History     Tobacco Use   • Smoking status: Never     Passive exposure: Past   • Smokeless tobacco: Never   Vaping Use   • Vaping status: Never Used   Substance and Sexual Activity   • Alcohol use: Yes     Alcohol/week: 1.0 standard drink of alcohol     Types: 1 Standard drinks or equivalent per week     Comment: socially once a month   • Drug use: No   • Sexual activity: Yes     Partners: Male     Birth control/protection: Female Sterilization       Objective   /82   Pulse 68   Temp 98.1 °F (36.7 °C) (Tympanic)   Resp 16   Ht 5' 7\" (1.702 m)   Wt (!) 140 kg (308 lb)   LMP 05/15/2019 (Approximate)   SpO2 98%   BMI 48.24 kg/m²     Physical Exam  Constitutional:       General: She is not in acute distress.     Appearance: Normal appearance. She is well-developed. She is not diaphoretic.   HENT:      Head: Normocephalic and atraumatic.      Right Ear: Tympanic membrane, ear canal and external ear normal.      Left Ear: Tympanic membrane, ear canal and external ear normal.      Nose: Nose normal.      Mouth/Throat:      Mouth: Mucous membranes are moist.      Pharynx: Oropharynx is clear. No oropharyngeal exudate.   Eyes:      General: No scleral icterus.        Right eye: No discharge. "         Left eye: No discharge.      Extraocular Movements: Extraocular movements intact.      Pupils: Pupils are equal, round, and reactive to light.   Neck:      Thyroid: No thyromegaly.      Vascular: No JVD.      Trachea: No tracheal deviation.   Cardiovascular:      Rate and Rhythm: Normal rate and regular rhythm.      Heart sounds: Normal heart sounds. No murmur heard.     No friction rub. No gallop.   Pulmonary:      Effort: Pulmonary effort is normal. No respiratory distress.      Breath sounds: Normal breath sounds. No wheezing or rales.   Chest:      Chest wall: No tenderness.   Abdominal:      General: Bowel sounds are normal. There is no distension.      Palpations: Abdomen is soft. There is no mass.      Tenderness: There is no abdominal tenderness. There is no guarding or rebound.      Hernia: No hernia is present.   Musculoskeletal:         General: No tenderness or deformity. Normal range of motion.      Cervical back: Normal range of motion and neck supple.   Lymphadenopathy:      Cervical: No cervical adenopathy.   Skin:     General: Skin is warm and dry.      Coloration: Skin is not pale.      Findings: No erythema or rash.   Neurological:      Mental Status: She is alert and oriented to person, place, and time.      Cranial Nerves: No cranial nerve deficit.      Sensory: No sensory deficit.      Motor: No abnormal muscle tone.      Coordination: Coordination normal.      Deep Tendon Reflexes: Reflexes normal.   Psychiatric:         Mood and Affect: Mood normal.         Behavior: Behavior normal.         Thought Content: Thought content normal.         Judgment: Judgment normal.       Administrative Statements   I have spent a total time of 20 minutes in caring for this patient on the day of the visit/encounter including Prognosis, Risks and benefits of tx options, Instructions for management, Patient and family education, Importance of tx compliance, Risk factor reductions, Impressions,  Counseling / Coordination of care, Documenting in the medical record, Reviewing / ordering tests, medicine, procedures  , and Obtaining or reviewing history  .    not applicable

## 2025-02-12 NOTE — ASSESSMENT & PLAN NOTE
The patient will continue with the topiramate and the metformin.  She is going to check with her insurance to see if coverage has improved with the GLP-1 agonists as an option.  Orders:  •  CBC and differential; Future  •  Comprehensive metabolic panel; Future  •  LDL cholesterol, direct; Future  •  Lipid panel; Future  •  TSH, 3rd generation with Free T4 reflex; Future  •  UA (URINE) with reflex to Scope; Future  •  Hemoglobin A1C; Future

## 2025-02-14 NOTE — ASSESSMENT & PLAN NOTE
Patient's blood pressure stable on her current medication.  We have made no changes today.  Orders:  •  CBC and differential; Future  •  Comprehensive metabolic panel; Future  •  LDL cholesterol, direct; Future  •  Lipid panel; Future  •  TSH, 3rd generation with Free T4 reflex; Future  •  UA (URINE) with reflex to Scope; Future  •  Hemoglobin A1C; Future  •  Cologuard

## 2025-02-28 LAB — COLOGUARD RESULT REPORTABLE: NEGATIVE

## 2025-03-02 ENCOUNTER — RESULTS FOLLOW-UP (OUTPATIENT)
Dept: FAMILY MEDICINE CLINIC | Facility: CLINIC | Age: 45
End: 2025-03-02

## 2025-03-16 ENCOUNTER — OFFICE VISIT (OUTPATIENT)
Dept: URGENT CARE | Facility: CLINIC | Age: 45
End: 2025-03-16
Payer: COMMERCIAL

## 2025-03-16 VITALS
SYSTOLIC BLOOD PRESSURE: 118 MMHG | RESPIRATION RATE: 19 BRPM | DIASTOLIC BLOOD PRESSURE: 74 MMHG | OXYGEN SATURATION: 97 % | HEART RATE: 81 BPM | TEMPERATURE: 97.8 F

## 2025-03-16 DIAGNOSIS — M72.2 PLANTAR FASCIITIS: Primary | ICD-10-CM

## 2025-03-16 PROCEDURE — 99213 OFFICE O/P EST LOW 20 MIN: CPT | Performed by: PHYSICIAN ASSISTANT

## 2025-03-16 RX ORDER — MELOXICAM 15 MG/1
15 TABLET ORAL DAILY
Qty: 30 TABLET | Refills: 0 | Status: SHIPPED | OUTPATIENT
Start: 2025-03-16

## 2025-03-16 NOTE — PATIENT INSTRUCTIONS
"Take Meloxicam in place of Ibuprofen.  Keep appt with podiatrist.  Cold compresses or massage with rolling  bottom of foot on frozen water bottle may be soothing.  Cane if needed.  May benefit from some more rigid arch supports.      Patient Education     Plantar fasciitis   The Basics   Written by the doctors and editors at [facility]   What is plantar fasciitis? -- This is when a part of the foot called the \"plantar fascia\" gets irritated. The plantar fascia is a tough band of tissue that connects the heel bone to the toes (figure 1). Plantar fasciitis causes pain in the heel and bottom of the foot.  Plantar fasciitis is very common. It often affects people who run, jump, or stand for long periods. Most people get better within a year even without treatment.  What are the symptoms of plantar fasciitis? -- The most common symptom is pain under the heel and sole (bottom) of the foot.  The pain is often worst when you first get out of bed in the morning. It can also be bad when you get up after being seated for some time.  What can I do own my own to feel better? -- You can:   Rest - Rest your foot to help it heal. But don't completely stop being active. Doing that can lead to more pain and stiffness in the long run.   Ice your foot - Putting ice on your heel for 20 minutes up to 4 times a day might relieve pain. Put a thin towel between the ice and your skin. Icing and massaging your foot before exercise might also help.   Do special foot exercises - Certain exercises can help with heel pain (figure 2 and figure 3 and figure 4 and figure 5). Do these exercises every day.   Take pain medicines - If your pain is severe, you can try taking over-the-counter pain medicines. Examples include ibuprofen (sample brand names: Advil, Motrin) and naproxen (sample brand name: Aleve). But if you have other medical conditions or already take other medicines, ask your doctor or nurse before taking new pain medicines.   Wear sturdy " "shoes - Sneakers with a lot of cushion and good arch and heel support are best. Shoes with rigid soles can also help. Adding padded or gel heel inserts to your shoes might help, too.   Wear splints at night - Some people feel better if they wear a splint while they sleep that keeps their foot straight. These splints are sold in pharmacies and medical supply stores.  Is there a test for plantar fasciitis? -- No. But your doctor or nurse should be able to tell if you have it by learning about your symptoms and doing an exam. They might suggest an X-ray, or other tests to check whether your symptoms might be caused by something else.  How is plantar fasciitis treated? -- The first step is to try the things you can do on your own. If you do not get better, or your symptoms are severe, your doctor or nurse might suggest:   Taping up your foot in a special way that helps the support the foot (picture 1)   Special shoe inserts made to fit your foot   Shots (that go into your foot) of a medicine called a steroid, which can help with the pain   Putting a splint over your foot and ankle   Surgery (this is only an option for some people who do not get better with other treatments)  Some doctors also suggest a treatment called \"shock wave therapy.\" This treatment is painful and has not been proven to work.  How can I prevent getting heel pain again? -- To reduce the chances that your pain will come back:   Wear shoes that fit well, have a lot of cushion, and support your heel and ankle.   Do not wear slippers, flip-flops, slip-ons, or poorly fitted shoes.   Do not go barefoot.   Do not wear worn-out shoes.  All topics are updated as new evidence becomes available and our peer review process is complete.  This topic retrieved from [employer] on: Mar 31, 2024.  Topic 36689 Version 13.0  Release: 32.2.4 - C32.89  © 2024 UpToDate, Inc. and/or its affiliates. All rights reserved.  figure 1: Plantar fasciitis     The plantar fascia is " a tough band of tissue that connects the heel bone to the toes.  Graphic 23019 Version 8.0  figure 2: Heel raises     Standon a step or book, with your heels hanging off of the edge. Hold onto a counter,chair, or handrail to help with balance. Raise up onto your toes, and slowlylower your heel. Start with both feet at the same time. Then, when you can, do 1leg at a time. You can also place a towel under your toes. Repeat 10 to 15times, 1 to 3 times each day.  Graphic 117567 Version 1.0  figure 3: Seated calf stretch     Sitin a chair. Bend 1 leg, and rest the outside of your foot on your other knee.Grab your foot, and pull your toes and foot toward your knee until you feel astretch along the bottom of your foot. Repeat with your other foot. Repeat thisstretch 2 to 3 times, 1 to 3 times each day.  Graphic 929160 Version 1.0  figure 4: Ankle circles     Rest your leg on something so your calf is supported and your foot is in the air.  (A) Point and flex your foot a few times.  (B) Then, make a few circles with your foot by rotating your ankle.  Make a few ankle circles going in the other direction. Repeatthis stretch 2 to 3 times, 1 to 3 times each day.  Graphic 22779 Version 2.0  figure 5: Toe curls     Curl your toes around the edge of a book. Then, straighten them. Repeat over and over for 2 minutes, 2 times each day.  Graphic 78395 Version 3.0  picture 1: Foot taping for plantar fasciitis     Taping the foot like this sometimes helps with plantar fasciitis. You can use sports tape, available at pharmacies.  Step 1: Wrap a strip of tape around the foot, at the level of the ball of the foot.  Step 2: Wrap a second strip of tape around the heel, starting just below the pinky toe, around the sides of the heel, and back up to the first strip of tape.  Step 3: Wrap a third strip of tape around the heel, starting just below the pinky toe, like you did in step 2. This time, [place] the heel and wrap the tape in a  crisscross, so that it ends just below the big toe.  Step 4: Repeat step 3. The tape does not need to align perfectly. The tape can stay in place for 1 week.  Graphic 56117 Version 5.0  Consumer Information Use and Disclaimer   Disclaimer: This generalized information is a limited summary of diagnosis, treatment, and/or medication information. It is not meant to be comprehensive and should be used as a tool to help the user understand and/or assess potential diagnostic and treatment options. It does NOT include all information about conditions, treatments, medications, side effects, or risks that may apply to a specific patient. It is not intended to be medical advice or a substitute for the medical advice, diagnosis, or treatment of a health care provider based on the health care provider's examination and assessment of a patient's specific and unique circumstances. Patients must speak with a health care provider for complete information about their health, medical questions, and treatment options, including any risks or benefits regarding use of medications. This information does not endorse any treatments or medications as safe, effective, or approved for treating a specific patient. UpToDate, Inc. and its affiliates disclaim any warranty or liability relating to this information or the use thereof.The use of this information is governed by the Terms of Use, available at https://www.wolterskluwer.com/en/know/clinical-effectiveness-terms. 2024© UpToDate, Inc. and its affiliates and/or licensors. All rights reserved.  Copyright   © 2024 UpToDate, Inc. and/or its affiliates. All rights reserved.

## 2025-03-16 NOTE — PROGRESS NOTES
"[facility]    NAME: Alejandrina Aranda is a 44 y.o. female  : 1980    MRN: [id number]  DATE: 2025  TIME: 1:18 PM    Assessment and Plan   Plantar fasciitis [M72.2]  1. Plantar fasciitis  meloxicam (Mobic) 15 mg tablet          Prescription drug management: Yes.    Patient does have history of hypertension.  Well-controlled.  Consideration made with regard to prescribing nonsteroidal.    Patient Instructions     Patient Instructions   Take Meloxicam in place of Ibuprofen.  Keep appt with podiatrist.  Cold compresses or massage with rolling  bottom of foot on frozen water bottle may be soothing.  Cane if needed.  May benefit from some more rigid arch supports.      Patient Education     Plantar fasciitis   The Basics   Written by the doctors and editors at [facility]   What is plantar fasciitis? -- This is when a part of the foot called the \"plantar fascia\" gets irritated. The plantar fascia is a tough band of tissue that connects the heel bone to the toes (figure 1). Plantar fasciitis causes pain in the heel and bottom of the foot.  Plantar fasciitis is very common. It often affects people who run, jump, or stand for long periods. Most people get better within a year even without treatment.  What are the symptoms of plantar fasciitis? -- The most common symptom is pain under the heel and sole (bottom) of the foot.  The pain is often worst when you first get out of bed in the morning. It can also be bad when you get up after being seated for some time.  What can I do own my own to feel better? -- You can:   Rest - Rest your foot to help it heal. But don't completely stop being active. Doing that can lead to more pain and stiffness in the long run.   Ice your foot - Putting ice on your heel for 20 minutes up to 4 times a day might relieve pain. Put a thin towel between the ice and your skin. Icing and massaging your foot before exercise might also help.   Do special foot exercises - Certain " "exercises can help with heel pain (figure 2 and figure 3 and figure 4 and figure 5). Do these exercises every day.   Take pain medicines - If your pain is severe, you can try taking over-the-counter pain medicines. Examples include ibuprofen (sample brand names: Advil, Motrin) and naproxen (sample brand name: Aleve). But if you have other medical conditions or already take other medicines, ask your doctor or nurse before taking new pain medicines.   Wear sturdy shoes - Sneakers with a lot of cushion and good arch and heel support are best. Shoes with rigid soles can also help. Adding padded or gel heel inserts to your shoes might help, too.   Wear splints at night - Some people feel better if they wear a splint while they sleep that keeps their foot straight. These splints are sold in pharmacies and medical supply stores.  Is there a test for plantar fasciitis? -- No. But your doctor or nurse should be able to tell if you have it by learning about your symptoms and doing an exam. They might suggest an X-ray, or other tests to check whether your symptoms might be caused by something else.  How is plantar fasciitis treated? -- The first step is to try the things you can do on your own. If you do not get better, or your symptoms are severe, your doctor or nurse might suggest:   Taping up your foot in a special way that helps the support the foot (picture 1)   Special shoe inserts made to fit your foot   Shots (that go into your foot) of a medicine called a steroid, which can help with the pain   Putting a splint over your foot and ankle   Surgery (this is only an option for some people who do not get better with other treatments)  Some doctors also suggest a treatment called \"shock wave therapy.\" This treatment is painful and has not been proven to work.  How can I prevent getting heel pain again? -- To reduce the chances that your pain will come back:   Wear shoes that fit well, have a lot of cushion, and support your " heel and ankle.   Do not wear slippers, flip-flops, slip-ons, or poorly fitted shoes.   Do not go barefoot.   Do not wear worn-out shoes.  All topics are updated as new evidence becomes available and our peer review process is complete.  This topic retrieved from [employer] on: Mar 31, 2024.  Topic 65873 Version 13.0  Release: 32.2.4 - C32.89  © 2024 UpToDate, Inc. and/or its affiliates. All rights reserved.  figure 1: Plantar fasciitis     The plantar fascia is a tough band of tissue that connects the heel bone to the toes.  Graphic 19407 Version 8.0  figure 2: Heel raises     Standon a step or book, with your heels hanging off of the edge. Hold onto a counter,chair, or handrail to help with balance. Raise up onto your toes, and slowlylower your heel. Start with both feet at the same time. Then, when you can, do 1leg at a time. You can also place a towel under your toes. Repeat 10 to 15times, 1 to 3 times each day.  Graphic 829033 Version 1.0  figure 3: Seated calf stretch     Sitin a chair. Bend 1 leg, and rest the outside of your foot on your other knee.Grab your foot, and pull your toes and foot toward your knee until you feel astretch along the bottom of your foot. Repeat with your other foot. Repeat thisstretch 2 to 3 times, 1 to 3 times each day.  Graphic 212553 Version 1.0  figure 4: Ankle circles     Rest your leg on something so your calf is supported and your foot is in the air.  (A) Point and flex your foot a few times.  (B) Then, make a few circles with your foot by rotating your ankle.  Make a few ankle circles going in the other direction. Repeatthis stretch 2 to 3 times, 1 to 3 times each day.  Graphic 47242 Version 2.0  figure 5: Toe curls     Curl your toes around the edge of a book. Then, straighten them. Repeat over and over for 2 minutes, 2 times each day.  Graphic 63376 Version 3.0  picture 1: Foot taping for plantar fasciitis     Taping the foot like this sometimes helps with plantar fasciitis.  You can use sports tape, available at pharmacies.  Step 1: Wrap a strip of tape around the foot, at the level of the ball of the foot.  Step 2: Wrap a second strip of tape around the heel, starting just below the pinky toe, around the sides of the heel, and back up to the first strip of tape.  Step 3: Wrap a third strip of tape around the heel, starting just below the pinky toe, like you did in step 2. This time, [place] the heel and wrap the tape in a crisscross, so that it ends just below the big toe.  Step 4: Repeat step 3. The tape does not need to align perfectly. The tape can stay in place for 1 week.  Graphic 64532 Version 5.0  Consumer Information Use and Disclaimer   Disclaimer: This generalized information is a limited summary of diagnosis, treatment, and/or medication information. It is not meant to be comprehensive and should be used as a tool to help the user understand and/or assess potential diagnostic and treatment options. It does NOT include all information about conditions, treatments, medications, side effects, or risks that may apply to a specific patient. It is not intended to be medical advice or a substitute for the medical advice, diagnosis, or treatment of a health care provider based on the health care provider's examination and assessment of a patient's specific and unique circumstances. Patients must speak with a health care provider for complete information about their health, medical questions, and treatment options, including any risks or benefits regarding use of medications. This information does not endorse any treatments or medications as safe, effective, or approved for treating a specific patient. UpToDate, Inc. and its affiliates disclaim any warranty or liability relating to this information or the use thereof.The use of this information is governed by the Terms of Use, available at https://www.[employer].com/en/know/clinical-effectiveness-terms. 2024© UpToDate, Inc. and its  affiliates and/or licensors. All rights reserved.  Copyright   © 2024 UpToDate, Inc. and/or its affiliates. All rights reserved.      Chief Complaint     Chief Complaint   Patient presents with    Foot Pain     L Foot pain (ongoing for 2 weeks), concerned for plantar fasciitis, pain is in the arch and on the side of the heel. 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 Made a podiatrist appt & appt is next Monday. Shooting pains to the L knee/hip from compensating.        History of Present Illness   Alejandrina Aranda presents to the clinic c/o  Patient comes in with pain left foot.    Started: About 2 weeks ago with mild pain left heel.  No specific known injury.  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  Pain has kind of been worse off and on but yesterday she was out prom dress shopping and did a lot of standing and walking.  Pain this morning a lot worse.  Associated signs and symptoms: No obvious swelling redness heat to her acute lesions.  Now pain going up into her knee and hip and back.  Modifying factors: Limping.  Did get Dr. Viramontes's cushion and that seems to make it feel little bit better.  Has taken some ibuprofen or Tylenol off and on.    Has appointment with podiatrist a week from Monday.    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  Will be doing a lot of walking at that time.          Review of Systems   Review of Systems   Constitutional:  Positive for activity change.   Musculoskeletal:  Positive for arthralgias, gait problem and myalgias.   Skin:  Negative for color change.       Current Medications     Long-Term Medications   Medication Sig Dispense Refill    meloxicam (Mobic) 15 mg tablet Take 1 tablet (15 mg total) by mouth daily 30 tablet 0    amLODIPine (NORVASC) 5 mg tablet TAKE 1 TABLET (5 MG TOTAL) BY MOUTH DAILY. 90 tablet 1    budesonide-formoterol (Symbicort) 160-4.5 mcg/act inhaler Inhale 2 puffs 2 (two) times a day Rinse mouth after use. 10.2 g 5     calcium carbonate (OS-MAUREEN) 600 MG tablet Take 600 mg by mouth daily      Cetirizine HCl 10 MG CAPS Take 1 capsule by mouth daily as needed       ketoconazole (NIZORAL) 2 % shampoo LATHER ON SCALP, LET IT SIT FOR 2 TO 5 MINUTES, THEN RINSE. USE 3 TIMES A WEEK (Patient taking differently: if needed)      LORazepam (ATIVAN) 0.5 mg tablet Take 1 tablet (0.5 mg total) by mouth every 6 (six) hours as needed for anxiety 30 tablet 0    metFORMIN (GLUCOPHAGE) 1000 MG tablet Take 1 tab QAM x 7 days, then 1 tab QAM + 0.5 QPM x 7 days, then 1 tab BID (Patient taking differently: Take 1 tab QAM x 7 days, then 1 tab QAM + 0.5 QPM x 7 days, then 1 tab BID     as of 2/12/25 1,000 mg in AM and 1,000 mg PM) 200 tablet 0    mometasone (NASONEX) 50 mcg/act nasal spray SPRAY 2 SPRAYS INTO EACH NOSTRIL EVERY DAY 51 g 2    montelukast (SINGULAIR) 10 mg tablet Take 1 tablet (10 mg total) by mouth daily at bedtime 90 tablet 3    rosuvastatin (CRESTOR) 10 MG tablet TAKE 1 TABLET BY MOUTH EVERY DAY 90 tablet 1    sertraline (ZOLOFT) 50 mg tablet TAKE 1 TABLET BY MOUTH EVERY DAY 90 tablet 1    Spacer/Aero-Holding Chambers (Vortex Valved Holding Chamber) HARMAN Use 2 (two) times a day 1 each 0    topiramate (TOPAMAX) 50 MG tablet TAKE 1 TAB BY MOUTH DAILY IN AM X 7 DAYS, THEN 1 TAB TWICE A DAY X 7 DAYS, THEN 2 TAB IN AM WITH 1 TAB IN PM X 7 DAYS, THEN 2 TABS TWICE A DAY (Patient taking differently: Take 50 mg by mouth TAKE 1 TAB BY MOUTH DAILY IN AM X 7 DAYS, THEN 1 TAB TWICE A DAY X 7 DAYS, THEN 2 TAB IN AM WITH 1 TAB IN PM X 7 DAYS, THEN 2 TABS TWICE A DAY  as of 2/12/25 2 tabs AM and 2 tabs PM) 360 tablet 1       Current Allergies     Allergies as of 03/16/2025 - Reviewed 03/16/2025   Allergen Reaction Noted    Pollen extract Allergic Rhinitis 07/20/2021          The following portions of the patient's history were reviewed and updated as appropriate: allergies, current medications, past family history, past medical history, past social  history, past surgical history and problem list.  Past Medical History:   Diagnosis Date    Allergic rhinitis     Anxiety     Asthma     Depression     Family health problem     mother and grandmother h/o blood clots after surgery    Fibroid     Fullness in ear, left     Resolved [id number]    Hypertension     Resolved [id number]    Sleep apnea     Urinary tract infection     h/o     Past Surgical History:   Procedure Laterality Date    CHOLECYSTECTOMY      HYSTERECTOMY      [place] TOTAL ABDOMINAL HYSTERECT W/WO RMVL TUBE OVARY N/A 6/18/2019    Procedure: Supracervical IWONA, removal of bilat tubes, removal of IUD, cystoscopy;  Surgeon: Geovanni Segura MD;  Location: [facility];  Service: Gynecology    TOOTH EXTRACTION       Family History   Problem Relation Age of Onset    Anxiety disorder Mother     Other Mother         Blood clots    Hypertension Mother     Hyperlipidemia Mother     Depression Mother     Alcohol abuse Father         Is in recovery.    Hypertension Brother     Anxiety disorder Maternal Grandmother     Other Maternal Grandmother         Blood clots    Depression Maternal Grandmother     Lung cancer Maternal Grandfather     Cancer Maternal Grandfather         Lung Cancer    Diabetes Paternal Grandmother         had type 2 diabetes due to weight    No Known Problems Paternal Grandfather     Ovarian cancer Maternal Aunt 68    Alcohol abuse Maternal Aunt 65    Prostate cancer Maternal Uncle     No Known Problems Paternal Aunt     Heart disease Family         cardiac disorder    Cancer Family         lung ca    Diabetes Family     Cancer Family     Cancer Maternal Aunt         Started in uterus, has spread to most of the body    Cancer Maternal Uncle         Prostate Cancer       Objective   /74   Pulse 81   Temp 97.8 °F (36.6 °C) (Tympanic)   Resp 19   LMP 05/15/2019 (Approximate)   SpO2 97%   Patient's last menstrual period was 05/15/2019 (approximate).       Physical Exam     Physical Exam  Vitals  and nursing note reviewed.   Constitutional:       General: She is not in acute distress.     Appearance: She is well-developed. She is obese. She is not ill-appearing, toxic-appearing or diaphoretic.   Cardiovascular:      Rate and Rhythm: Normal rate.   Pulmonary:      Effort: Pulmonary effort is normal. No respiratory distress.   Musculoskeletal:         General: Tenderness present. No swelling, deformity or signs of injury.      Right ankle: Normal pulse.      Right Achilles Tendon: Normal.      Left ankle: No swelling. Tenderness present over the medial malleolus, ATF ligament and AITF ligament. No lateral malleolus, posterior TF ligament, base of 5th metatarsal or proximal fibula tenderness.      Left Achilles Tendon: Normal.      Right foot: Normal range of motion and normal capillary refill. No swelling, tenderness, bony tenderness or crepitus. Normal pulse.      Left foot: Normal range of motion and normal capillary refill. Tenderness and bony tenderness present. No swelling or crepitus. Normal pulse.   Skin:     General: Skin is warm and dry.      Findings: No bruising, erythema, lesion or rash.   Neurological:      Mental Status: She is alert and oriented to person, place, and time.   Psychiatric:         Mood and Affect: Mood normal.         Behavior: Behavior normal.

## 2025-04-18 ENCOUNTER — DOCUMENTATION (OUTPATIENT)
Dept: ADMINISTRATIVE | Facility: OTHER | Age: 45
End: 2025-04-18

## 2025-04-18 ENCOUNTER — OFFICE VISIT (OUTPATIENT)
Dept: URGENT CARE | Facility: CLINIC | Age: 45
End: 2025-04-18
Payer: COMMERCIAL

## 2025-04-18 VITALS
DIASTOLIC BLOOD PRESSURE: 90 MMHG | TEMPERATURE: 97.1 F | SYSTOLIC BLOOD PRESSURE: 120 MMHG | BODY MASS INDEX: 45.99 KG/M2 | HEIGHT: 67 IN | HEART RATE: 77 BPM | OXYGEN SATURATION: 99 % | RESPIRATION RATE: 20 BRPM | WEIGHT: 293 LBS

## 2025-04-18 DIAGNOSIS — J32.9 SINOBRONCHITIS: Primary | ICD-10-CM

## 2025-04-18 DIAGNOSIS — J40 SINOBRONCHITIS: Primary | ICD-10-CM

## 2025-04-18 PROCEDURE — 99213 OFFICE O/P EST LOW 20 MIN: CPT | Performed by: PHYSICIAN ASSISTANT

## 2025-04-18 RX ORDER — PREDNISONE 20 MG/1
TABLET ORAL
Qty: 10 TABLET | Refills: 0 | Status: SHIPPED | OUTPATIENT
Start: 2025-04-18

## 2025-04-18 RX ORDER — CEFUROXIME AXETIL 500 MG/1
500 TABLET ORAL EVERY 12 HOURS SCHEDULED
Qty: 14 TABLET | Refills: 0 | Status: SHIPPED | OUTPATIENT
Start: 2025-04-18 | End: 2025-04-25

## 2025-04-18 NOTE — PROGRESS NOTES
[facility]    NAME: Alejandrina Aranda is a 44 y.o. female  : 1980    MRN: [id number]  DATE: 2025  TIME: 10:11 AM    Assessment and Plan   Sinobronchitis [J32.9, J40]  1. Sinobronchitis  predniSONE 20 mg tablet    cefuroxime (CEFTIN) 500 mg tablet          Patient Instructions     Patient Instructions   Take antibiotic as instructed.  Take prednisone as instructed.  While on prednisone do not take any ibuprofen or ibuprofen like products.  May safely take Tylenol if needed.    Continue your allergy/asthma medications.    If not improving over the next 5 to 7 days or recurrent symptoms, get in with your primary care provider as soon as possible for further evaluation.    Chief Complaint     Chief Complaint   Patient presents with    Sore Throat     Sore throat/cold symptoms last week and now loss of voice. Denies sore throat or cold symptoms currently.        History of Present Illness   Alejandrina Aranda presents to the clinic c/o  Patient comes in with loss of voice.    Started: Got a cold approximately 2 weeks ago.  Had sore throat but that settled down.  Associated signs and symptoms: Now coughing, sinus pain and pressure, nasal congestion and drainage.  Modifying factors: Allergy, asthma medicine, DayQuil, NyQuil, Advil, sinus congestion medication.  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  Recent travel: Just returned from [facility].  Said everything was blooming over there.  History asthma and severe seasonal allergies.  On multiple medications for both.    Sore Throat   Associated symptoms include congestion and coughing. Pertinent negatives include no ear discharge, ear pain or shortness of breath.       Review of Systems   Review of Systems   Constitutional:  Positive for fatigue. Negative for activity change, appetite change, chills and fever.   HENT:  Positive for congestion, postnasal drip, rhinorrhea, sinus pressure, sinus pain and  voice change. Negative for ear discharge, ear pain and sore throat.    Eyes: Negative.    Respiratory:  Positive for cough and chest tightness. Negative for shortness of breath and wheezing.    Cardiovascular: Negative.    Hematological: Negative.        Current Medications     Long-Term Medications   Medication Sig Dispense Refill    amLODIPine (NORVASC) 5 mg tablet TAKE 1 TABLET (5 MG TOTAL) BY MOUTH DAILY. 90 tablet 1    budesonide-formoterol (Symbicort) 160-4.5 mcg/act inhaler Inhale 2 puffs 2 (two) times a day Rinse mouth after use. 10.2 g 5    calcium carbonate (OS-MAUREEN) 600 MG tablet Take 600 mg by mouth daily      Cetirizine HCl 10 MG CAPS Take 1 capsule by mouth daily as needed       ketoconazole (NIZORAL) 2 % shampoo LATHER ON SCALP, LET IT SIT FOR 2 TO 5 MINUTES, THEN RINSE. USE 3 TIMES A WEEK (Patient taking differently: if needed)      LORazepam (ATIVAN) 0.5 mg tablet Take 1 tablet (0.5 mg total) by mouth every 6 (six) hours as needed for anxiety 30 tablet 0    meloxicam (Mobic) 15 mg tablet Take 1 tablet (15 mg total) by mouth daily 30 tablet 0    metFORMIN (GLUCOPHAGE) 1000 MG tablet Take 1 tab QAM x 7 days, then 1 tab QAM + 0.5 QPM x 7 days, then 1 tab BID (Patient taking differently: Take 1 tab QAM x 7 days, then 1 tab QAM + 0.5 QPM x 7 days, then 1 tab BID     as of 2/12/25 1,000 mg in AM and 1,000 mg PM) 200 tablet 0    mometasone (NASONEX) 50 mcg/act nasal spray SPRAY 2 SPRAYS INTO EACH NOSTRIL EVERY DAY 51 g 2    montelukast (SINGULAIR) 10 mg tablet Take 1 tablet (10 mg total) by mouth daily at bedtime 90 tablet 3    rosuvastatin (CRESTOR) 10 MG tablet TAKE 1 TABLET BY MOUTH EVERY DAY 90 tablet 1    sertraline (ZOLOFT) 50 mg tablet TAKE 1 TABLET BY MOUTH EVERY DAY 90 tablet 1    Spacer/Aero-Holding Chambers (Vortex Valved Holding Chamber) HARMAN Use 2 (two) times a day 1 each 0    topiramate (TOPAMAX) 50 MG tablet TAKE 1 TAB BY MOUTH DAILY IN AM X 7 DAYS, THEN 1 TAB TWICE A DAY X 7 DAYS, THEN 2 TAB  IN AM WITH 1 TAB IN PM X 7 DAYS, THEN 2 TABS TWICE A DAY (Patient taking differently: Take 50 mg by mouth TAKE 1 TAB BY MOUTH DAILY IN AM X 7 DAYS, THEN 1 TAB TWICE A DAY X 7 DAYS, THEN 2 TAB IN AM WITH 1 TAB IN PM X 7 DAYS, THEN 2 TABS TWICE A DAY  as of 2/12/25 2 tabs AM and 2 tabs PM) 360 tablet 1       Current Allergies     Allergies as of 04/18/2025 - Reviewed 04/18/2025   Allergen Reaction Noted    Pollen extract Allergic Rhinitis 07/20/2021          The following portions of the patient's history were reviewed and updated as appropriate: allergies, current medications, past family history, past medical history, past social history, past surgical history and problem list.  Past Medical History:   Diagnosis Date    Allergic rhinitis     Anxiety     Asthma     Depression     Family health problem     mother and grandmother h/o blood clots after surgery    Fibroid     Fullness in ear, left     Resolved [id number]    Hypertension     Resolved [id number]    Sleep apnea     Urinary tract infection     h/o     Past Surgical History:   Procedure Laterality Date    CHOLECYSTECTOMY      HYSTERECTOMY      [place] TOTAL ABDOMINAL HYSTERECT W/WO RMVL TUBE OVARY N/A 6/18/2019    Procedure: Supracervical IWONA, removal of bilat tubes, removal of IUD, cystoscopy;  Surgeon: Geovanni Segura MD;  Location: [facility];  Service: Gynecology    TOOTH EXTRACTION       Family History   Problem Relation Age of Onset    Anxiety disorder Mother     Other Mother         Blood clots    Hypertension Mother     Hyperlipidemia Mother     Depression Mother     Alcohol abuse Father         Is in recovery.    Hypertension Brother     Anxiety disorder Maternal Grandmother     Other Maternal Grandmother         Blood clots    Depression Maternal Grandmother     Lung cancer Maternal Grandfather     Cancer Maternal Grandfather         Lung Cancer    Diabetes Paternal Grandmother         had type 2 diabetes due to weight    No Known Problems Paternal  "Grandfather     Ovarian cancer Maternal Aunt 68    Alcohol abuse Maternal Aunt 65    Prostate cancer Maternal Uncle     No Known Problems Paternal Aunt     Heart disease Family         cardiac disorder    Cancer Family         lung ca    Diabetes Family     Cancer Family     Cancer Maternal Aunt         Started in uterus, has spread to most of the body    Cancer Maternal Uncle         Prostate Cancer       Objective   /90   Pulse 77   Temp (!) 97.1 °F (36.2 °C)   Resp 20   Ht 5' 7\" (1.702 m)   Wt (!) 139 kg (307 lb)   LMP 05/15/2019 (Approximate)   SpO2 99%   BMI 48.08 kg/m²   Patient's last menstrual period was 05/15/2019 (approximate).       Physical Exam     Physical Exam  Vitals and nursing note reviewed.   Constitutional:       General: She is not in acute distress.     Appearance: She is well-developed. She is ill-appearing. She is not toxic-appearing or diaphoretic.      Comments: Appears mildly ill but in no acute distress.  No trismus or conversational dyspnea.  Patient is hoarse.  Nasal phonation.   HENT:      Head: Normocephalic and atraumatic.      Right Ear: Tympanic membrane, ear canal and external ear normal. No drainage, swelling or tenderness. No middle ear effusion. Tympanic membrane is not erythematous.      Left Ear: Tympanic membrane, ear canal and external ear normal. No drainage, swelling or tenderness.  No middle ear effusion. Tympanic membrane is not erythematous.      Nose: Congestion present. No rhinorrhea.      Mouth/Throat:      Mouth: Mucous membranes are moist. No oral lesions.      Pharynx: Uvula midline. No pharyngeal swelling, oropharyngeal exudate, posterior oropharyngeal erythema or uvula swelling.      Tonsils: No tonsillar exudate or tonsillar abscesses. 0 on the right. 0 on the left.      Comments: Cobblestoning posterior pharynx   Eyes:      General:         Right eye: No discharge.         Left eye: No discharge.      Conjunctiva/sclera: Conjunctivae normal.     "  Pupils: Pupils are equal, round, and reactive to light.   Cardiovascular:      Rate and Rhythm: Normal rate and regular rhythm.      Heart sounds: Normal heart sounds. No murmur heard.     No friction rub. No gallop.   Pulmonary:      Effort: Pulmonary effort is normal. No respiratory distress.      Breath sounds: Normal breath sounds. No stridor. No wheezing, rhonchi or rales.   Musculoskeletal:      Cervical back: Normal range of motion and neck supple. No rigidity or tenderness.   Lymphadenopathy:      Cervical: No cervical adenopathy.   Skin:     General: Skin is warm and dry.      Coloration: Skin is not jaundiced or pale.      Findings: No rash.   Neurological:      General: No focal deficit present.      Mental Status: She is alert and oriented to person, place, and time.   Psychiatric:         Mood and Affect: Mood normal.         Behavior: Behavior normal.

## 2025-04-18 NOTE — PROGRESS NOTES
04/22/25 2:13 PM    Patient was called after the Urgent Care visit Patient has an upcoming appointment with their primary care provider on 6/9/25 to follow on an elevated Blood pressure.      Thank you.  Adrianne Gomes MA  [facility]          Shruti Bateman PA-C  P Patient Reported Team         Blood pressure elevated  Appointment department: [facility]  Appointment provider: Shruti Bateman PA-C  Blood pressure  04/18/25 1008 120/90  04/18/25 0959 120/90

## 2025-04-18 NOTE — PATIENT INSTRUCTIONS
Take antibiotic as instructed.  Take prednisone as instructed.  While on prednisone do not take any ibuprofen or ibuprofen like products.  May safely take Tylenol if needed.    Continue your allergy/asthma medications.    If not improving over the next 5 to 7 days or recurrent symptoms, get in with your primary care provider as soon as possible for further evaluation.

## 2025-04-22 VITALS — SYSTOLIC BLOOD PRESSURE: 120 MMHG | DIASTOLIC BLOOD PRESSURE: 90 MMHG

## 2025-04-30 ENCOUNTER — VBI (OUTPATIENT)
Dept: ADMINISTRATIVE | Facility: OTHER | Age: 45
End: 2025-04-30

## 2025-04-30 NOTE — TELEPHONE ENCOUNTER
04/30/25 1:31 PM     Chart reviewed for CRC: Colonoscopy ; nothing is submitted to the patient's insurance at this time.     Román Mendez MA   [facility]

## 2025-05-10 LAB
ALBUMIN SERPL-MCNC: 4 G/DL (ref 3.9–4.9)
ALP SERPL-CCNC: 90 IU/L (ref 44–121)
ALT SERPL-CCNC: 11 IU/L (ref 0–32)
APPEARANCE UR: CLEAR
AST SERPL-CCNC: 12 IU/L (ref 0–40)
BASOPHILS # BLD AUTO: 0 X10E3/UL (ref 0–0.2)
BASOPHILS NFR BLD AUTO: 0 %
BILIRUB SERPL-MCNC: 0.3 MG/DL (ref 0–1.2)
BILIRUB UR QL STRIP: NEGATIVE
BUN SERPL-MCNC: 11 MG/DL (ref 6–24)
BUN/CREAT SERPL: 16 (ref 9–23)
CALCIUM SERPL-MCNC: 9.1 MG/DL (ref 8.7–10.2)
CHLORIDE SERPL-SCNC: 106 MMOL/L (ref 96–106)
CHOLEST SERPL-MCNC: 145 MG/DL (ref 100–199)
CHOLEST/HDLC SERPL: 3.1 RATIO (ref 0–4.4)
CO2 SERPL-SCNC: 18 MMOL/L (ref 20–29)
COLOR UR: YELLOW
CREAT SERPL-MCNC: 0.68 MG/DL (ref 0.57–1)
EGFR: 109 ML/MIN/1.73
EOSINOPHIL # BLD AUTO: 0 X10E3/UL (ref 0–0.4)
EOSINOPHIL NFR BLD AUTO: 0 %
ERYTHROCYTE [DISTWIDTH] IN BLOOD BY AUTOMATED COUNT: 13.8 % (ref 11.7–15.4)
EST. AVERAGE GLUCOSE BLD GHB EST-MCNC: 131 MG/DL
GLOBULIN SER-MCNC: 2.6 G/DL (ref 1.5–4.5)
GLUCOSE SERPL-MCNC: 114 MG/DL (ref 70–99)
GLUCOSE UR QL: NEGATIVE
HBA1C MFR BLD: 6.2 % (ref 4.8–5.6)
HCT VFR BLD AUTO: 42.6 % (ref 34–46.6)
HCV AB S/CO SERPL IA: NON REACTIVE
HDLC SERPL-MCNC: 47 MG/DL
HGB BLD-MCNC: 14 G/DL (ref 11.1–15.9)
HGB UR QL STRIP: NEGATIVE
IMM GRANULOCYTES # BLD: 0 X10E3/UL (ref 0–0.1)
IMM GRANULOCYTES NFR BLD: 0 %
KETONES UR QL STRIP: NEGATIVE
LDL CALC COMMENT: ABNORMAL
LDLC SERPL CALC-MCNC: 70 MG/DL (ref 0–99)
LDLC SERPL DIRECT ASSAY-MCNC: 77 MG/DL (ref 0–99)
LEUKOCYTE ESTERASE UR QL STRIP: NEGATIVE
LYMPHOCYTES # BLD AUTO: 1.6 X10E3/UL (ref 0.7–3.1)
LYMPHOCYTES NFR BLD AUTO: 23 %
MCH RBC QN AUTO: 28.1 PG (ref 26.6–33)
MCHC RBC AUTO-ENTMCNC: 32.9 G/DL (ref 31.5–35.7)
MCV RBC AUTO: 86 FL (ref 79–97)
MICRO URNS: NORMAL
MONOCYTES # BLD AUTO: 0.4 X10E3/UL (ref 0.1–0.9)
MONOCYTES NFR BLD AUTO: 5 %
NEUTROPHILS # BLD AUTO: 5 X10E3/UL (ref 1.4–7)
NEUTROPHILS NFR BLD AUTO: 72 %
NITRITE UR QL STRIP: NEGATIVE
PH UR STRIP: 7.5 [PH] (ref 5–7.5)
PLATELET # BLD AUTO: 319 X10E3/UL (ref 150–450)
POTASSIUM SERPL-SCNC: 3.7 MMOL/L (ref 3.5–5.2)
PROT SERPL-MCNC: 6.6 G/DL (ref 6–8.5)
PROT UR QL STRIP: NEGATIVE
RBC # BLD AUTO: 4.98 X10E6/UL (ref 3.77–5.28)
SL AMB VLDL CHOLESTEROL CALC: 28 MG/DL (ref 5–40)
SODIUM SERPL-SCNC: 140 MMOL/L (ref 134–144)
SP GR UR: 1.01 (ref 1–1.03)
TRIGL SERPL-MCNC: 168 MG/DL (ref 0–149)
TSH SERPL DL<=0.005 MIU/L-ACNC: 1.38 UIU/ML (ref 0.45–4.5)
UROBILINOGEN UR STRIP-ACNC: 0.2 MG/DL (ref 0.2–1)
WBC # BLD AUTO: 7 X10E3/UL (ref 3.4–10.8)

## 2025-06-13 DIAGNOSIS — E66.813 CLASS 3 SEVERE OBESITY DUE TO EXCESS CALORIES WITHOUT SERIOUS COMORBIDITY WITH BODY MASS INDEX (BMI) OF 50.0 TO 59.9 IN ADULT: ICD-10-CM

## 2025-06-14 DIAGNOSIS — E78.2 MIXED HYPERLIPIDEMIA: ICD-10-CM

## 2025-06-14 DIAGNOSIS — E66.813 CLASS 3 SEVERE OBESITY DUE TO EXCESS CALORIES WITHOUT SERIOUS COMORBIDITY WITH BODY MASS INDEX (BMI) OF 50.0 TO 59.9 IN ADULT: ICD-10-CM

## 2025-06-14 DIAGNOSIS — F33.9 DEPRESSION, RECURRENT (HCC): ICD-10-CM

## 2025-06-14 DIAGNOSIS — Z00.00 ANNUAL PHYSICAL EXAM: ICD-10-CM

## 2025-06-14 DIAGNOSIS — F41.1 GENERALIZED ANXIETY DISORDER: ICD-10-CM

## 2025-06-14 RX ORDER — AMLODIPINE BESYLATE 5 MG/1
5 TABLET ORAL DAILY
Qty: 90 TABLET | Refills: 1 | Status: SHIPPED | OUTPATIENT
Start: 2025-06-14

## 2025-06-14 RX ORDER — ROSUVASTATIN CALCIUM 10 MG/1
10 TABLET, COATED ORAL DAILY
Qty: 90 TABLET | Refills: 1 | Status: SHIPPED | OUTPATIENT
Start: 2025-06-14

## 2025-06-14 RX ORDER — TOPIRAMATE 50 MG/1
TABLET, FILM COATED ORAL
Qty: 360 TABLET | Refills: 1 | Status: SHIPPED | OUTPATIENT
Start: 2025-06-14 | End: 2025-06-16 | Stop reason: SDUPTHER

## 2025-06-16 ENCOUNTER — OFFICE VISIT (OUTPATIENT)
Dept: FAMILY MEDICINE CLINIC | Facility: CLINIC | Age: 45
End: 2025-06-16
Payer: COMMERCIAL

## 2025-06-16 VITALS
WEIGHT: 293 LBS | BODY MASS INDEX: 45.99 KG/M2 | OXYGEN SATURATION: 98 % | RESPIRATION RATE: 16 BRPM | TEMPERATURE: 97.7 F | HEART RATE: 93 BPM | HEIGHT: 67 IN | DIASTOLIC BLOOD PRESSURE: 78 MMHG | SYSTOLIC BLOOD PRESSURE: 120 MMHG

## 2025-06-16 DIAGNOSIS — F41.1 GENERALIZED ANXIETY DISORDER: ICD-10-CM

## 2025-06-16 DIAGNOSIS — F33.9 DEPRESSION, RECURRENT (HCC): ICD-10-CM

## 2025-06-16 DIAGNOSIS — Z12.31 ENCOUNTER FOR SCREENING MAMMOGRAM FOR BREAST CANCER: ICD-10-CM

## 2025-06-16 DIAGNOSIS — I10 PRIMARY HYPERTENSION: Primary | ICD-10-CM

## 2025-06-16 DIAGNOSIS — E66.813 CLASS 3 SEVERE OBESITY DUE TO EXCESS CALORIES WITHOUT SERIOUS COMORBIDITY WITH BODY MASS INDEX (BMI) OF 45.0 TO 49.9 IN ADULT: ICD-10-CM

## 2025-06-16 DIAGNOSIS — R73.03 PREDIABETES: ICD-10-CM

## 2025-06-16 DIAGNOSIS — G47.33 OBSTRUCTIVE SLEEP APNEA TREATED WITH CONTINUOUS POSITIVE AIRWAY PRESSURE (CPAP): ICD-10-CM

## 2025-06-16 PROCEDURE — 99214 OFFICE O/P EST MOD 30 MIN: CPT | Performed by: FAMILY MEDICINE

## 2025-06-16 RX ORDER — TOPIRAMATE 50 MG/1
TABLET, FILM COATED ORAL
Start: 2025-06-16

## 2025-06-16 NOTE — ASSESSMENT & PLAN NOTE
The patient will continue with the current dose of her metformin along with continue to work on her diet and exercise.

## 2025-06-16 NOTE — ASSESSMENT & PLAN NOTE
We are going to refer the patient to the weight management program to help explore different options to help her lose weight.  She will follow-up with them in regards to pharmaceutical options to lose weight.  In the meantime she will continue with her metformin and topiramate.    Orders:  •  Ambulatory Referral to Weight Management; Future  •  topiramate (TOPAMAX) 50 MG tablet; 1 tab AM and 1 Tab PM  •  metFORMIN (GLUCOPHAGE) 1000 MG tablet; Take 1 tablet (1,000 mg total) by mouth in the morning and 1 tablet (1,000 mg total) before bedtime.

## 2025-06-16 NOTE — ASSESSMENT & PLAN NOTE
Patient's blood pressure is stable on her current medication.  We have made no changes today.

## 2025-06-16 NOTE — PROGRESS NOTES
Name: Alejandrina Aranda      : 1980      MRN: [id number]  Encounter Provider: Iza Jeff DO  Encounter Date: 2025   Encounter department: [facility]  :  Assessment & Plan  Primary hypertension  Patient's blood pressure is stable on her current medication.  We have made no changes today.          Obstructive sleep apnea treated with continuous positive airway pressure (CPAP)  The patient has not been using her CPAP and is actually due to follow-up with her sleep specialist.  She will be following up with him soon because she likely needs an up-to-date sleep study.  She is going to look into the possibility getting a GLP-1 agonist covered due to her sleep apnea but needs a current sleep study.  She will call them and schedule accordingly.       Class 3 severe obesity due to excess calories without serious comorbidity with body mass index (BMI) of 45.0 to 49.9 in adult  We are going to refer the patient to the weight management program to help explore different options to help her lose weight.  She will follow-up with them in regards to pharmaceutical options to lose weight.  In the meantime she will continue with her metformin and topiramate.    Orders:  •  Ambulatory Referral to Weight Management; Future  •  topiramate (TOPAMAX) 50 MG tablet; 1 tab AM and 1 Tab PM  •  metFORMIN (GLUCOPHAGE) 1000 MG tablet; Take 1 tablet (1,000 mg total) by mouth in the morning and 1 tablet (1,000 mg total) before bedtime.    Prediabetes  The patient will continue with the current dose of her metformin along with continue to work on her diet and exercise.       Depression, recurrent (HCC)  The patient will continue with the current dose of her sertraline.  We have made no changes today.  We will continue to monitor closely.  We will see her back in the office as scheduled.        Generalized anxiety disorder  The patient's anxiety is well-controlled with the sertraline.  We have made no  changes today.        Encounter for screening mammogram for breast cancer    Orders:  •  Mammo screening bilateral w 3d and cad; Future          Depression Screening and Follow-up Plan: Patient was screened for depression during today's encounter. They screened negative with a PHQ-9 score of 0.        Chief Complaint   Patient presents with   • Follow-up     4 month, review labs, patient was checking to see if Insurance covers GLP-1       History of Present Illness   Alejandrina Aranda is a 44 y.o. female who presents today for follow-up of her hypertension, obesity, depression, anxiety, and prediabetes.  She has been eating more recently.  She is checking into coverage with the GLP-1- she does have Sleep Apnea and needs to get back to see her sleep specialist- she has not been using the CPAP.  The Metformin is not helping with weight loss- she has been eating more.  She is walking and more active.  She is not going through the Weight Management Program.  She is interested in looking into this.  The patient denies any chest pain, shortness of breath, or palpitations.  There is no WOOD, PND, or orthopnea.  There is no edema.  There are no headaches or visual changes.  There is no lightheadedness, dizziness, or fainting spells.  She is feeling the same.      The patient currently denies any nausea, vomiting, or GERD symptoms.  she has normal bowel movements and normal urine output.  she has a normal appetite.          Hypertension  This is a chronic problem. The current episode started more than 1 year ago. The problem is unchanged. The problem is controlled. Pertinent negatives include no anxiety, blurred vision, chest pain, headaches, malaise/fatigue, neck pain, orthopnea, palpitations, peripheral edema, PND, shortness of breath or sweats.     Review of Systems   Constitutional: Negative.  Negative for malaise/fatigue.   HENT: Negative.     Eyes: Negative.  Negative for blurred vision.   Respiratory: Negative.  Negative  "for shortness of breath.    Cardiovascular: Negative.  Negative for chest pain, palpitations, orthopnea and PND.   Gastrointestinal: Negative.    Endocrine: Negative.    Genitourinary: Negative.    Musculoskeletal: Negative.  Negative for neck pain.   Skin: Negative.    Allergic/Immunologic: Negative.    Neurological: Negative.  Negative for headaches.   Hematological: Negative.    Psychiatric/Behavioral: Negative.         Objective   /78 (BP Location: Left arm, Patient Position: Sitting, Cuff Size: Large)   Pulse 93   Temp 97.7 °F (36.5 °C) (Tympanic)   Resp 16   Ht 5' 7\" (1.702 m)   Wt (!) 139 kg (305 lb 6.4 oz)   LMP 05/15/2019 (Approximate)   SpO2 98%   BMI 47.83 kg/m²      Physical Exam  Constitutional:       General: She is not in acute distress.     Appearance: Normal appearance. She is well-developed. She is not diaphoretic.   HENT:      Head: Normocephalic and atraumatic.      Right Ear: Tympanic membrane, ear canal and external ear normal.      Left Ear: Tympanic membrane, ear canal and external ear normal.      Nose: Nose normal.      Mouth/Throat:      Mouth: Mucous membranes are moist.      Pharynx: Oropharynx is clear. No oropharyngeal exudate.     Eyes:      General: No scleral icterus.        Right eye: No discharge.         Left eye: No discharge.      Extraocular Movements: Extraocular movements intact.      Pupils: Pupils are equal, round, and reactive to light.     Neck:      Thyroid: No thyromegaly.      Vascular: No JVD.      Trachea: No tracheal deviation.     Cardiovascular:      Rate and Rhythm: Normal rate and regular rhythm.      Heart sounds: Normal heart sounds. No murmur heard.     No friction rub. No gallop.   Pulmonary:      Effort: Pulmonary effort is normal. No respiratory distress.      Breath sounds: Normal breath sounds. No wheezing or rales.   Chest:      Chest wall: No tenderness.   Abdominal:      General: Bowel sounds are normal. There is no distension.      " Palpations: Abdomen is soft. There is no mass.      Tenderness: There is no abdominal tenderness. There is no guarding or rebound.      Hernia: No hernia is present.     Musculoskeletal:         General: No tenderness or deformity. Normal range of motion.      Cervical back: Normal range of motion and neck supple.   Lymphadenopathy:      Cervical: No cervical adenopathy.     Skin:     General: Skin is warm and dry.      Coloration: Skin is not pale.      Findings: No erythema or rash.     Neurological:      Mental Status: She is alert and oriented to person, place, and time.      Cranial Nerves: No cranial nerve deficit.      Sensory: No sensory deficit.      Motor: No abnormal muscle tone.      Coordination: Coordination normal.      Deep Tendon Reflexes: Reflexes normal.     Psychiatric:         Mood and Affect: Mood normal.         Behavior: Behavior normal.         Thought Content: Thought content normal.         Judgment: Judgment normal.       Administrative Statements   I have spent a total time of 20 minutes in caring for this patient on the day of the visit/encounter including Prognosis, Risks and benefits of tx options, Instructions for management, Patient and family education, Importance of tx compliance, Risk factor reductions, Impressions, Counseling / Coordination of care, Documenting in the medical record, Reviewing/placing orders in the medical record (including tests, medications, and/or procedures), and Obtaining or reviewing history  .

## 2025-06-19 ENCOUNTER — TELEPHONE (OUTPATIENT)
Dept: BARIATRICS | Facility: CLINIC | Age: 45
End: 2025-06-19

## 2025-06-19 NOTE — TELEPHONE ENCOUNTER
Lost call from the pods... called and left message to call back to schedule a new patient appt with surgical

## 2025-06-24 ENCOUNTER — OFFICE VISIT (OUTPATIENT)
Dept: SLEEP CENTER | Facility: CLINIC | Age: 45
End: 2025-06-24
Payer: COMMERCIAL

## 2025-06-24 ENCOUNTER — TELEPHONE (OUTPATIENT)
Dept: SLEEP CENTER | Facility: CLINIC | Age: 45
End: 2025-06-24

## 2025-06-24 VITALS
DIASTOLIC BLOOD PRESSURE: 76 MMHG | HEIGHT: 67 IN | WEIGHT: 293 LBS | OXYGEN SATURATION: 98 % | HEART RATE: 67 BPM | SYSTOLIC BLOOD PRESSURE: 124 MMHG | BODY MASS INDEX: 45.99 KG/M2

## 2025-06-24 DIAGNOSIS — J45.40 MODERATE PERSISTENT ASTHMA WITHOUT COMPLICATION: ICD-10-CM

## 2025-06-24 DIAGNOSIS — G47.19 EXCESSIVE DAYTIME SLEEPINESS: ICD-10-CM

## 2025-06-24 DIAGNOSIS — I10 PRIMARY HYPERTENSION: ICD-10-CM

## 2025-06-24 DIAGNOSIS — R73.03 PREDIABETES: ICD-10-CM

## 2025-06-24 DIAGNOSIS — E66.813 CLASS 3 SEVERE OBESITY DUE TO EXCESS CALORIES WITHOUT SERIOUS COMORBIDITY WITH BODY MASS INDEX (BMI) OF 45.0 TO 49.9 IN ADULT: ICD-10-CM

## 2025-06-24 DIAGNOSIS — G47.33 OBSTRUCTIVE SLEEP APNEA TREATED WITH CONTINUOUS POSITIVE AIRWAY PRESSURE (CPAP): Primary | ICD-10-CM

## 2025-06-24 LAB

## 2025-06-24 PROCEDURE — 99214 OFFICE O/P EST MOD 30 MIN: CPT | Performed by: PHYSICIAN ASSISTANT

## 2025-06-24 RX ORDER — AMOXICILLIN 500 MG/1
500 CAPSULE ORAL
COMMUNITY
Start: 2025-06-17

## 2025-06-24 NOTE — ASSESSMENT & PLAN NOTE
Patient has a history of moderate obstructive sleep apnea.  She has not been using her CPAP recently.  She would like to resume treatment due to excessive daytime sleepiness and to improve her overall health.  Order placed for new supplies today.  Patient seeing [facility] today to get new supplies.  If the insurance company requires a new sleep study in order to pay for supplies, patient will let me know, and we will order a new home study.  Orders:    PAP DME Resupply/Reorder

## 2025-06-24 NOTE — PROGRESS NOTES
Name: Alejandrina Aranda      : 1980      MRN: [id number]  Encounter Provider: Selin Patel PA-C  Encounter Date: 2025   Encounter department: [facility]  :  Assessment & Plan  Obstructive sleep apnea treated with continuous positive airway pressure (CPAP)  Patient has a history of moderate obstructive sleep apnea.  She has not been using her CPAP recently.  She would like to resume treatment due to excessive daytime sleepiness and to improve her overall health.  Order placed for new supplies today.  Patient seeing [facility] today to get new supplies.  If the insurance company requires a new sleep study in order to pay for supplies, patient will let me know, and we will order a new home study.  Orders:    PAP DME Resupply/Reorder    Excessive daytime sleepiness  Will improve or resolve with consistent use of CPAP to treat her moderate obstructive sleep apnea.       Class 3 severe obesity due to excess calories without serious comorbidity with body mass index (BMI) of 45.0 to 49.9 in adult  Patient has appointment scheduled with weight management to discuss surgical and nonsurgical options for weight loss       Prediabetes         Primary hypertension         Moderate persistent asthma without complication             History of Present Illness   HPI Alejandrina Aranda is a 44-year-old female who presents today in follow-up of moderate obstructive sleep apnea.  She completed a home sleep study in 2022 with an DOMINGUEZ of 15.2 and a weight of 315 pounds.  She successfully used CPAP for a period of time.  She then had a severe upper respiratory infection and stopped using her CPAP.  She then became frustrated as the insurance company would not cover supplies, and she discontinued use altogether.  She would like to resume treatment due to excessive daytime sleepiness and to improve her overall health.    Home sleep study  Moderate obstructive sleep apnea with an DOMINGUEZ of  "15.2    CPAP  Set up 7/5/2022  Pressure set at 5 to 20 cm of H2O  Mask: Fullface  DME provider: [facility]    Compliance  No recent compliance data    Sleep/work schedule  Patient works as a physics and  at [employer]  Sleeps in an armchair as she finds this more comfortable within her bed  Bedtime: 8:30 to 9 PM during the school year, 9 to 10 PM in the summer  Denies frequent nighttime awakenings  Wake up time: 4:30 to 5 AM during the school year, 6 to 7 AM during summer  Denies napping    Sitting and reading: (Patient-Rptd) (P) Moderate chance of dozing  Watching TV: (Patient-Rptd) (P) Moderate chance of dozing  Sitting, inactive in a public place (e.g. a theatre or a meeting): (Patient-Rptd) (P) Slight chance of dozing  As a passenger in a car for an hour without a break: (Patient-Rptd) (P) High chance of dozing  Lying down to rest in the afternoon when circumstances permit: (Patient-Rptd) (P) High chance of dozing  Sitting and talking to someone: (Patient-Rptd) (P) Slight chance of dozing  Sitting quietly after a lunch without alcohol: (Patient-Rptd) (P) Moderate chance of dozing  In a car, while stopped for a few minutes in traffic: (Patient-Rptd) (P) Slight chance of dozing  Total score: (Patient-Rptd) (P) 15     Review of Systems   Constitutional:  Positive for fatigue.   Respiratory: Negative.     Cardiovascular: Negative.    Allergic/Immunologic: Positive for environmental allergies.   Psychiatric/Behavioral:  Positive for agitation, decreased concentration and sleep disturbance.      Pertinent positives/negatives included in HPI and also as noted:       Objective   Ht 5' 7\" (1.702 m)   Wt (!) 139 kg (307 lb)   LMP 05/15/2019 (Approximate)   BMI 48.08 kg/m²        Physical Exam  Visit Vitals  Ht 5' 7\" (1.702 m)   Wt (!) 139 kg (307 lb)   LMP 05/15/2019 (Approximate)   BMI 48.08 kg/m²   OB Status Hysterectomy   Smoking Status Never   BSA 2.42 m²     Appearance : no distress, " "alert, and cooperative  MENTAL STATUS : alert and oriented, normal affect, makes good eye contact, provides a detailed history        Data  Lab Results   Component Value Date    HGB 14.0 05/09/2025    HCT 42.6 05/09/2025    MCV 86 05/09/2025      Lab Results   Component Value Date    CALCIUM 9.2 09/27/2024    K 3.7 05/09/2025    CO2 18 (L) 05/09/2025     05/09/2025    BUN 11 05/09/2025    CREATININE 0.68 05/09/2025     No results found for: \"IRON\", \"TIBC\", \"FERRITIN\"  Lab Results   Component Value Date    AST 12 05/09/2025    ALT 11 05/09/2025         "

## 2025-06-24 NOTE — PATIENT INSTRUCTIONS
[facility] will help you out with supplies today.  I also placed an order for new supplies.  Once your insurance sees that you are using your CPAP, your insurance should cover new supplies.  If not, I will order you a new sleep study to reconfirm your diagnosis.  Please let me know if you have any difficulty in obtaining your supplies.  Follow-up in 3 months.

## 2025-06-24 NOTE — ASSESSMENT & PLAN NOTE
A (Bal Emerge Rx 2.5x15) balloon was inflated in the left anterior descending artery and was removed in tact.     The balloon was inflated at 6 beto for 5 seconds at 1/12/2022 10:22 AM.  The balloon was used for 2nd inflation at 6 beto for 10 seconds.   Will improve or resolve with consistent use of CPAP to treat her moderate obstructive sleep apnea.

## 2025-06-24 NOTE — ASSESSMENT & PLAN NOTE
Patient has appointment scheduled with weight management to discuss surgical and nonsurgical options for weight loss

## 2025-06-25 ENCOUNTER — HOSPITAL ENCOUNTER (OUTPATIENT)
Dept: MAMMOGRAPHY | Facility: CLINIC | Age: 45
Discharge: HOME | End: 2025-06-25
Payer: COMMERCIAL

## 2025-06-25 VITALS — BODY MASS INDEX: 45.99 KG/M2 | HEIGHT: 67 IN | WEIGHT: 293 LBS

## 2025-06-25 DIAGNOSIS — Z12.31 ENCOUNTER FOR SCREENING MAMMOGRAM FOR BREAST CANCER: ICD-10-CM

## 2025-06-25 PROCEDURE — 77063 BREAST TOMOSYNTHESIS BI: CPT

## 2025-06-25 PROCEDURE — 77067 SCR MAMMO BI INCL CAD: CPT

## 2025-07-03 ENCOUNTER — OFFICE VISIT (OUTPATIENT)
Dept: BARIATRICS | Facility: CLINIC | Age: 45
End: 2025-07-03

## 2025-07-03 VITALS
HEIGHT: 67 IN | SYSTOLIC BLOOD PRESSURE: 128 MMHG | TEMPERATURE: 97.5 F | RESPIRATION RATE: 16 BRPM | BODY MASS INDEX: 45.99 KG/M2 | WEIGHT: 293 LBS | HEART RATE: 96 BPM | DIASTOLIC BLOOD PRESSURE: 86 MMHG

## 2025-07-03 DIAGNOSIS — E66.813 CLASS 3 SEVERE OBESITY DUE TO EXCESS CALORIES WITHOUT SERIOUS COMORBIDITY WITH BODY MASS INDEX (BMI) OF 45.0 TO 49.9 IN ADULT: ICD-10-CM

## 2025-07-03 DIAGNOSIS — G47.33 OBSTRUCTIVE SLEEP APNEA TREATED WITH CONTINUOUS POSITIVE AIRWAY PRESSURE (CPAP): Primary | ICD-10-CM

## 2025-07-03 DIAGNOSIS — R73.03 PREDIABETES: ICD-10-CM

## 2025-07-03 RX ORDER — TIRZEPATIDE 2.5 MG/.5ML
2.5 INJECTION, SOLUTION SUBCUTANEOUS WEEKLY
Qty: 2 ML | Refills: 0 | Status: SHIPPED | OUTPATIENT
Start: 2025-07-03 | End: 2025-07-31

## 2025-07-03 NOTE — ASSESSMENT & PLAN NOTE
Discussed importance of lifestyle modification and weight loss to prevent development of Type 2 Diabetes.    Continue treatment with Metformin  Discussed option of OGTT as another way to diagnose Type 2 Diabetes, which may open up option for treatment with Mounjaro if Zepbound is not covered for SID.

## 2025-07-03 NOTE — ASSESSMENT & PLAN NOTE
"-Discussed options of HealthyCORE-Intensive Lifestyle Intervention Program, Very Low Calorie Diet-VLCD, Conservative Program, Shahida-En-Y Gastric Bypass, and Vertical Sleeve Gastrectomy and the role of weight loss medications  -Explained the importance of making lifestyle changes if utilizing medication to aid in weight loss  -Discussed that obesity is a chronic disease and medications are typically used long term as stopped medication will increase risk of weight gain.   -Initial weight loss goal of 5-10% weight loss for improved health  -Screening labs and records reviewed from prior  - Initial Weight: 304  - Goal Weight: 200    -Patient is interested in pursuing Multiple options for weight loss  -She is already scheduled for consultation for baratric surgery for more information, and would consider Bariatric surgery in 2025  -Considering Healthy [facility] Program-- but first plans to discuss cost with her .   -Advised her that she does not have to choose 1 option for weight management, as pharmacologic therapy can be used to help with weight loss leading up to surgery.  One benefit of bariatric surgery is that she would not need to rely on her insurance for long term coverage of medication which can be expensive.     Goals:  -Recommend Referral to Registered Dietitian-- She will wait until after seeing surgeon to schedule appointment as RD visits would be covered under surgical program if planning on bariatric surgery  -Will refer to  to discuss \"food addiction\"  -Recommend [id number] Calorie diet, meal plan provided. Suggest Calorie tracking Morgan such as [employer].   -To drink at least 64oz of water daily. No sugary beverages.  -Recommend working up to at least 150 minutes of exercise per week including full body strength training 2x per week    Discussed medication options  Would avoid phentermine due to anxiety/palpitations.   Recommend treatment with Zepbound-- it is possible that insurance " may cover Zepbound based on dx of Moderate SID.  She has discussed this with her sleep specalist and aware that updated sleep study may be necessary.   Discussed expected weight loss of approximately 20% along with lifestyle modifications  Discussed risks/side effects of medication and demonstrated pen device  Recommend small/low fat meals and stop eating when full to avoid side effects.  Discussed importance of adequate protein intake and strength training to reduce risk of muscle loss.   Discussed need to stop medication for at least 1 week prior to planned surgery/endoscopy  Denies hx Pancreatitis or FH of Medullary cell Thyroid CA/MEN2 syndrome  Start Zepbound  2.5mg weekly x 4 weeks  Advised to contact office in 2 weeks with update regarding tolerability and at that time will increase to 5mg dose if tolerating medication with no significant side effects.    For now, will continue topiramate and Metformin as she is tolerating without side effects and they have been helpful with some weight loss.

## 2025-07-03 NOTE — ASSESSMENT & PLAN NOTE
Continue to follow with Sleep Medicine   Will Rx Zepbound due to Obesity/Moderate SID with AHI of 15.2.

## 2025-07-03 NOTE — PROGRESS NOTES
"Assessment & Plan  Class 3 severe obesity due to excess calories without serious comorbidity with body mass index (BMI) of 45.0 to 49.9 in adult  -Discussed options of HealthyCORE-Intensive Lifestyle Intervention Program, Very Low Calorie Diet-VLCD, Conservative Program, Shahida-En-Y Gastric Bypass, and Vertical Sleeve Gastrectomy and the role of weight loss medications  -Explained the importance of making lifestyle changes if utilizing medication to aid in weight loss  -Discussed that obesity is a chronic disease and medications are typically used long term as stopped medication will increase risk of weight gain.   -Initial weight loss goal of 5-10% weight loss for improved health  -Screening labs and records reviewed from prior  - Initial Weight: 304  - Goal Weight: 200    -Patient is interested in pursuing Multiple options for weight loss  -She is already scheduled for consultation for baratric surgery for more information, and would consider Bariatric surgery in 2025  -Considering Healthy Core Program-- but first plans to discuss cost with her .   -Advised her that she does not have to choose 1 option for weight management, as pharmacologic therapy can be used to help with weight loss leading up to surgery.  One benefit of bariatric surgery is that she would not need to rely on her insurance for long term coverage of medication which can be expensive.     Goals:  -Recommend Referral to Registered Dietitian-- She will wait until after seeing surgeon to schedule appointment as RD visits would be covered under surgical program if planning on bariatric surgery  -Will refer to  to discuss \"food addiction\"  -Recommend [id number] Calorie diet, meal plan provided. Suggest Calorie tracking Morgan such as Nutrition X Track.   -To drink at least 64oz of water daily. No sugary beverages.  -Recommend working up to at least 150 minutes of exercise per week including full body strength training 2x per " week    Discussed medication options  Would avoid phentermine due to anxiety/palpitations.   Recommend treatment with Zepbound-- it is possible that insurance may cover Zepbound based on dx of Moderate SID.  She has discussed this with her sleep specalist and aware that updated sleep study may be necessary.   Discussed expected weight loss of approximately 20% along with lifestyle modifications  Discussed risks/side effects of medication and demonstrated pen device  Recommend small/low fat meals and stop eating when full to avoid side effects.  Discussed importance of adequate protein intake and strength training to reduce risk of muscle loss.   Discussed need to stop medication for at least 1 week prior to planned surgery/endoscopy  Denies hx Pancreatitis or FH of Medullary cell Thyroid CA/MEN2 syndrome  Start Zepbound  2.5mg weekly x 4 weeks  Advised to contact office in 2 weeks with update regarding tolerability and at that time will increase to 5mg dose if tolerating medication with no significant side effects.    For now, will continue topiramate and Metformin as she is tolerating without side effects and they have been helpful with some weight loss.          Obstructive sleep apnea treated with continuous positive airway pressure (CPAP)  Continue to follow with Sleep Medicine   Will Rx Zepbound due to Obesity/Moderate SID with AHI of 15.2.   Prediabetes  Discussed importance of lifestyle modification and weight loss to prevent development of Type 2 Diabetes.    Continue treatment with Metformin  Discussed option of OGTT as another way to diagnose Type 2 Diabetes, which may open up option for treatment with Mounjaro if Zepbound is not covered for SID.        Return in about 3 months (around 10/3/2025) for  visit. .       Subjective:   Chief Complaint   Patient presents with   • Consult     Mwm Consult: waist: 56in. sb: has sleep apnea/cpap       Patient ID: Alejandrina Aranda  is a 45 y.o. female with  "excess weight/obesity here to pursue weight management.    HPI: Here for MWM consult    Obesity/Excess Weight:  Current BMI: Body mass index is 47.64 kg/m².   Onset:  Many years ago    Previous Weight Loss Attempts: Commercial Weight Loss Programs-ie. Weight Watchers, Brandee Sincere, Nutrisystem, etc. and Prescription Weight Loss Medications  Contributing factors: Feels that she has \"food addiction\"  Weight Related Comorbidities: Moderate SID, Prediabetes    PCP tries to get wegovy covered- but not covered  Also considering bariatric Surgery-- has appt scheduled with surgeon  Has tried Metformin/Topiramate-- Initially lost 15-20 pounds but no longer losing weight     Current Weight: 304  Lowest Weight: 280  Highest Weight: 245  Goal Weight: 200 lbs     Food Recall:  Breakfast: In the summer, 2 eggs/toast and bananan or Cheerios/banana  Does not measure foods  During the school year, Will have something on the go  Lunch: Varies-- fast food, sandwich, leftovers  PM Snack: After school, will snack 3-4 PM  Dinner: Varies-- [place]  Powderly in air fryer, lots of different beef.   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    Denies any hx of glaucoma, seizures, kidney stones  +Lou  Denies Hx of CAD, PAD, palpitations, arrhythmia.   Denies uncontrolled anxiety or depression, suicidal thoughts, insomnia, or sleep disturbance.   +Anxiety (controlled)    Has had hysterectomy, still has ovaries, now having hot flashes  Will be seeing GYN in August  Has had hair los    Wt Readings " "from Last 20 Encounters:   07/03/25 (!) 138 kg (304 lb 3.2 oz)   06/25/25 (!) 139 kg (307 lb)   06/24/25 (!) 139 kg (307 lb)   06/16/25 (!) 139 kg (305 lb 6.4 oz)   06/09/25 (!) 139 kg (307 lb)   04/18/25 (!) 139 kg (307 lb)   02/12/25 (!) 140 kg (308 lb)   01/23/25 (!) 140 kg (309 lb)   12/02/24 (!) 140 kg (309 lb)   10/17/24 (!) 140 kg (309 lb)   10/10/24 (!) 140 kg (307 lb 9.6 oz)   09/27/24 (!) 140 kg (308 lb)   09/26/24 (!) 138 kg (305 lb)   08/16/24 (!) 143 kg (314 lb 6.4 oz)   07/29/24 (!) 142 kg (312 lb)   06/19/24 (!) 146 kg (321 lb)   06/12/24 (!) 146 kg (321 lb)   01/15/24 (!) 144 kg (317 lb)   12/18/23 (!) 145 kg (320 lb)   12/11/23 (!) 145 kg (320 lb 3.2 oz)        Past Medical History[1]    Past Surgical History[2]     Allergies[3]     Review of Systems   Constitutional:  Positive for fatigue.       Objective:    /86   Pulse 96   Temp 97.5 °F (36.4 °C)   Resp 16   Ht 5' 7\" (1.702 m)   Wt (!) 138 kg (304 lb 3.2 oz)   LMP 05/15/2019 (Approximate)   BMI 47.64 kg/m²     Physical Exam:  Constitutional:  she  appears well-developed and well-nourished. No distress.   HENT:   Head: Normocephalic and atraumatic.   Neck: Normal range of motion.   Pulmonary/Chest: Effort normal.   Musculoskeletal: Normal range of motion.   Neurological: she  is alert and oriented to person, place, and time.   Skin: she  is not diaphoretic.   Psychiatric: she  has a normal mood and affect. her  behavior is normal.      Labs:    Lab Results   Component Value Date    HGBA1C 6.2 (H) 05/09/2025 05/09/2025   Lab Results   Component Value Date    SODIUM 140 05/09/2025    K 3.7 05/09/2025     05/09/2025    CO2 18 (L) 05/09/2025    AGAP 8 09/27/2024    BUN 11 05/09/2025    CREATININE 0.68 05/09/2025    GLUC 114 (H) 05/09/2025    CALCIUM 9.2 09/27/2024    AST 12 05/09/2025    ALT 11 05/09/2025    ALKPHOS 65 09/27/2024    TP 6.6 05/09/2025    TBILI 0.3 05/09/2025    EGFR 109 05/09/2025      Lab Results   Component " Value Date    TSH 1.380 05/09/2025             [1]  Past Medical History:  Diagnosis Date   • Allergic rhinitis    • Anxiety    • Asthma    • Depression    • Family health problem     mother and grandmother h/o blood clots after surgery   • Fibroid    • Fullness in ear, left     Resolved [id number]   • Hypertension     Resolved [id number]   • Sleep apnea    • Urinary tract infection     h/o   [2]  Past Surgical History:  Procedure Laterality Date   • CHOLECYSTECTOMY     • HYSTERECTOMY     • [place] TOTAL ABDOMINAL HYSTERECT W/WO RMVL TUBE OVARY N/A 6/18/2019    Procedure: Supracervical IWONA, removal of bilat tubes, removal of IUD, cystoscopy;  Surgeon: Geovanni Segura MD;  Location: [facility];  Service: Gynecology   • TOOTH EXTRACTION     [3]  Allergies  Allergen Reactions   • Pollen Extract Allergic Rhinitis

## 2025-07-08 ENCOUNTER — TELEPHONE (OUTPATIENT)
Dept: BARIATRICS | Facility: CLINIC | Age: 45
End: 2025-07-08

## 2025-07-08 NOTE — TELEPHONE ENCOUNTER
LVM for pt in regards to confirming appts on  7/17/25 at 1:30 pm and 3:15 pm with Rolanda and Dr. Travis Warner. Patient still unconfirmed.

## 2025-07-11 ENCOUNTER — TELEPHONE (OUTPATIENT)
Dept: BARIATRICS | Facility: CLINIC | Age: 45
End: 2025-07-11

## 2025-07-11 NOTE — TELEPHONE ENCOUNTER
PA for Zepbound SUBMITTED to  [facility]     via    [x]CMM-KEY: [id number]   []Surescripts-Case ID #   []Availity-Auth ID # NDC #   []Faxed to plan   []Other website   []Phone call Case ID #     [x]PA sent as URGENT    All office notes, labs and other pertaining documents and studies sent. Clinical questions answered. Awaiting determination from insurance company.     Turnaround time for your insurance to make a decision on your Prior Authorization can take 7-21 business days.

## 2025-07-11 NOTE — TELEPHONE ENCOUNTER
PA for Zepbound  APPROVED     Date(s) approved 7/11/2025 - 7/11/2026    Case #    Patient advised by          []MyChart Message  [x]Phone call   []LMOM  []L/M to call office as no active Communication consent on file  []Unable to leave detailed message as VM not approved on Communication consent       Pharmacy advised by    []Fax  [x]Phone call  []Secure Chat    Specialty Pharmacy    []     Approval letter scanned into [employer]
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
## 2025-07-17 ENCOUNTER — CLINICAL SUPPORT (OUTPATIENT)
Dept: BARIATRICS | Facility: CLINIC | Age: 45
End: 2025-07-17

## 2025-07-17 ENCOUNTER — OFFICE VISIT (OUTPATIENT)
Dept: BARIATRICS | Facility: CLINIC | Age: 45
End: 2025-07-17
Payer: COMMERCIAL

## 2025-07-17 VITALS
HEIGHT: 67 IN | DIASTOLIC BLOOD PRESSURE: 76 MMHG | TEMPERATURE: 97.5 F | HEART RATE: 76 BPM | SYSTOLIC BLOOD PRESSURE: 124 MMHG | WEIGHT: 293 LBS | BODY MASS INDEX: 45.99 KG/M2

## 2025-07-17 VITALS — WEIGHT: 293 LBS | BODY MASS INDEX: 48.3 KG/M2

## 2025-07-17 DIAGNOSIS — R63.5 ABNORMAL WEIGHT GAIN: Primary | ICD-10-CM

## 2025-07-17 DIAGNOSIS — E66.01 MORBID (SEVERE) OBESITY DUE TO EXCESS CALORIES (HCC): Primary | ICD-10-CM

## 2025-07-17 PROCEDURE — 99203 OFFICE O/P NEW LOW 30 MIN: CPT | Performed by: SURGERY

## 2025-07-17 PROCEDURE — RECHECK

## 2025-07-17 NOTE — PROGRESS NOTES
"    BARIATRIC CONSULT-INITIAL - BARIATRIC SURGERY  Alejandrina Aranda 45 y.o. female MRN: [id number]  Unit/Bed#:  Encounter: [id number]      HPI:  Alejandrina Aranda is a 45 y.o. female who presents with morbid obesity to discuss weight loss options.    Review of Systems    Historical Information   Past Medical History[1]  Past Surgical History[2]  Social History   Social History     Substance and Sexual Activity   Alcohol Use Yes    Alcohol/week: 1.0 standard drink of alcohol    Types: 1 Standard drinks or equivalent per week    Comment: socially once a month     Social History     Substance and Sexual Activity   Drug Use No     Tobacco Use History[3]  Family History: Family history non-contributory    Meds/Allergies   all medications and allergies reviewed  Allergies[4]    Objective       Current Vitals:   Blood Pressure: 124/76 (07/17/25 1529)  Pulse: 76 (07/17/25 1529)  Temperature: 97.5 °F (36.4 °C) (07/17/25 1529)  Temp Source: Tympanic (07/17/25 1529)  Height: 5' 7\" (170.2 cm) (07/17/25 1529)  Weight - Scale: (!) 139 kg (306 lb) (07/17/25 1529)  Body mass index is 47.93 kg/m².      Invasive Devices       None                   Physical Exam    Lab Results: I have personally reviewed pertinent lab results.    Imaging: Results Review Statement: No pertinent imaging studies reviewed.  EKG, Pathology, and Other Studies: Results Review Statement: No pertinent imaging studies reviewed.    Code Status: [unfilled]  Advance Directive and Living Will:      Power of :    POLST:      Assessment/PLAN:            Patient has a long history of morbid obesity and is presenting to discuss the surgical weight loss options. Patient failed previous attempts to achieve sustainable long term weight loss and also failed conservative management. We had a long discussion regarding all the surgical weight-loss options at our disposal at this point and reviewed the risks and benefits of each procedure in details as it relates to her " age, BMI and medical conditions.    Patient elected to continue with the medical weight loss program but she is open to a RYGB if she doesn't respond     I spent 30 min with the patient more than 50% of the time was spent face to face educating the patient and coordinating care.    - Height/Weight:  67 in , 306 lb  - BMI:  48.3  - Medical conditions related to obesity: Hypertension on meds   - Does the patient smoke/vape (nicotine, marijuana, hookah, etc): no  - StopBANG:  Sleep apnea  W ( CPAP )  - Does the patient currently take a weight loss medication: yes Zepbound 2.5 mg    - For *NEW/TRANSFER* patients only: Who referred the patient? Please provide name and specialty (PCP, GI, etc.).    MWM        [1]   Past Medical History:  Diagnosis Date    Allergic rhinitis     Anxiety     Asthma     Depression     Family health problem     mother and grandmother h/o blood clots after surgery    Fibroid     Fullness in ear, left     Resolved [id number]    Hypertension     Resolved [id number]    Sleep apnea     Urinary tract infection     h/o   [2]   Past Surgical History:  Procedure Laterality Date    CHOLECYSTECTOMY      HYSTERECTOMY      SC TOTAL ABDOMINAL HYSTERECT W/WO RMVL TUBE OVARY N/A 6/18/2019    Procedure: Supracervical IWONA, removal of bilat tubes, removal of IUD, cystoscopy;  Surgeon: Geovanni Segura MD;  Location: [facility] OR;  Service: Gynecology    TOOTH EXTRACTION     [3]   Social History  Tobacco Use   Smoking Status Never    Passive exposure: Past   Smokeless Tobacco Never   [4]   Allergies  Allergen Reactions    Pollen Extract Allergic Rhinitis

## 2025-07-25 ENCOUNTER — TELEPHONE (OUTPATIENT)
Dept: BARIATRICS | Facility: CLINIC | Age: 45
End: 2025-07-25

## 2025-07-30 ENCOUNTER — TELEPHONE (OUTPATIENT)
Dept: BARIATRICS | Facility: CLINIC | Age: 45
End: 2025-07-30

## 2025-07-31 DIAGNOSIS — G47.33 OBSTRUCTIVE SLEEP APNEA TREATED WITH CONTINUOUS POSITIVE AIRWAY PRESSURE (CPAP): ICD-10-CM

## 2025-07-31 DIAGNOSIS — E66.813 CLASS 3 SEVERE OBESITY DUE TO EXCESS CALORIES WITHOUT SERIOUS COMORBIDITY WITH BODY MASS INDEX (BMI) OF 45.0 TO 49.9 IN ADULT: ICD-10-CM

## 2025-08-01 DIAGNOSIS — G47.33 OBSTRUCTIVE SLEEP APNEA TREATED WITH CONTINUOUS POSITIVE AIRWAY PRESSURE (CPAP): ICD-10-CM

## 2025-08-01 DIAGNOSIS — E66.813 CLASS 3 SEVERE OBESITY DUE TO EXCESS CALORIES WITHOUT SERIOUS COMORBIDITY WITH BODY MASS INDEX (BMI) OF 45.0 TO 49.9 IN ADULT: Primary | ICD-10-CM

## 2025-08-01 RX ORDER — TIRZEPATIDE 5 MG/.5ML
5 INJECTION, SOLUTION SUBCUTANEOUS WEEKLY
Qty: 2 ML | Refills: 1 | Status: SHIPPED | OUTPATIENT
Start: 2025-08-01 | End: 2025-09-26

## 2025-08-01 RX ORDER — TIRZEPATIDE 2.5 MG/.5ML
2.5 INJECTION, SOLUTION SUBCUTANEOUS WEEKLY
Qty: 2 ML | Refills: 0 | OUTPATIENT
Start: 2025-08-01 | End: 2025-08-29

## 2025-08-04 ENCOUNTER — CLINICAL SUPPORT (OUTPATIENT)
Dept: BARIATRICS | Facility: CLINIC | Age: 45
End: 2025-08-04

## 2025-08-04 ENCOUNTER — TELEPHONE (OUTPATIENT)
Dept: BARIATRICS | Facility: CLINIC | Age: 45
End: 2025-08-04

## 2025-08-04 DIAGNOSIS — E66.01 MORBID (SEVERE) OBESITY DUE TO EXCESS CALORIES (HCC): Primary | ICD-10-CM

## 2025-08-04 PROCEDURE — RECHECK

## 2025-08-22 ENCOUNTER — ANNUAL EXAM (OUTPATIENT)
Dept: GYNECOLOGY | Facility: CLINIC | Age: 45
End: 2025-08-22
Payer: COMMERCIAL

## 2025-08-22 VITALS
DIASTOLIC BLOOD PRESSURE: 74 MMHG | SYSTOLIC BLOOD PRESSURE: 112 MMHG | WEIGHT: 293 LBS | BODY MASS INDEX: 45.99 KG/M2 | HEIGHT: 67 IN

## 2025-08-22 DIAGNOSIS — N95.2 VAGINAL ATROPHY: ICD-10-CM

## 2025-08-22 DIAGNOSIS — Z90.711 S/P ABDOMINAL SUPRACERVICAL SUBTOTAL HYSTERECTOMY: ICD-10-CM

## 2025-08-22 DIAGNOSIS — B37.31 VAGINA, CANDIDIASIS: ICD-10-CM

## 2025-08-22 DIAGNOSIS — Z01.419 WOMEN'S ANNUAL ROUTINE GYNECOLOGICAL EXAMINATION: Primary | ICD-10-CM

## 2025-08-22 DIAGNOSIS — Z12.31 ENCOUNTER FOR SCREENING MAMMOGRAM FOR BREAST CANCER: ICD-10-CM

## 2025-08-22 DIAGNOSIS — N95.1 MENOPAUSAL SYMPTOMS: ICD-10-CM

## 2025-08-22 LAB
BV WHIFF TEST VAG QL: NEGATIVE
CLUE CELLS SPEC QL WET PREP: NEGATIVE
PH SMN: 4 [PH]
SL AMB POCT WET MOUNT: YES
T VAGINALIS VAG QL WET PREP: NEGATIVE
YEAST VAG QL WET PREP: ABNORMAL

## 2025-08-22 PROCEDURE — S0612 ANNUAL GYNECOLOGICAL EXAMINA: HCPCS | Performed by: OBSTETRICS & GYNECOLOGY

## 2025-08-22 PROCEDURE — 87210 SMEAR WET MOUNT SALINE/INK: CPT | Performed by: OBSTETRICS & GYNECOLOGY

## 2025-08-22 RX ORDER — ESTRADIOL 0.03 MG/D
1 PATCH TRANSDERMAL WEEKLY
Qty: 4 PATCH | Refills: 2 | Status: SHIPPED | OUTPATIENT
Start: 2025-08-22

## 2025-08-22 RX ORDER — FLUCONAZOLE 150 MG/1
150 TABLET ORAL ONCE
Qty: 1 TABLET | Refills: 0 | Status: SHIPPED | OUTPATIENT
Start: 2025-08-22 | End: 2025-08-22

## (undated) DEVICE — ELECTRODE BLADE E-Z CLEAN 6.5IN -0014

## (undated) DEVICE — SUT VICRYL 1 CT-1 27 IN J261H

## (undated) DEVICE — STANDARD SURGICAL GOWN, L: Brand: CONVERTORS

## (undated) DEVICE — SPONGE LAP 18 X 18 IN STRL RFD

## (undated) DEVICE — 2000CC GUARDIAN II: Brand: GUARDIAN

## (undated) DEVICE — SUT VICRYL 3-0 REEL 54 IN J285G

## (undated) DEVICE — PENCIL ELECTROSURG E-Z CLEAN -0035H

## (undated) DEVICE — INTENDED FOR TISSUE SEPARATION, AND OTHER PROCEDURES THAT REQUIRE A SHARP SURGICAL BLADE TO PUNCTURE OR CUT.: Brand: BARD-PARKER SAFETY BLADES SIZE 15, STERILE

## (undated) DEVICE — SUT VICRYL 0 CT-1 36 IN J946H

## (undated) DEVICE — PREMIUM DRY TRAY LF: Brand: MEDLINE INDUSTRIES, INC.

## (undated) DEVICE — TRAY FOLEY 16FR URIMETER SILICONE SURESTEP

## (undated) DEVICE — ADHESIVE SKN CLSR HISTOACRYL FLEX 0.5ML LF

## (undated) DEVICE — DRAPE FLUID WARMER (BIRD BATH)

## (undated) DEVICE — IV EXTENSION TUBING 33 IN

## (undated) DEVICE — PROXIMATE SKIN STAPLERS (35 WIDE) CONTAINS 35 STAINLESS STEEL STAPLES (FIXED HEAD): Brand: PROXIMATE

## (undated) DEVICE — INTENDED FOR TISSUE SEPARATION, AND OTHER PROCEDURES THAT REQUIRE A SHARP SURGICAL BLADE TO PUNCTURE OR CUT.: Brand: BARD-PARKER SAFETY BLADES SIZE 10, STERILE

## (undated) DEVICE — GLOVE INDICATOR PI UNDERGLOVE SZ 6.5 BLUE

## (undated) DEVICE — GLOVE PI ULTRA TOUCH SZ.6.5

## (undated) DEVICE — GLOVE INDICATOR PI UNDERGLOVE SZ 8 BLUE

## (undated) DEVICE — CHLORAPREP HI-LITE 26ML ORANGE

## (undated) DEVICE — BOWL: 16OZ PEELPOUCH 75/CS 16/PLT: Brand: MEDEGEN MEDICAL PRODUCTS, LLC

## (undated) DEVICE — SYRINGE 50ML LL

## (undated) DEVICE — SCD SEQUENTIAL COMPRESSION COMFORT SLEEVE MEDIUM KNEE LENGTH: Brand: KENDALL SCD

## (undated) DEVICE — TIBURON TRANSVERSE LAPAROTOMY SHEET: Brand: CONVERTORS

## (undated) DEVICE — GLOVE PI ULTRA TOUCH SZ.7.5

## (undated) DEVICE — BETHLEHEM UNIVERSAL LAPAROTOMY: Brand: CARDINAL HEALTH

## (undated) DEVICE — WOUND RETRACTOR AND PROTECTOR: Brand: ALEXIS O WOUND PROTECTOR-RETRACTOR

## (undated) DEVICE — GLOVE INDICATOR PI UNDERGLOVE SZ 7.5 BLUE

## (undated) DEVICE — GLOVE PI ULTRA TOUCH SZ.8.0

## (undated) DEVICE — SUT VICRYL 0 CT-1 CR/8 27 IN JJ41G

## (undated) DEVICE — DRAPE EQUIPMENT RF WAND

## (undated) DEVICE — GLOVE INDICATOR PI UNDERGLOVE SZ 8.5 BLUE